# Patient Record
Sex: FEMALE | Race: WHITE | HISPANIC OR LATINO | URBAN - METROPOLITAN AREA
[De-identification: names, ages, dates, MRNs, and addresses within clinical notes are randomized per-mention and may not be internally consistent; named-entity substitution may affect disease eponyms.]

---

## 2017-07-21 ENCOUNTER — EMERGENCY (EMERGENCY)
Facility: HOSPITAL | Age: 72
LOS: 0 days | Discharge: HOME | End: 2017-07-21

## 2017-07-21 DIAGNOSIS — R06.02 SHORTNESS OF BREATH: ICD-10-CM

## 2017-07-21 DIAGNOSIS — J45.909 UNSPECIFIED ASTHMA, UNCOMPLICATED: ICD-10-CM

## 2017-07-21 DIAGNOSIS — I10 ESSENTIAL (PRIMARY) HYPERTENSION: ICD-10-CM

## 2017-07-21 DIAGNOSIS — Z87.442 PERSONAL HISTORY OF URINARY CALCULI: ICD-10-CM

## 2017-07-21 DIAGNOSIS — R42 DIZZINESS AND GIDDINESS: ICD-10-CM

## 2017-07-21 DIAGNOSIS — T54.91XA TOXIC EFFECT OF UNSPECIFIED CORROSIVE SUBSTANCE, ACCIDENTAL (UNINTENTIONAL), INITIAL ENCOUNTER: ICD-10-CM

## 2017-07-29 ENCOUNTER — EMERGENCY (EMERGENCY)
Facility: HOSPITAL | Age: 72
LOS: 0 days | Discharge: HOME | End: 2017-07-29

## 2017-07-29 DIAGNOSIS — I10 ESSENTIAL (PRIMARY) HYPERTENSION: ICD-10-CM
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
## 2017-07-29 DIAGNOSIS — J45.909 UNSPECIFIED ASTHMA, UNCOMPLICATED: ICD-10-CM
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
## 2017-07-29 DIAGNOSIS — R53.1 WEAKNESS: ICD-10-CM

## 2017-07-29 DIAGNOSIS — R35.8 OTHER POLYURIA: ICD-10-CM

## 2017-07-29 DIAGNOSIS — F41.0 PANIC DISORDER [EPISODIC PAROXYSMAL ANXIETY]: ICD-10-CM

## 2017-07-29 DIAGNOSIS — R63.1 POLYDIPSIA: ICD-10-CM

## 2017-07-29 DIAGNOSIS — R00.2 PALPITATIONS: ICD-10-CM

## 2017-07-29 DIAGNOSIS — S52.501A UNSPECIFIED FRACTURE OF THE LOWER END OF RIGHT RADIUS, INITIAL ENCOUNTER FOR CLOSED FRACTURE: ICD-10-CM
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
## 2017-07-29 DIAGNOSIS — R51 HEADACHE: ICD-10-CM

## 2017-07-29 DIAGNOSIS — M79.605 PAIN IN LEFT LEG: ICD-10-CM

## 2017-11-22 ENCOUNTER — OUTPATIENT (OUTPATIENT)
Dept: OUTPATIENT SERVICES | Facility: HOSPITAL | Age: 72
LOS: 1 days | Discharge: HOME | End: 2017-11-22

## 2017-11-22 DIAGNOSIS — F41.1 GENERALIZED ANXIETY DISORDER: ICD-10-CM

## 2017-12-19 ENCOUNTER — INPATIENT (INPATIENT)
Facility: HOSPITAL | Age: 72
LOS: 1 days | Discharge: HOME | End: 2017-12-21

## 2017-12-19 DIAGNOSIS — R07.9 CHEST PAIN, UNSPECIFIED: ICD-10-CM

## 2017-12-27 DIAGNOSIS — I16.0 HYPERTENSIVE URGENCY: ICD-10-CM

## 2017-12-27 DIAGNOSIS — R07.9 CHEST PAIN, UNSPECIFIED: ICD-10-CM

## 2017-12-27 DIAGNOSIS — E66.9 OBESITY, UNSPECIFIED: ICD-10-CM

## 2017-12-27 DIAGNOSIS — F41.9 ANXIETY DISORDER, UNSPECIFIED: ICD-10-CM

## 2017-12-27 DIAGNOSIS — E03.9 HYPOTHYROIDISM, UNSPECIFIED: ICD-10-CM

## 2018-01-23 ENCOUNTER — OUTPATIENT (OUTPATIENT)
Dept: OUTPATIENT SERVICES | Facility: HOSPITAL | Age: 73
LOS: 1 days | Discharge: HOME | End: 2018-01-23

## 2018-03-06 ENCOUNTER — OUTPATIENT (OUTPATIENT)
Dept: OUTPATIENT SERVICES | Facility: HOSPITAL | Age: 73
LOS: 1 days | Discharge: HOME | End: 2018-03-06

## 2018-03-06 DIAGNOSIS — F41.1 GENERALIZED ANXIETY DISORDER: ICD-10-CM

## 2018-04-04 ENCOUNTER — OUTPATIENT (OUTPATIENT)
Dept: OUTPATIENT SERVICES | Facility: HOSPITAL | Age: 73
LOS: 1 days | Discharge: HOME | End: 2018-04-04

## 2018-04-19 ENCOUNTER — APPOINTMENT (OUTPATIENT)
Dept: SURGERY | Facility: CLINIC | Age: 73
End: 2018-04-19
Payer: MEDICAID

## 2018-04-19 VITALS — BODY MASS INDEX: 36.32 KG/M2 | WEIGHT: 225 LBS

## 2018-04-19 PROCEDURE — 99203 OFFICE O/P NEW LOW 30 MIN: CPT

## 2018-05-10 ENCOUNTER — FORM ENCOUNTER (OUTPATIENT)
Age: 73
End: 2018-05-10

## 2018-05-11 ENCOUNTER — FORM ENCOUNTER (OUTPATIENT)
Age: 73
End: 2018-05-11

## 2018-05-11 ENCOUNTER — OUTPATIENT (OUTPATIENT)
Dept: OUTPATIENT SERVICES | Facility: HOSPITAL | Age: 73
LOS: 1 days | Discharge: HOME | End: 2018-05-11

## 2018-05-11 VITALS
TEMPERATURE: 98 F | DIASTOLIC BLOOD PRESSURE: 69 MMHG | WEIGHT: 219.8 LBS | RESPIRATION RATE: 20 BRPM | HEART RATE: 84 BPM | OXYGEN SATURATION: 98 % | SYSTOLIC BLOOD PRESSURE: 151 MMHG | HEIGHT: 66 IN

## 2018-05-11 DIAGNOSIS — Z98.51 TUBAL LIGATION STATUS: Chronic | ICD-10-CM

## 2018-05-11 DIAGNOSIS — Z90.89 ACQUIRED ABSENCE OF OTHER ORGANS: Chronic | ICD-10-CM

## 2018-05-11 DIAGNOSIS — Z01.818 ENCOUNTER FOR OTHER PREPROCEDURAL EXAMINATION: ICD-10-CM

## 2018-05-11 DIAGNOSIS — Z90.49 ACQUIRED ABSENCE OF OTHER SPECIFIED PARTS OF DIGESTIVE TRACT: Chronic | ICD-10-CM

## 2018-05-11 DIAGNOSIS — K43.6 OTHER AND UNSPECIFIED VENTRAL HERNIA WITH OBSTRUCTION, WITHOUT GANGRENE: ICD-10-CM

## 2018-05-11 LAB
ALBUMIN SERPL ELPH-MCNC: 4.3 G/DL — SIGNIFICANT CHANGE UP (ref 3.5–5.2)
ALP SERPL-CCNC: 66 U/L — SIGNIFICANT CHANGE UP (ref 30–115)
ALT FLD-CCNC: 12 U/L — SIGNIFICANT CHANGE UP (ref 0–41)
ANION GAP SERPL CALC-SCNC: 11 MMOL/L — SIGNIFICANT CHANGE UP (ref 7–14)
APPEARANCE UR: CLEAR — SIGNIFICANT CHANGE UP
APTT BLD: 28 SEC — SIGNIFICANT CHANGE UP (ref 27–39.2)
AST SERPL-CCNC: 19 U/L — SIGNIFICANT CHANGE UP (ref 0–41)
BASOPHILS # BLD AUTO: 0.03 K/UL — SIGNIFICANT CHANGE UP (ref 0–0.2)
BASOPHILS NFR BLD AUTO: 0.5 % — SIGNIFICANT CHANGE UP (ref 0–1)
BILIRUB SERPL-MCNC: 0.5 MG/DL — SIGNIFICANT CHANGE UP (ref 0.2–1.2)
BILIRUB UR-MCNC: NEGATIVE — SIGNIFICANT CHANGE UP
BUN SERPL-MCNC: 11 MG/DL — SIGNIFICANT CHANGE UP (ref 10–20)
CALCIUM SERPL-MCNC: 9.5 MG/DL — SIGNIFICANT CHANGE UP (ref 8.5–10.1)
CHLORIDE SERPL-SCNC: 100 MMOL/L — SIGNIFICANT CHANGE UP (ref 98–110)
CO2 SERPL-SCNC: 27 MMOL/L — SIGNIFICANT CHANGE UP (ref 17–32)
COLOR SPEC: YELLOW — SIGNIFICANT CHANGE UP
CREAT SERPL-MCNC: 0.8 MG/DL — SIGNIFICANT CHANGE UP (ref 0.7–1.5)
DIFF PNL FLD: NEGATIVE — SIGNIFICANT CHANGE UP
EOSINOPHIL # BLD AUTO: 0.14 K/UL — SIGNIFICANT CHANGE UP (ref 0–0.7)
EOSINOPHIL NFR BLD AUTO: 2.6 % — SIGNIFICANT CHANGE UP (ref 0–8)
GLUCOSE SERPL-MCNC: 93 MG/DL — SIGNIFICANT CHANGE UP (ref 70–99)
GLUCOSE UR QL: NEGATIVE MG/DL — SIGNIFICANT CHANGE UP
HCT VFR BLD CALC: 40.6 % — SIGNIFICANT CHANGE UP (ref 37–47)
HGB BLD-MCNC: 14 G/DL — SIGNIFICANT CHANGE UP (ref 12–16)
IMM GRANULOCYTES NFR BLD AUTO: 0.5 % — HIGH (ref 0.1–0.3)
INR BLD: 1.05 RATIO — SIGNIFICANT CHANGE UP (ref 0.65–1.3)
KETONES UR-MCNC: NEGATIVE — SIGNIFICANT CHANGE UP
LEUKOCYTE ESTERASE UR-ACNC: NEGATIVE — SIGNIFICANT CHANGE UP
LYMPHOCYTES # BLD AUTO: 1.52 K/UL — SIGNIFICANT CHANGE UP (ref 1.2–3.4)
LYMPHOCYTES # BLD AUTO: 27.8 % — SIGNIFICANT CHANGE UP (ref 20.5–51.1)
MCHC RBC-ENTMCNC: 30.4 PG — SIGNIFICANT CHANGE UP (ref 27–31)
MCHC RBC-ENTMCNC: 34.5 G/DL — SIGNIFICANT CHANGE UP (ref 32–37)
MCV RBC AUTO: 88.1 FL — SIGNIFICANT CHANGE UP (ref 81–99)
MONOCYTES # BLD AUTO: 0.46 K/UL — SIGNIFICANT CHANGE UP (ref 0.1–0.6)
MONOCYTES NFR BLD AUTO: 8.4 % — SIGNIFICANT CHANGE UP (ref 1.7–9.3)
NEUTROPHILS # BLD AUTO: 3.28 K/UL — SIGNIFICANT CHANGE UP (ref 1.4–6.5)
NEUTROPHILS NFR BLD AUTO: 60.2 % — SIGNIFICANT CHANGE UP (ref 42.2–75.2)
NITRITE UR-MCNC: NEGATIVE — SIGNIFICANT CHANGE UP
NRBC # BLD: 0 /100 WBCS — SIGNIFICANT CHANGE UP (ref 0–0)
PH UR: 6.5 — SIGNIFICANT CHANGE UP (ref 5–8)
PLATELET # BLD AUTO: 263 K/UL — SIGNIFICANT CHANGE UP (ref 130–400)
POTASSIUM SERPL-MCNC: 4.7 MMOL/L — SIGNIFICANT CHANGE UP (ref 3.5–5)
POTASSIUM SERPL-SCNC: 4.7 MMOL/L — SIGNIFICANT CHANGE UP (ref 3.5–5)
PROT SERPL-MCNC: 6.9 G/DL — SIGNIFICANT CHANGE UP (ref 6–8)
PROT UR-MCNC: NEGATIVE MG/DL — SIGNIFICANT CHANGE UP
PROTHROM AB SERPL-ACNC: 11.3 SEC — SIGNIFICANT CHANGE UP (ref 9.95–12.87)
RBC # BLD: 4.61 M/UL — SIGNIFICANT CHANGE UP (ref 4.2–5.4)
RBC # FLD: 12.8 % — SIGNIFICANT CHANGE UP (ref 11.5–14.5)
SODIUM SERPL-SCNC: 138 MMOL/L — SIGNIFICANT CHANGE UP (ref 135–146)
SP GR SPEC: 1.01 — SIGNIFICANT CHANGE UP (ref 1.01–1.03)
UROBILINOGEN FLD QL: 0.2 MG/DL — SIGNIFICANT CHANGE UP (ref 0.2–0.2)
WBC # BLD: 5.46 K/UL — SIGNIFICANT CHANGE UP (ref 4.8–10.8)
WBC # FLD AUTO: 5.46 K/UL — SIGNIFICANT CHANGE UP (ref 4.8–10.8)

## 2018-05-11 NOTE — H&P PST ADULT - HISTORY OF PRESENT ILLNESS
72 Y/O FEMALE SCHEDULED FOR REPAIR OF INCARCERATED VENTRAL HERNIA  REPORTS NO C/O CP,SOB,PALPITATIONS,COUGH OR DYSURIA  1-2 FOS WITHOUT SOB

## 2018-05-11 NOTE — H&P PST ADULT - FAMILY HISTORY
Father  Still living? No  Family history of peripheral vascular disease, Age at diagnosis: Age Unknown

## 2018-05-12 ENCOUNTER — OUTPATIENT (OUTPATIENT)
Dept: OUTPATIENT SERVICES | Facility: HOSPITAL | Age: 73
LOS: 1 days | Discharge: HOME | End: 2018-05-12

## 2018-05-12 DIAGNOSIS — I49.9 CARDIAC ARRHYTHMIA, UNSPECIFIED: ICD-10-CM

## 2018-05-12 DIAGNOSIS — Z90.49 ACQUIRED ABSENCE OF OTHER SPECIFIED PARTS OF DIGESTIVE TRACT: Chronic | ICD-10-CM

## 2018-05-12 DIAGNOSIS — M15.0 PRIMARY GENERALIZED (OSTEO)ARTHRITIS: ICD-10-CM

## 2018-05-12 DIAGNOSIS — I10 ESSENTIAL (PRIMARY) HYPERTENSION: ICD-10-CM

## 2018-05-12 DIAGNOSIS — E03.9 HYPOTHYROIDISM, UNSPECIFIED: ICD-10-CM

## 2018-05-12 DIAGNOSIS — Z90.89 ACQUIRED ABSENCE OF OTHER ORGANS: Chronic | ICD-10-CM

## 2018-05-12 DIAGNOSIS — J45.909 UNSPECIFIED ASTHMA, UNCOMPLICATED: ICD-10-CM

## 2018-05-12 DIAGNOSIS — Z98.51 TUBAL LIGATION STATUS: Chronic | ICD-10-CM

## 2018-05-16 ENCOUNTER — OUTPATIENT (OUTPATIENT)
Dept: OUTPATIENT SERVICES | Facility: HOSPITAL | Age: 73
LOS: 1 days | Discharge: HOME | End: 2018-05-16

## 2018-05-16 DIAGNOSIS — Z98.51 TUBAL LIGATION STATUS: Chronic | ICD-10-CM

## 2018-05-16 DIAGNOSIS — Z90.89 ACQUIRED ABSENCE OF OTHER ORGANS: Chronic | ICD-10-CM

## 2018-05-16 DIAGNOSIS — Z90.49 ACQUIRED ABSENCE OF OTHER SPECIFIED PARTS OF DIGESTIVE TRACT: Chronic | ICD-10-CM

## 2018-05-16 DIAGNOSIS — F41.1 GENERALIZED ANXIETY DISORDER: ICD-10-CM

## 2018-05-25 ENCOUNTER — APPOINTMENT (OUTPATIENT)
Dept: SURGERY | Facility: AMBULATORY SURGERY CENTER | Age: 73
End: 2018-05-25
Payer: MEDICAID

## 2018-05-25 ENCOUNTER — OUTPATIENT (OUTPATIENT)
Dept: OUTPATIENT SERVICES | Facility: HOSPITAL | Age: 73
LOS: 1 days | Discharge: HOME | End: 2018-05-25

## 2018-05-25 ENCOUNTER — RESULT REVIEW (OUTPATIENT)
Age: 73
End: 2018-05-25

## 2018-05-25 VITALS
RESPIRATION RATE: 18 BRPM | OXYGEN SATURATION: 99 % | SYSTOLIC BLOOD PRESSURE: 144 MMHG | HEART RATE: 72 BPM | DIASTOLIC BLOOD PRESSURE: 69 MMHG

## 2018-05-25 VITALS
DIASTOLIC BLOOD PRESSURE: 75 MMHG | HEART RATE: 62 BPM | WEIGHT: 219.8 LBS | HEIGHT: 66 IN | RESPIRATION RATE: 18 BRPM | TEMPERATURE: 98 F | OXYGEN SATURATION: 98 % | SYSTOLIC BLOOD PRESSURE: 150 MMHG

## 2018-05-25 DIAGNOSIS — Z98.51 TUBAL LIGATION STATUS: Chronic | ICD-10-CM

## 2018-05-25 DIAGNOSIS — Z90.49 ACQUIRED ABSENCE OF OTHER SPECIFIED PARTS OF DIGESTIVE TRACT: Chronic | ICD-10-CM

## 2018-05-25 DIAGNOSIS — Z90.89 ACQUIRED ABSENCE OF OTHER ORGANS: Chronic | ICD-10-CM

## 2018-05-25 PROCEDURE — 49566: CPT

## 2018-05-25 PROCEDURE — 49568: CPT

## 2018-05-25 RX ORDER — SODIUM CHLORIDE 9 MG/ML
1000 INJECTION, SOLUTION INTRAVENOUS
Qty: 0 | Refills: 0 | Status: DISCONTINUED | OUTPATIENT
Start: 2018-05-25 | End: 2018-06-09

## 2018-05-25 RX ORDER — TRAMADOL HYDROCHLORIDE 50 MG/1
1 TABLET ORAL
Qty: 30 | Refills: 0 | OUTPATIENT
Start: 2018-05-25 | End: 2018-05-29

## 2018-05-25 RX ORDER — ONDANSETRON 8 MG/1
4 TABLET, FILM COATED ORAL ONCE
Qty: 0 | Refills: 0 | Status: DISCONTINUED | OUTPATIENT
Start: 2018-05-25 | End: 2018-06-09

## 2018-05-25 RX ORDER — OXYCODONE AND ACETAMINOPHEN 5; 325 MG/1; MG/1
1 TABLET ORAL EVERY 4 HOURS
Qty: 0 | Refills: 0 | Status: DISCONTINUED | OUTPATIENT
Start: 2018-05-25 | End: 2018-05-25

## 2018-05-25 RX ORDER — HYDROMORPHONE HYDROCHLORIDE 2 MG/ML
0.5 INJECTION INTRAMUSCULAR; INTRAVENOUS; SUBCUTANEOUS
Qty: 0 | Refills: 0 | Status: DISCONTINUED | OUTPATIENT
Start: 2018-05-25 | End: 2018-05-25

## 2018-05-25 RX ADMIN — HYDROMORPHONE HYDROCHLORIDE 0.5 MILLIGRAM(S): 2 INJECTION INTRAMUSCULAR; INTRAVENOUS; SUBCUTANEOUS at 10:37

## 2018-05-25 RX ADMIN — OXYCODONE AND ACETAMINOPHEN 1 TABLET(S): 5; 325 TABLET ORAL at 11:26

## 2018-05-25 RX ADMIN — SODIUM CHLORIDE 100 MILLILITER(S): 9 INJECTION, SOLUTION INTRAVENOUS at 10:17

## 2018-05-25 RX ADMIN — HYDROMORPHONE HYDROCHLORIDE 0.5 MILLIGRAM(S): 2 INJECTION INTRAMUSCULAR; INTRAVENOUS; SUBCUTANEOUS at 11:25

## 2018-05-25 NOTE — ASU DISCHARGE PLAN (ADULT/PEDIATRIC). - SPECIAL INSTRUCTIONS
Diet    Eat light on the day of surgery. Nausea and vomiting can occur after anesthesia,   but usually resolve within 24 hours.  Resume normal diet the following day.      Activity    Rest!  No heavy lifting or strenuous activity.    Medications    Ibuprofen (Advil, Motrin), Naprosyn (Aleve) or Extra-Strength Tylenol for pain.  Tramadol for severe pain only.  Your prescription was sent electronically to your pharmacy.  Remember, Tramadol is a strong narcotic-like pain reliever which can cause drowsiness, upset stomach and constipation.  It should always be taken with food.  You can use stool softener (Mineral Oil) or laxative   (MiraLax or Dulcolax) if constipated.  An antibiotic is given during surgery.  No antibiotic needed at home.   Resume all previous medications.  Resume blood thinners the day after surgery unless told otherwise.    Wound Care    Leave surgical dressing in place.  May shower (dressing is waterproof.)  No pool, ocean, lake, hot-tub or   bath for 3 weeks. If you were given an abdominal binder, please wear it as much as possible (day & night)   for 2 weeks, but you must remove it for showers.  Ice packs to the area intermittently for several days help   with pain and swelling.  Bruising (“black and blue”) is common.  Treatment is…Ice & Rest.

## 2018-05-25 NOTE — BRIEF OPERATIVE NOTE - PROCEDURE
<<-----Click on this checkbox to enter Procedure Repair of incarcerated ventral hernia with mesh  05/25/2018  Recurrent  Active  Taran Capone

## 2018-05-27 ENCOUNTER — INPATIENT (INPATIENT)
Facility: HOSPITAL | Age: 73
LOS: 2 days | Discharge: HOME HEALTH | End: 2018-05-30
Attending: INTERNAL MEDICINE | Admitting: INTERNAL MEDICINE
Payer: MEDICAID

## 2018-05-27 VITALS
DIASTOLIC BLOOD PRESSURE: 78 MMHG | WEIGHT: 210.1 LBS | OXYGEN SATURATION: 95 % | TEMPERATURE: 101 F | HEIGHT: 66 IN | RESPIRATION RATE: 20 BRPM | SYSTOLIC BLOOD PRESSURE: 181 MMHG | HEART RATE: 86 BPM

## 2018-05-27 DIAGNOSIS — Z90.89 ACQUIRED ABSENCE OF OTHER ORGANS: Chronic | ICD-10-CM

## 2018-05-27 DIAGNOSIS — Z90.49 ACQUIRED ABSENCE OF OTHER SPECIFIED PARTS OF DIGESTIVE TRACT: Chronic | ICD-10-CM

## 2018-05-27 DIAGNOSIS — Z98.51 TUBAL LIGATION STATUS: Chronic | ICD-10-CM

## 2018-05-27 DIAGNOSIS — Z98.890 OTHER SPECIFIED POSTPROCEDURAL STATES: Chronic | ICD-10-CM

## 2018-05-27 LAB
ALBUMIN SERPL ELPH-MCNC: 4.2 G/DL — SIGNIFICANT CHANGE UP (ref 3.5–5.2)
ALP SERPL-CCNC: 52 U/L — SIGNIFICANT CHANGE UP (ref 30–115)
ALT FLD-CCNC: 14 U/L — SIGNIFICANT CHANGE UP (ref 0–41)
ANION GAP SERPL CALC-SCNC: 10 MMOL/L — SIGNIFICANT CHANGE UP (ref 7–14)
APTT BLD: 27 SEC — SIGNIFICANT CHANGE UP (ref 27–39.2)
AST SERPL-CCNC: 61 U/L — HIGH (ref 0–41)
BASE EXCESS BLDV CALC-SCNC: 3.2 MMOL/L — HIGH (ref -2–2)
BASOPHILS # BLD AUTO: 0.03 K/UL — SIGNIFICANT CHANGE UP (ref 0–0.2)
BASOPHILS NFR BLD AUTO: 0.3 % — SIGNIFICANT CHANGE UP (ref 0–1)
BILIRUB SERPL-MCNC: 1.1 MG/DL — SIGNIFICANT CHANGE UP (ref 0.2–1.2)
BUN SERPL-MCNC: 10 MG/DL — SIGNIFICANT CHANGE UP (ref 10–20)
CA-I SERPL-SCNC: 1.16 MMOL/L — SIGNIFICANT CHANGE UP (ref 1.12–1.3)
CALCIUM SERPL-MCNC: 8.9 MG/DL — SIGNIFICANT CHANGE UP (ref 8.5–10.1)
CHLORIDE SERPL-SCNC: 90 MMOL/L — LOW (ref 98–110)
CO2 SERPL-SCNC: 26 MMOL/L — SIGNIFICANT CHANGE UP (ref 17–32)
CREAT SERPL-MCNC: 0.7 MG/DL — SIGNIFICANT CHANGE UP (ref 0.7–1.5)
EOSINOPHIL # BLD AUTO: 0.06 K/UL — SIGNIFICANT CHANGE UP (ref 0–0.7)
EOSINOPHIL NFR BLD AUTO: 0.7 % — SIGNIFICANT CHANGE UP (ref 0–8)
GAS PNL BLDV: 130 MMOL/L — LOW (ref 136–145)
GAS PNL BLDV: SIGNIFICANT CHANGE UP
GLUCOSE SERPL-MCNC: 105 MG/DL — HIGH (ref 70–99)
HCO3 BLDV-SCNC: 29 MMOL/L — SIGNIFICANT CHANGE UP (ref 22–29)
HCT VFR BLD CALC: 39.6 % — SIGNIFICANT CHANGE UP (ref 37–47)
HCT VFR BLDA CALC: 46.1 % — HIGH (ref 34–44)
HGB BLD CALC-MCNC: 15 G/DL — SIGNIFICANT CHANGE UP (ref 14–18)
HGB BLD-MCNC: 14.1 G/DL — SIGNIFICANT CHANGE UP (ref 12–16)
HOROWITZ INDEX BLDV+IHG-RTO: 21 — SIGNIFICANT CHANGE UP
IMM GRANULOCYTES NFR BLD AUTO: 0.7 % — HIGH (ref 0.1–0.3)
INR BLD: 1.29 RATIO — SIGNIFICANT CHANGE UP (ref 0.65–1.3)
LACTATE BLDV-MCNC: 0.7 MMOL/L — SIGNIFICANT CHANGE UP (ref 0.5–1.6)
LACTATE SERPL-SCNC: 0.8 MMOL/L — SIGNIFICANT CHANGE UP (ref 0.5–2.2)
LYMPHOCYTES # BLD AUTO: 1.2 K/UL — SIGNIFICANT CHANGE UP (ref 1.2–3.4)
LYMPHOCYTES # BLD AUTO: 13.8 % — LOW (ref 20.5–51.1)
MAGNESIUM SERPL-MCNC: 1.6 MG/DL — LOW (ref 1.8–2.4)
MCHC RBC-ENTMCNC: 30.9 PG — SIGNIFICANT CHANGE UP (ref 27–31)
MCHC RBC-ENTMCNC: 35.6 G/DL — SIGNIFICANT CHANGE UP (ref 32–37)
MCV RBC AUTO: 86.7 FL — SIGNIFICANT CHANGE UP (ref 81–99)
MONOCYTES # BLD AUTO: 0.99 K/UL — HIGH (ref 0.1–0.6)
MONOCYTES NFR BLD AUTO: 11.4 % — HIGH (ref 1.7–9.3)
NEUTROPHILS # BLD AUTO: 6.33 K/UL — SIGNIFICANT CHANGE UP (ref 1.4–6.5)
NEUTROPHILS NFR BLD AUTO: 73.1 % — SIGNIFICANT CHANGE UP (ref 42.2–75.2)
NRBC # BLD: 0 /100 WBCS — SIGNIFICANT CHANGE UP (ref 0–0)
PCO2 BLDV: 45 MMHG — SIGNIFICANT CHANGE UP (ref 41–51)
PH BLDV: 7.41 — SIGNIFICANT CHANGE UP (ref 7.26–7.43)
PLATELET # BLD AUTO: 158 K/UL — SIGNIFICANT CHANGE UP (ref 130–400)
PO2 BLDV: 24 MMHG — SIGNIFICANT CHANGE UP (ref 20–40)
POTASSIUM BLDV-SCNC: 4.1 MMOL/L — SIGNIFICANT CHANGE UP (ref 3.3–5.6)
POTASSIUM SERPL-MCNC: 7 MMOL/L — CRITICAL HIGH (ref 3.5–5)
POTASSIUM SERPL-SCNC: 7 MMOL/L — CRITICAL HIGH (ref 3.5–5)
PROT SERPL-MCNC: 7.4 G/DL — SIGNIFICANT CHANGE UP (ref 6–8)
PROTHROM AB SERPL-ACNC: 14 SEC — HIGH (ref 9.95–12.87)
RBC # BLD: 4.57 M/UL — SIGNIFICANT CHANGE UP (ref 4.2–5.4)
RBC # FLD: 12.6 % — SIGNIFICANT CHANGE UP (ref 11.5–14.5)
SAO2 % BLDV: 38 % — SIGNIFICANT CHANGE UP
SODIUM SERPL-SCNC: 126 MMOL/L — LOW (ref 135–146)
TROPONIN T SERPL-MCNC: <0.01 NG/ML — SIGNIFICANT CHANGE UP
WBC # BLD: 8.67 K/UL — SIGNIFICANT CHANGE UP (ref 4.8–10.8)
WBC # FLD AUTO: 8.67 K/UL — SIGNIFICANT CHANGE UP (ref 4.8–10.8)

## 2018-05-27 RX ORDER — CIPROFLOXACIN LACTATE 400MG/40ML
400 VIAL (ML) INTRAVENOUS ONCE
Qty: 0 | Refills: 0 | Status: COMPLETED | OUTPATIENT
Start: 2018-05-27 | End: 2018-05-27

## 2018-05-27 RX ORDER — SODIUM CHLORIDE 9 MG/ML
2000 INJECTION, SOLUTION INTRAVENOUS ONCE
Qty: 0 | Refills: 0 | Status: COMPLETED | OUTPATIENT
Start: 2018-05-27 | End: 2018-05-27

## 2018-05-27 RX ORDER — METRONIDAZOLE 500 MG
500 TABLET ORAL ONCE
Qty: 0 | Refills: 0 | Status: COMPLETED | OUTPATIENT
Start: 2018-05-27 | End: 2018-05-27

## 2018-05-27 RX ORDER — ACETAMINOPHEN 500 MG
975 TABLET ORAL ONCE
Qty: 0 | Refills: 0 | Status: COMPLETED | OUTPATIENT
Start: 2018-05-27 | End: 2018-05-27

## 2018-05-27 RX ADMIN — Medication 100 MILLIGRAM(S): at 20:19

## 2018-05-27 RX ADMIN — Medication 200 MILLIGRAM(S): at 20:19

## 2018-05-27 RX ADMIN — SODIUM CHLORIDE 2000 MILLILITER(S): 9 INJECTION, SOLUTION INTRAVENOUS at 20:19

## 2018-05-27 RX ADMIN — Medication 975 MILLIGRAM(S): at 20:19

## 2018-05-27 NOTE — ED ADULT TRIAGE NOTE - CHIEF COMPLAINT QUOTE
BIBA. Patient had umbilical hernia surgery yesterday and is having severe pain and right leg swelling.

## 2018-05-28 LAB
ANION GAP SERPL CALC-SCNC: 13 MMOL/L — SIGNIFICANT CHANGE UP (ref 7–14)
APPEARANCE UR: CLEAR — SIGNIFICANT CHANGE UP
BILIRUB UR-MCNC: NEGATIVE — SIGNIFICANT CHANGE UP
BUN SERPL-MCNC: 11 MG/DL — SIGNIFICANT CHANGE UP (ref 10–20)
CALCIUM SERPL-MCNC: 8.4 MG/DL — LOW (ref 8.5–10.1)
CHLORIDE SERPL-SCNC: 96 MMOL/L — LOW (ref 98–110)
CK SERPL-CCNC: 112 U/L — SIGNIFICANT CHANGE UP (ref 0–225)
CO2 SERPL-SCNC: 27 MMOL/L — SIGNIFICANT CHANGE UP (ref 17–32)
COLOR SPEC: YELLOW — SIGNIFICANT CHANGE UP
CREAT SERPL-MCNC: 0.8 MG/DL — SIGNIFICANT CHANGE UP (ref 0.7–1.5)
DIFF PNL FLD: NEGATIVE — SIGNIFICANT CHANGE UP
GLUCOSE SERPL-MCNC: 91 MG/DL — SIGNIFICANT CHANGE UP (ref 70–99)
GLUCOSE UR QL: NEGATIVE MG/DL — SIGNIFICANT CHANGE UP
KETONES UR-MCNC: NEGATIVE — SIGNIFICANT CHANGE UP
LEUKOCYTE ESTERASE UR-ACNC: NEGATIVE — SIGNIFICANT CHANGE UP
NITRITE UR-MCNC: NEGATIVE — SIGNIFICANT CHANGE UP
OSMOLALITY SERPL: 275 MOS/KG — LOW (ref 289–308)
PH UR: 6 — SIGNIFICANT CHANGE UP (ref 5–8)
POTASSIUM SERPL-MCNC: 4.7 MMOL/L — SIGNIFICANT CHANGE UP (ref 3.5–5)
POTASSIUM SERPL-SCNC: 4.7 MMOL/L — SIGNIFICANT CHANGE UP (ref 3.5–5)
PROT UR-MCNC: NEGATIVE MG/DL — SIGNIFICANT CHANGE UP
SODIUM SERPL-SCNC: 136 MMOL/L — SIGNIFICANT CHANGE UP (ref 135–146)
SP GR SPEC: 1.01 — SIGNIFICANT CHANGE UP (ref 1.01–1.03)
UROBILINOGEN FLD QL: 0.2 MG/DL — SIGNIFICANT CHANGE UP (ref 0.2–0.2)

## 2018-05-28 PROCEDURE — 93926 LOWER EXTREMITY STUDY: CPT | Mod: 26

## 2018-05-28 PROCEDURE — 93970 EXTREMITY STUDY: CPT | Mod: 26

## 2018-05-28 RX ORDER — MORPHINE SULFATE 50 MG/1
4 CAPSULE, EXTENDED RELEASE ORAL ONCE
Qty: 0 | Refills: 0 | Status: DISCONTINUED | OUTPATIENT
Start: 2018-05-28 | End: 2018-05-28

## 2018-05-28 RX ORDER — BUDESONIDE AND FORMOTEROL FUMARATE DIHYDRATE 160; 4.5 UG/1; UG/1
2 AEROSOL RESPIRATORY (INHALATION)
Qty: 0 | Refills: 0 | Status: DISCONTINUED | OUTPATIENT
Start: 2018-05-28 | End: 2018-05-30

## 2018-05-28 RX ORDER — DEXAMETHASONE 0.5 MG/5ML
10 ELIXIR ORAL ONCE
Qty: 0 | Refills: 0 | Status: DISCONTINUED | OUTPATIENT
Start: 2018-05-28 | End: 2018-05-28

## 2018-05-28 RX ORDER — LEVOTHYROXINE SODIUM 125 MCG
200 TABLET ORAL DAILY
Qty: 0 | Refills: 0 | Status: DISCONTINUED | OUTPATIENT
Start: 2018-05-28 | End: 2018-05-30

## 2018-05-28 RX ORDER — CLONAZEPAM 1 MG
1.5 TABLET ORAL DAILY
Qty: 0 | Refills: 0 | Status: DISCONTINUED | OUTPATIENT
Start: 2018-05-28 | End: 2018-05-30

## 2018-05-28 RX ORDER — ENOXAPARIN SODIUM 100 MG/ML
40 INJECTION SUBCUTANEOUS EVERY 24 HOURS
Qty: 0 | Refills: 0 | Status: DISCONTINUED | OUTPATIENT
Start: 2018-05-28 | End: 2018-05-30

## 2018-05-28 RX ORDER — DEXAMETHASONE 0.5 MG/5ML
4 ELIXIR ORAL EVERY 6 HOURS
Qty: 0 | Refills: 0 | Status: DISCONTINUED | OUTPATIENT
Start: 2018-05-28 | End: 2018-05-30

## 2018-05-28 RX ORDER — PANTOPRAZOLE SODIUM 20 MG/1
40 TABLET, DELAYED RELEASE ORAL
Qty: 0 | Refills: 0 | Status: DISCONTINUED | OUTPATIENT
Start: 2018-05-28 | End: 2018-05-30

## 2018-05-28 RX ORDER — ALPRAZOLAM 0.25 MG
0.25 TABLET ORAL ONCE
Qty: 0 | Refills: 0 | Status: DISCONTINUED | OUTPATIENT
Start: 2018-05-28 | End: 2018-05-28

## 2018-05-28 RX ORDER — SODIUM CHLORIDE 9 MG/ML
1000 INJECTION INTRAMUSCULAR; INTRAVENOUS; SUBCUTANEOUS
Qty: 0 | Refills: 0 | Status: DISCONTINUED | OUTPATIENT
Start: 2018-05-28 | End: 2018-05-28

## 2018-05-28 RX ORDER — MONTELUKAST 4 MG/1
10 TABLET, CHEWABLE ORAL DAILY
Qty: 0 | Refills: 0 | Status: DISCONTINUED | OUTPATIENT
Start: 2018-05-28 | End: 2018-05-30

## 2018-05-28 RX ORDER — MAGNESIUM SULFATE 500 MG/ML
2 VIAL (ML) INJECTION ONCE
Qty: 0 | Refills: 0 | Status: COMPLETED | OUTPATIENT
Start: 2018-05-28 | End: 2018-05-28

## 2018-05-28 RX ORDER — SODIUM CHLORIDE 9 MG/ML
1000 INJECTION, SOLUTION INTRAVENOUS ONCE
Qty: 0 | Refills: 0 | Status: COMPLETED | OUTPATIENT
Start: 2018-05-28 | End: 2018-05-28

## 2018-05-28 RX ORDER — DEXAMETHASONE 0.5 MG/5ML
10 ELIXIR ORAL ONCE
Qty: 0 | Refills: 0 | Status: COMPLETED | OUTPATIENT
Start: 2018-05-28 | End: 2018-05-28

## 2018-05-28 RX ORDER — DIPHENHYDRAMINE HCL 50 MG
25 CAPSULE ORAL ONCE
Qty: 0 | Refills: 0 | Status: COMPLETED | OUTPATIENT
Start: 2018-05-28 | End: 2018-05-28

## 2018-05-28 RX ORDER — OXYCODONE AND ACETAMINOPHEN 5; 325 MG/1; MG/1
2 TABLET ORAL ONCE
Qty: 0 | Refills: 0 | Status: DISCONTINUED | OUTPATIENT
Start: 2018-05-28 | End: 2018-05-28

## 2018-05-28 RX ORDER — TRAMADOL HYDROCHLORIDE 50 MG/1
50 TABLET ORAL EVERY 4 HOURS
Qty: 0 | Refills: 0 | Status: DISCONTINUED | OUTPATIENT
Start: 2018-05-28 | End: 2018-05-30

## 2018-05-28 RX ORDER — VALSARTAN 80 MG/1
160 TABLET ORAL DAILY
Qty: 0 | Refills: 0 | Status: DISCONTINUED | OUTPATIENT
Start: 2018-05-28 | End: 2018-05-30

## 2018-05-28 RX ADMIN — OXYCODONE AND ACETAMINOPHEN 2 TABLET(S): 5; 325 TABLET ORAL at 08:12

## 2018-05-28 RX ADMIN — MONTELUKAST 10 MILLIGRAM(S): 4 TABLET, CHEWABLE ORAL at 13:40

## 2018-05-28 RX ADMIN — TRAMADOL HYDROCHLORIDE 50 MILLIGRAM(S): 50 TABLET ORAL at 21:32

## 2018-05-28 RX ADMIN — Medication 0.25 MILLIGRAM(S): at 18:43

## 2018-05-28 RX ADMIN — MORPHINE SULFATE 4 MILLIGRAM(S): 50 CAPSULE, EXTENDED RELEASE ORAL at 02:24

## 2018-05-28 RX ADMIN — SODIUM CHLORIDE 1000 MILLILITER(S): 9 INJECTION, SOLUTION INTRAVENOUS at 08:12

## 2018-05-28 RX ADMIN — TRAMADOL HYDROCHLORIDE 50 MILLIGRAM(S): 50 TABLET ORAL at 21:02

## 2018-05-28 RX ADMIN — Medication 25 MILLIGRAM(S): at 10:42

## 2018-05-28 RX ADMIN — MORPHINE SULFATE 4 MILLIGRAM(S): 50 CAPSULE, EXTENDED RELEASE ORAL at 03:00

## 2018-05-28 RX ADMIN — BUDESONIDE AND FORMOTEROL FUMARATE DIHYDRATE 2 PUFF(S): 160; 4.5 AEROSOL RESPIRATORY (INHALATION) at 21:03

## 2018-05-28 RX ADMIN — Medication 1.5 MILLIGRAM(S): at 13:40

## 2018-05-28 RX ADMIN — Medication 50 GRAM(S): at 14:20

## 2018-05-28 RX ADMIN — ENOXAPARIN SODIUM 40 MILLIGRAM(S): 100 INJECTION SUBCUTANEOUS at 13:40

## 2018-05-28 RX ADMIN — Medication 102 MILLIGRAM(S): at 23:20

## 2018-05-28 RX ADMIN — OXYCODONE AND ACETAMINOPHEN 2 TABLET(S): 5; 325 TABLET ORAL at 09:00

## 2018-05-28 NOTE — H&P ADULT - HISTORY OF PRESENT ILLNESS
73 y/o female with PMHx of HTN, arthritis hypothyroidism and hx of DVT p/w worsening right leg pain for 1 day. patient states it gets worse with movement and she wasn't able to ambulate. it is associated with tingling ,numbness and lower back pain. patient had a recent umbilical hernia surgery 2 days ago. Patient went to the [facility] where they were suspecting DVT and transfer the patient her for vascular eval. patient's work up was negative for venous and arterial pathology. patient was found to have a urinary retention and bonilla was put in with 500 CC came out.  Patient denies fever, sweating, CP, cough, n/v/d, urinary and bowel problems. She denies headache, blurry vision and focal weakness.    In the ED patient was given cipro, flagyl and LR

## 2018-05-28 NOTE — CONSULT NOTE ADULT - SUBJECTIVE AND OBJECTIVE BOX
Patient is a 71 y/o female w/ PMHx listed below who presents as a transfer from [facility] with c/o right worse than left foot pain, cold to touch. Patient has had symptoms of foot pain for approximately 1 year, but this pain acutely worsened after umbilical hernia repair 3 days ago. Patient has not been able to ambulate due to the pain. Patient described the pain as a constant, strong pulling pain, like her foot has fallen asleep, from the back of her knee down to her ankle. She has not been able to walk due to the pain as it is worse with movement and better with rest. Pain medication has not helped. Pain is currently 8/10 but 10/10 at its worst.       PAST MEDICAL & SURGICAL HISTORY:  Blood clot in vein  Hypothyroid  HTN (hypertension)  H/O hernia repair: friday, may 25 2018      Vital Signs Last 24 Hrs  T(C): 36.7 (28 May 2018 03:29), Max: 38.3 (27 May 2018 19:16)  T(F): 98.1 (28 May 2018 03:29), Max: 100.9 (27 May 2018 19:16)  HR: 71 (28 May 2018 03:29) (71 - 88)  BP: 145/85 (28 May 2018 03:29) (145/85 - 181/78)  BP(mean): --  RR: 18 (28 May 2018 03:29) (18 - 20)  SpO2: 98% (28 May 2018 03:29) (95% - 99%)      I&O's Detail    27 May 2018 07:01  -  28 May 2018 05:33  --------------------------------------------------------  IN:    IV PiggyBack: 100 mL  Total IN: 100 mL    OUT:    Voided: 3100 mL  Total OUT: 3100 mL    Total NET: -3000 mL          MEDICATIONS  (STANDING):    MEDICATIONS  (PRN):      PHYSICAL EXAM:    GENERAL: mild/moderate distress due to pain    HEENT: NCAT    CHEST/LUNGS: CTAB    HEART: RRR,  No murmurs, rubs, or gallops    ABDOMEN: Soft, abdominal binder in place, umbilical dressing, incisional tenderness    EXTREMITIES:  right foot cold, bounding dorsalis pedis pulse, faint posterior tibial pulse, cold to touch from ankle to toes, motor function intact, pink/grey discoloration distally 1st toe, left foot warm to touch, palpable DPPT, motor function intact.     NEURO: No focal neurological deficits    SKIN: No rashes or lesions                          14.1   8.67  )-----------( 158      ( 27 May 2018 20:09 )             39.6            126<L>  |  90<L>  |  10  ----------------------------<  105<H>  7.0<HH>   |  26  |  0.7    Ca    8.9      27 May 2018 20:09  Mg     1.6         TPro  7.4  /  Alb  4.2  /  TBili  1.1  /  DBili  x   /  AST  61<H>  /  ALT  14  /  AlkPhos  52      LIVER FUNCTIONS - ( 27 May 2018 20:09 )  Alb: 4.2 g/dL / Pro: 7.4 g/dL / ALK PHOS: 52 U/L / ALT: 14 U/L / AST: 61 U/L / GGT: x           PT/INR - ( 27 May 2018 20:09 )   PT: 14.00 sec;   INR: 1.29 ratio         PTT - ( 27 May 2018 20:09 )  PTT:27.0 sec  CARDIAC MARKERS ( 27 May 2018 20:09 )  x     / <0.01 ng/mL / x     / x     / x          Urinalysis Basic - ( 28 May 2018 00:10 )    Color: Yellow / Appearance: Clear / S.010 / pH: x  Gluc: x / Ketone: Negative  / Bili: Negative / Urobili: 0.2 mg/dL   Blood: x / Protein: Negative mg/dL / Nitrite: Negative   Leuk Esterase: Negative / RBC: x / WBC x   Sq Epi: x / Non Sq Epi: x / Bacteria: x          IMAGING: < from: CT Abdomen and Pelvis w/ IV Cont (18 @ 00:37) >  Findings compatible with recent periumbilical hernia surgery.   Superimposed infectious process is difficult to exclude based on imaging.   No evidence of hernia recurrence.    No evidence of bowel obstruction.    2 small masses in the right breast. Correlation with nonemergent   diagnostic mammogram and sonogram is recommended.    < end of copied text >      SPECTRA: [id number]

## 2018-05-28 NOTE — ED PROVIDER NOTE - PROGRESS NOTE DETAILS
despite blanket and sock, patient right foot/lower leg continue to be cold and painful to move and touch. DP pulse noted. will transfer north for vascular evaluation. vascular resident Dr Aleman aware of consult, wants artery duplex in [place]. sono tech prelim: no DVT. + Baker's cyst patient reported trouble urinating in ED. Dia catheter inserted and 500cc clear yellow urine drained. dw  vascular and gen surg, will eval patient is c/o Rt leg/foot pain, patient is a poor historian, not able to provide much details. Patient is transferred from [facility] for vascular surgery evaluation. Patient Rt distal leg to foot level feels colder, DP pulse is palpable, but Rt great toe appears dusky, decreased cap refill. Discussed with vascular surgery on call, they will come to evaluate patient. upon reexam, RLE now warm, 2+DP/PT, pink in appearance- pt still c/o pain PT SIGNED OUT TO ME BY DR. PELAYO, FOLLOW UP VASCULAR STUDY, FOLLOW UP VASCULAR CONSULTATION, REASSESS AND DISPO. PT SEEN AND EVALUATED BY ME. HISTORY WITH . PT C/O R LEG PAIN AND BACK PAIN. HOSKINS CATHETER IN PLACE, 500 CC IN HOSKINS BAG. RLE WITH 5/5 MOTOR STRENGTH AND NORMAL SENSATION. 2+ DP AND PT PULSES. R FOOT WARM AND WITHOUT PALLOR. BACK WITH LUMBAR SPINE TENDERNESS. ALL DIAGNOSTIC STUDIES AND LABS REVEIWED.

## 2018-05-28 NOTE — ED ADULT NURSE REASSESSMENT NOTE - NS ED NURSE REASSESS COMMENT FT1
[place], right leg pain, + pulses, extremity cold. During second triage, right leg warn, + dorsal pedalis pulse. [place] for vascular consult, right leg pain, + pulses, extremity cold as per BRENDA Boyd. During second triage, right leg warm, + dorsal pedalis pulse.

## 2018-05-28 NOTE — H&P ADULT - ATTENDING COMMENTS
Patient seen and examined independently. Agree with resident note with exceptions.   1. Severe spinal stenosis with lumbar radiculopathy.  -C/w pain meds  -C/w decadron  as per neurology  -Neurosurgery eval > no current intervention, conservative management.  -f/u neurology eval  -PT and rehab.    2. Hyponatremia - sec to dehydration resolving  - Monitor Na.    3. urinary retention  -sec to decrease ambulation.  - TOV today.    4. HTN  - C/w valsartan.    5. Hypothyroidism  - C/w levothyroxine.    6. H/o DVT  - no evidence of DVT on doppler  - lovenox subcutaneous.    7. Right breast masses.  - out pt breast ultrasound/mammogram.

## 2018-05-28 NOTE — PROGRESS NOTE ADULT - SUBJECTIVE AND OBJECTIVE BOX
Neurology Consult    Patient is a 72y old  Female who presents with a chief complaint of worsening right leg pain for 1 day (28 May 2018 11:16)      HPI:  71 y/o female with PMHx of HTN, arthritis hypothyroidism and hx of DVT p/w worsening right leg pain for 1 day. patient states it gets worse with movement and she wasn't able to ambulate. it is associated with tingling ,numbness and lower back pain. patient had a recent umbilical hernia surgery 2 days ago. Patient went to the [facility] where they were suspecting DVT and transfer the patient her for vascular eval. patient's work up was negative for venous and arterial pathology. patient was found to have a urinary retention and bonilla was put in with 500 CC came out.  Patient denies fever, sweating, CP, cough, n/v/d, urinary and bowel problems. She denies headache, blurry vision and focal weakness.    In the ED patient was given cipro, flagyl and LR (28 May 2018 11:16)      PAST MEDICAL & SURGICAL HISTORY:  Osteoarthritis  Blood clot in vein  Hypothyroid  HTN (hypertension)  H/O hernia repair: friday, may 25 2018      FAMILY HISTORY:      Social History: (-) x 3    Allergies    penicillin (Hives)    Intolerances        MEDICATIONS  (STANDING):  buDESOnide 160 MICROgram(s)/formoterol 4.5 MICROgram(s) Inhaler 2 Puff(s) Inhalation two times a day  clonazePAM Tablet 1.5 milliGRAM(s) Oral daily  enoxaparin Injectable 40 milliGRAM(s) SubCutaneous every 24 hours  levothyroxine 200 MICROGram(s) Oral daily  montelukast 10 milliGRAM(s) Oral daily  pantoprazole    Tablet 40 milliGRAM(s) Oral before breakfast  valsartan 160 milliGRAM(s) Oral daily    MEDICATIONS  (PRN):  traMADol 50 milliGRAM(s) Oral every 4 hours PRN Severe Pain (7 - 10)      Review of systems:    pt c/o right lower extremity pain behind and medial to right knee.  Pt c/o left lower abdominal pain since umbilical hernia repair    Vital Signs Last 24 Hrs  T(C): 37.1 (28 May 2018 15:35), Max: 37.5 (28 May 2018 08:08)  T(F): 98.8 (28 May 2018 15:35), Max: 99.5 (28 May 2018 08:08)  HR: 87 (28 May 2018 15:35) (71 - 88)  BP: 115/57 (28 May 2018 15:35) (115/57 - 176/79)  BP(mean): --  RR: 18 (28 May 2018 15:35) (18 - 19)  SpO2: 99% (28 May 2018 11:09) (96% - 99%)    Neurologic Examination:  * Patient examined on MRI katina *    General:  Appearance is consistent with chronologic age.    General: Mental status is difficult to assess secondary to patient speaking primarily Mohawk.  She is able to follow manual muscle commands with her daughter translating.    Cranial nerves: intact VFF.  EOMI w/o nystagmus,  PERRL.  No ptosis/weakness of eyelid closure.  Facial sensation is normal.  No facial asymmetry.  Hearing grossly intact b/l.  Palate elevates midline.  Tongue midline.  Motor examination:   Normal tone, bulk and range of motion.  No tenderness, twitching, tremors or involuntary movements.  Formal Muscle Strength Testing: (MRC grade R/L) 5/5 UE; 5/5 LE.  No observable drift.  Reflexes:   1+ b/l , biceps, triceps, brachioradialis, patella and trace Achilles.  Plantar response downgoing b/l.  Clonus absent.  Sensory examination:   Intact to light touch and pain ,temperature. Proprioception and vibration were decreased in LE's bilaterally.    No spinal sensory level to LT, cold, or vibration.  SLR on right was negative and just caused pain behind right knee.      Labs:   CBC Full  -  ( 27 May 2018 20:09 )  WBC Count : 8.67 K/uL  Hemoglobin : 14.1 g/dL  Hematocrit : 39.6 %  Platelet Count - Automated : 158 K/uL  Mean Cell Volume : 86.7 fL  Mean Cell Hemoglobin : 30.9 pg  Mean Cell Hemoglobin Concentration : 35.6 g/dL  Auto Neutrophil # : 6.33 K/uL  Auto Lymphocyte # : 1.20 K/uL  Auto Monocyte # : 0.99 K/uL  Auto Eosinophil # : 0.06 K/uL  Auto Basophil # : 0.03 K/uL  Auto Neutrophil % : 73.1 %  Auto Lymphocyte % : 13.8 %  Auto Monocyte % : 11.4 %  Auto Eosinophil % : 0.7 %  Auto Basophil % : 0.3 %        136  |  96<L>  |  11  ----------------------------<  91  4.7   |  27  |  0.8    Ca    8.4<L>      28 May 2018 18:50  Mg     1.6         TPro  7.4  /  Alb  4.2  /  TBili  1.1  /  DBili  x   /  AST  61<H>  /  ALT  14  /  AlkPhos  52      LIVER FUNCTIONS - ( 27 May 2018 20:09 )  Alb: 4.2 g/dL / Pro: 7.4 g/dL / ALK PHOS: 52 U/L / ALT: 14 U/L / AST: 61 U/L / GGT: x           PT/INR - ( 27 May 2018 20:09 )   PT: 14.00 sec;   INR: 1.29 ratio         PTT - ( 27 May 2018 20:09 )  PTT:27.0 sec  Urinalysis Basic - ( 28 May 2018 00:10 )    Color: Yellow / Appearance: Clear / S.010 / pH: x  Gluc: x / Ketone: Negative  / Bili: Negative / Urobili: 0.2 mg/dL   Blood: x / Protein: Negative mg/dL / Nitrite: Negative   Leuk Esterase: Negative / RBC: x / WBC x   Sq Epi: x / Non Sq Epi: x / Bacteria: x    ?< from: CT Abdomen and Pelvis w/ IV Cont (18 @ 00:37) >    Findings compatible with recent periumbilical hernia surgery.   Superimposed infectious process is difficult to exclude based on imaging.   No evidence of hernia recurrence.    No evidence of bowel obstruction.    2 small masses in the right breast. Correlation with nonemergent   diagnostic mammogram and sonogram is recommended.    < end of copied text >    MRI lumbar spine w/wo contrast completed, results pending.    Assessment:  This is a 72y Female with h/o recent umbilical hernia repair c/o right lower extremity pain at level of knee and pain in left lower abdomen.  She had essentially unremarkable abdomen and pelvis CT.  She does not have elevated WBC or fever.  She has no evidence for upper motor neuron signs or spinal cord involvement.  Some RLE pain could be radicular in nature.  As far as urinary retention L-spine MRI will assess nerve roots and would also look at patients medications to see if they could be   impacting this.    Plan:   1.  L-spine MRI w/wo completed, review results when available.   2.  Pain medication.  3.  Blood cultures if developes fever.    18 @ 20:33 Neurology Consult    Patient is a 72y old  Female who presents with a chief complaint of worsening right leg pain for 1 day (28 May 2018 11:16)      HPI:  71 y/o female with PMHx of HTN, arthritis hypothyroidism and hx of DVT p/w worsening right leg pain for 1 day. patient states it gets worse with movement and she wasn't able to ambulate. it is associated with tingling ,numbness and lower back pain. patient had a recent umbilical hernia surgery 2 days ago. Patient went to the [facility] where they were suspecting DVT and transfer the patient her for vascular eval. patient's work up was negative for venous and arterial pathology. patient was found to have a urinary retention and bonilla was put in with 500 CC came out.  Patient denies fever, sweating, CP, cough, n/v/d, urinary and bowel problems. She denies headache, blurry vision and focal weakness.    In the ED patient was given cipro, flagyl and LR (28 May 2018 11:16)      PAST MEDICAL & SURGICAL HISTORY:  Osteoarthritis  Blood clot in vein  Hypothyroid  HTN (hypertension)  H/O hernia repair: friday, may 25 2018      FAMILY HISTORY:      Social History: (-) x 3    Allergies    penicillin (Hives)    Intolerances        MEDICATIONS  (STANDING):  buDESOnide 160 MICROgram(s)/formoterol 4.5 MICROgram(s) Inhaler 2 Puff(s) Inhalation two times a day  clonazePAM Tablet 1.5 milliGRAM(s) Oral daily  enoxaparin Injectable 40 milliGRAM(s) SubCutaneous every 24 hours  levothyroxine 200 MICROGram(s) Oral daily  montelukast 10 milliGRAM(s) Oral daily  pantoprazole    Tablet 40 milliGRAM(s) Oral before breakfast  valsartan 160 milliGRAM(s) Oral daily    MEDICATIONS  (PRN):  traMADol 50 milliGRAM(s) Oral every 4 hours PRN Severe Pain (7 - 10)      Review of systems:    pt c/o right lower extremity pain behind and medial to right knee.  Pt c/o left lower abdominal pain since umbilical hernia repair    Vital Signs Last 24 Hrs  T(C): 37.1 (28 May 2018 15:35), Max: 37.5 (28 May 2018 08:08)  T(F): 98.8 (28 May 2018 15:35), Max: 99.5 (28 May 2018 08:08)  HR: 87 (28 May 2018 15:35) (71 - 88)  BP: 115/57 (28 May 2018 15:35) (115/57 - 176/79)  BP(mean): --  RR: 18 (28 May 2018 15:35) (18 - 19)  SpO2: 99% (28 May 2018 11:09) (96% - 99%)    Neurologic Examination:  * Patient examined on MRI katina *    General:  Appearance is consistent with chronologic age.    General: Mental status is difficult to assess secondary to patient speaking primarily German.  She is able to follow manual muscle commands with her daughter translating.    Cranial nerves: intact VFF.  EOMI w/o nystagmus,  PERRL.  No ptosis/weakness of eyelid closure.  Facial sensation is normal.  No facial asymmetry.  Hearing grossly intact b/l.  Palate elevates midline.  Tongue midline.  Motor examination:   Normal tone, bulk and range of motion.  No tenderness, twitching, tremors or involuntary movements.  Formal Muscle Strength Testing: (MRC grade R/L) 5/5 UE; 5/5 LE.  No observable drift.  Reflexes:   1+ b/l , biceps, triceps, brachioradialis, patella and trace Achilles.  Plantar response downgoing b/l.  Clonus absent.  Sensory examination:   Intact to light touch and pain ,temperature. Proprioception and vibration were decreased in LE's bilaterally.    No spinal sensory level to LT, cold, or vibration.  SLR on right was negative and just caused pain behind right knee.      Labs:   CBC Full  -  ( 27 May 2018 20:09 )  WBC Count : 8.67 K/uL  Hemoglobin : 14.1 g/dL  Hematocrit : 39.6 %  Platelet Count - Automated : 158 K/uL  Mean Cell Volume : 86.7 fL  Mean Cell Hemoglobin : 30.9 pg  Mean Cell Hemoglobin Concentration : 35.6 g/dL  Auto Neutrophil # : 6.33 K/uL  Auto Lymphocyte # : 1.20 K/uL  Auto Monocyte # : 0.99 K/uL  Auto Eosinophil # : 0.06 K/uL  Auto Basophil # : 0.03 K/uL  Auto Neutrophil % : 73.1 %  Auto Lymphocyte % : 13.8 %  Auto Monocyte % : 11.4 %  Auto Eosinophil % : 0.7 %  Auto Basophil % : 0.3 %        136  |  96<L>  |  11  ----------------------------<  91  4.7   |  27  |  0.8    Ca    8.4<L>      28 May 2018 18:50  Mg     1.6         TPro  7.4  /  Alb  4.2  /  TBili  1.1  /  DBili  x   /  AST  61<H>  /  ALT  14  /  AlkPhos  52      LIVER FUNCTIONS - ( 27 May 2018 20:09 )  Alb: 4.2 g/dL / Pro: 7.4 g/dL / ALK PHOS: 52 U/L / ALT: 14 U/L / AST: 61 U/L / GGT: x           PT/INR - ( 27 May 2018 20:09 )   PT: 14.00 sec;   INR: 1.29 ratio         PTT - ( 27 May 2018 20:09 )  PTT:27.0 sec  Urinalysis Basic - ( 28 May 2018 00:10 )    Color: Yellow / Appearance: Clear / S.010 / pH: x  Gluc: x / Ketone: Negative  / Bili: Negative / Urobili: 0.2 mg/dL   Blood: x / Protein: Negative mg/dL / Nitrite: Negative   Leuk Esterase: Negative / RBC: x / WBC x   Sq Epi: x / Non Sq Epi: x / Bacteria: x    ?< from: CT Abdomen and Pelvis w/ IV Cont (18 @ 00:37) >    Findings compatible with recent periumbilical hernia surgery.   Superimposed infectious process is difficult to exclude based on imaging.   No evidence of hernia recurrence.    No evidence of bowel obstruction.    2 small masses in the right breast. Correlation with nonemergent   diagnostic mammogram and sonogram is recommended.    < end of copied text >    MRI lumbar spine w/wo contrast completed, results pending.    Assessment:  This is a 72y Female with h/o recent umbilical hernia repair c/o right lower extremity pain at level of knee and pain in left lower abdomen.  She had essentially unremarkable abdomen and pelvis CT.  She does not have elevated WBC or fever.  She has no evidence for upper motor neuron signs or spinal cord involvement.  Some RLE pain could be radicular in nature.  As far as urinary retention L-spine MRI will assess nerve roots and would also look at patients medications to see if they could be   impacting this.    Plan:   1.  L-spine MRI w/wo completed, review results when available, if significant findings e.g. nerve root impingement or abscess then consult neurosurgery.  2.  Pain medication.  3.  Blood cultures if developes fever.    18 @ 20:33 Neurology Consult    Patient is a 72y old  Female who presents with a chief complaint of worsening right leg pain for 1 day (28 May 2018 11:16)      HPI:  73 y/o female with PMHx of HTN, arthritis hypothyroidism and hx of DVT p/w worsening right leg pain for 1 day. patient states it gets worse with movement and she wasn't able to ambulate. it is associated with tingling ,numbness and lower back pain. patient had a recent umbilical hernia surgery 2 days ago. Patient went to the [facility] where they were suspecting DVT and transfer the patient her for vascular eval. patient's work up was negative for venous and arterial pathology. patient was found to have a urinary retention and bonilla was put in with 500 CC came out.  Patient denies fever, sweating, CP, cough, n/v/d, urinary and bowel problems. She denies headache, blurry vision and focal weakness.    In the ED patient was given cipro, flagyl and LR (28 May 2018 11:16)      PAST MEDICAL & SURGICAL HISTORY:  Osteoarthritis  Blood clot in vein  Hypothyroid  HTN (hypertension)  H/O hernia repair: friday, may 25 2018      FAMILY HISTORY:      Social History: (-) x 3    Allergies    penicillin (Hives)    Intolerances        MEDICATIONS  (STANDING):  buDESOnide 160 MICROgram(s)/formoterol 4.5 MICROgram(s) Inhaler 2 Puff(s) Inhalation two times a day  clonazePAM Tablet 1.5 milliGRAM(s) Oral daily  enoxaparin Injectable 40 milliGRAM(s) SubCutaneous every 24 hours  levothyroxine 200 MICROGram(s) Oral daily  montelukast 10 milliGRAM(s) Oral daily  pantoprazole    Tablet 40 milliGRAM(s) Oral before breakfast  valsartan 160 milliGRAM(s) Oral daily    MEDICATIONS  (PRN):  traMADol 50 milliGRAM(s) Oral every 4 hours PRN Severe Pain (7 - 10)      Review of systems:    pt c/o right lower extremity pain behind and medial to right knee.  Pt c/o left lower abdominal pain since umbilical hernia repair    Vital Signs Last 24 Hrs  T(C): 37.1 (28 May 2018 15:35), Max: 37.5 (28 May 2018 08:08)  T(F): 98.8 (28 May 2018 15:35), Max: 99.5 (28 May 2018 08:08)  HR: 87 (28 May 2018 15:35) (71 - 88)  BP: 115/57 (28 May 2018 15:35) (115/57 - 176/79)  BP(mean): --  RR: 18 (28 May 2018 15:35) (18 - 19)  SpO2: 99% (28 May 2018 11:09) (96% - 99%)    Neurologic Examination:  * Patient examined on MRI katina *    General:  Appearance is consistent with chronologic age.    General: Mental status is difficult to assess secondary to patient speaking primarily Divehi.  She is able to follow manual muscle commands with her daughter translating.    Cranial nerves: intact VFF.  EOMI w/o nystagmus,  PERRL.  No ptosis/weakness of eyelid closure.  Facial sensation is normal.  No facial asymmetry.  Hearing grossly intact b/l.  Palate elevates midline.  Tongue midline.  Motor examination:   Normal tone, bulk and range of motion.  No tenderness, twitching, tremors or involuntary movements.  Formal Muscle Strength Testing: (MRC grade R/L) 5/5 UE; 5/5 LE.  No observable drift.  Reflexes:   1+ b/l , biceps, triceps, brachioradialis, patella and trace Achilles.  Plantar response downgoing b/l.  Clonus absent.  Sensory examination:   Intact to light touch and pain ,temperature. Proprioception and vibration were decreased in LE's bilaterally.    No spinal sensory level to LT, cold, or vibration.  SLR on right was negative and just caused pain behind right knee.      Labs:   CBC Full  -  ( 27 May 2018 20:09 )  WBC Count : 8.67 K/uL  Hemoglobin : 14.1 g/dL  Hematocrit : 39.6 %  Platelet Count - Automated : 158 K/uL  Mean Cell Volume : 86.7 fL  Mean Cell Hemoglobin : 30.9 pg  Mean Cell Hemoglobin Concentration : 35.6 g/dL  Auto Neutrophil # : 6.33 K/uL  Auto Lymphocyte # : 1.20 K/uL  Auto Monocyte # : 0.99 K/uL  Auto Eosinophil # : 0.06 K/uL  Auto Basophil # : 0.03 K/uL  Auto Neutrophil % : 73.1 %  Auto Lymphocyte % : 13.8 %  Auto Monocyte % : 11.4 %  Auto Eosinophil % : 0.7 %  Auto Basophil % : 0.3 %        136  |  96<L>  |  11  ----------------------------<  91  4.7   |  27  |  0.8    Ca    8.4<L>      28 May 2018 18:50  Mg     1.6         TPro  7.4  /  Alb  4.2  /  TBili  1.1  /  DBili  x   /  AST  61<H>  /  ALT  14  /  AlkPhos  52      LIVER FUNCTIONS - ( 27 May 2018 20:09 )  Alb: 4.2 g/dL / Pro: 7.4 g/dL / ALK PHOS: 52 U/L / ALT: 14 U/L / AST: 61 U/L / GGT: x           PT/INR - ( 27 May 2018 20:09 )   PT: 14.00 sec;   INR: 1.29 ratio         PTT - ( 27 May 2018 20:09 )  PTT:27.0 sec  Urinalysis Basic - ( 28 May 2018 00:10 )    Color: Yellow / Appearance: Clear / S.010 / pH: x  Gluc: x / Ketone: Negative  / Bili: Negative / Urobili: 0.2 mg/dL   Blood: x / Protein: Negative mg/dL / Nitrite: Negative   Leuk Esterase: Negative / RBC: x / WBC x   Sq Epi: x / Non Sq Epi: x / Bacteria: x    ?< from: CT Abdomen and Pelvis w/ IV Cont (18 @ 00:37) >    Findings compatible with recent periumbilical hernia surgery.   Superimposed infectious process is difficult to exclude based on imaging.   No evidence of hernia recurrence.    No evidence of bowel obstruction.    2 small masses in the right breast. Correlation with nonemergent   diagnostic mammogram and sonogram is recommended.    < end of copied text >    MRI lumbar spine w/wo contrast completed, results pending.    Assessment:  This is a 72y Female with h/o recent umbilical hernia repair c/o right lower extremity pain at level of knee and pain in left lower abdomen.  She had essentially unremarkable abdomen and pelvis CT.  She does not have elevated WBC or fever.  She has no evidence for upper motor neuron signs or spinal cord involvement.  Some RLE pain could be radicular in nature.  As far as urinary retention L-spine MRI will assess nerve roots and would also look at patients medications to see if they could be   impacting this.    Plan:   1.  L-spine MRI w/wo completed, review results when available, if significant findings e.g. nerve root impingement or abscess then consult neurosurgery.  2.  Review medications she is taking for those which can cause urinary retention especially if L-spine MRI is unrevealing.  3.  Blood cultures if developes fever.    18 @ 20:33 Neurology Consult    Patient is a 72y old  Female who presents with a chief complaint of worsening right leg pain for 1 day (28 May 2018 11:16)      HPI:  73 y/o female with PMHx of HTN, arthritis hypothyroidism and hx of DVT p/w worsening right leg pain for 1 day. patient states it gets worse with movement and she wasn't able to ambulate. it is associated with tingling ,numbness and lower back pain. patient had a recent umbilical hernia surgery 2 days ago. Patient went to the [facility] where they were suspecting DVT and transfer the patient her for vascular eval. patient's work up was negative for venous and arterial pathology. patient was found to have a urinary retention and bonilla was put in with 500 CC came out.  Patient denies fever, sweating, CP, cough, n/v/d, urinary and bowel problems. She denies headache, blurry vision and focal weakness.    In the ED patient was given cipro, flagyl and LR (28 May 2018 11:16)      PAST MEDICAL & SURGICAL HISTORY:  Osteoarthritis  Blood clot in vein  Hypothyroid  HTN (hypertension)  H/O hernia repair: friday, may 25 2018      FAMILY HISTORY:      Social History: (-) x 3    Allergies    penicillin (Hives)    Intolerances        MEDICATIONS  (STANDING):  buDESOnide 160 MICROgram(s)/formoterol 4.5 MICROgram(s) Inhaler 2 Puff(s) Inhalation two times a day  clonazePAM Tablet 1.5 milliGRAM(s) Oral daily  enoxaparin Injectable 40 milliGRAM(s) SubCutaneous every 24 hours  levothyroxine 200 MICROGram(s) Oral daily  montelukast 10 milliGRAM(s) Oral daily  pantoprazole    Tablet 40 milliGRAM(s) Oral before breakfast  valsartan 160 milliGRAM(s) Oral daily    MEDICATIONS  (PRN):  traMADol 50 milliGRAM(s) Oral every 4 hours PRN Severe Pain (7 - 10)      Review of systems:    pt c/o right lower extremity pain behind and medial to right knee.  Pt c/o left lower abdominal pain since umbilical hernia repair    Vital Signs Last 24 Hrs  T(C): 37.1 (28 May 2018 15:35), Max: 37.5 (28 May 2018 08:08)  T(F): 98.8 (28 May 2018 15:35), Max: 99.5 (28 May 2018 08:08)  HR: 87 (28 May 2018 15:35) (71 - 88)  BP: 115/57 (28 May 2018 15:35) (115/57 - 176/79)  BP(mean): --  RR: 18 (28 May 2018 15:35) (18 - 19)  SpO2: 99% (28 May 2018 11:09) (96% - 99%)    Neurologic Examination:  * Patient examined on MRI katina *    General:  Appearance is consistent with chronologic age.    General: Mental status is difficult to assess secondary to patient speaking primarily Serbian.  She is able to follow manual muscle commands with her daughter translating.    Cranial nerves: intact VFF.  EOMI w/o nystagmus,  PERRL.  No ptosis/weakness of eyelid closure.  Facial sensation is normal.  No facial asymmetry.  Hearing grossly intact b/l.  Palate elevates midline.  Tongue midline.  Motor examination:   Normal tone, bulk and range of motion.  No tenderness, twitching, tremors or involuntary movements.  Formal Muscle Strength Testing: (MRC grade R/L) 5/5 UE; 5/5 LE.  No observable drift.  Reflexes:   1+ b/l , biceps, triceps, brachioradialis, patella and trace Achilles.  Plantar response downgoing b/l.  Clonus absent.  Sensory examination:   Intact to light touch and pain ,temperature. Proprioception and vibration were decreased in LE's bilaterally.    No spinal sensory level to LT, cold, or vibration.  SLR on right was negative and just caused pain behind right knee.      Labs:   CBC Full  -  ( 27 May 2018 20:09 )  WBC Count : 8.67 K/uL  Hemoglobin : 14.1 g/dL  Hematocrit : 39.6 %  Platelet Count - Automated : 158 K/uL  Mean Cell Volume : 86.7 fL  Mean Cell Hemoglobin : 30.9 pg  Mean Cell Hemoglobin Concentration : 35.6 g/dL  Auto Neutrophil # : 6.33 K/uL  Auto Lymphocyte # : 1.20 K/uL  Auto Monocyte # : 0.99 K/uL  Auto Eosinophil # : 0.06 K/uL  Auto Basophil # : 0.03 K/uL  Auto Neutrophil % : 73.1 %  Auto Lymphocyte % : 13.8 %  Auto Monocyte % : 11.4 %  Auto Eosinophil % : 0.7 %  Auto Basophil % : 0.3 %        136  |  96<L>  |  11  ----------------------------<  91  4.7   |  27  |  0.8    Ca    8.4<L>      28 May 2018 18:50  Mg     1.6         TPro  7.4  /  Alb  4.2  /  TBili  1.1  /  DBili  x   /  AST  61<H>  /  ALT  14  /  AlkPhos  52      LIVER FUNCTIONS - ( 27 May 2018 20:09 )  Alb: 4.2 g/dL / Pro: 7.4 g/dL / ALK PHOS: 52 U/L / ALT: 14 U/L / AST: 61 U/L / GGT: x           PT/INR - ( 27 May 2018 20:09 )   PT: 14.00 sec;   INR: 1.29 ratio         PTT - ( 27 May 2018 20:09 )  PTT:27.0 sec  Urinalysis Basic - ( 28 May 2018 00:10 )    Color: Yellow / Appearance: Clear / S.010 / pH: x  Gluc: x / Ketone: Negative  / Bili: Negative / Urobili: 0.2 mg/dL   Blood: x / Protein: Negative mg/dL / Nitrite: Negative   Leuk Esterase: Negative / RBC: x / WBC x   Sq Epi: x / Non Sq Epi: x / Bacteria: x    ?< from: CT Abdomen and Pelvis w/ IV Cont (18 @ 00:37) >    Findings compatible with recent periumbilical hernia surgery.   Superimposed infectious process is difficult to exclude based on imaging.   No evidence of hernia recurrence.    No evidence of bowel obstruction.    2 small masses in the right breast. Correlation with nonemergent   diagnostic mammogram and sonogram is recommended.    < end of copied text >    MRI lumbar spine w/wo contrast completed, prelim reading - severe spinal stenosis at L3-4.  Disc HNP at this level with disc material causing NF stenosis on right.      Assessment:  This is a 72y Female with h/o recent umbilical hernia repair c/o right lower extremity pain at level of knee and pain in left lower abdomen.  She had essentially unremarkable abdomen and pelvis CT.  She does not have elevated WBC or fever.  She has no evidence for upper motor neuron signs or spinal cord involvement.  Some RLE pain could be radicular in nature.  As far as urinary retention L-spine MRI will assess nerve roots and would also look at patients medications to see if they could be   impacting this.      Plan:   1.  I spoke to neurosurgery regarding patient and MRI results.  Dr. Gotti to see patient tomorrow morning.  2.  Patient to be made NPO after midnight.  3.  Dexamethasone 10 mg IV now then 4 mg IV q6h.      18 @ 20:33 Neurology Consult    Patient is a 72y old  Female who presents with a chief complaint of worsening right leg pain for 1 day (28 May 2018 11:16)      HPI:  71 y/o female with PMHx of HTN, arthritis hypothyroidism and hx of DVT p/w worsening right leg pain for 1 day. patient states it gets worse with movement and she wasn't able to ambulate. it is associated with tingling ,numbness and lower back pain. patient had a recent umbilical hernia surgery 2 days ago. Patient went to the [facility] where they were suspecting DVT and transfer the patient her for vascular eval. patient's work up was negative for venous and arterial pathology. patient was found to have a urinary retention and bonilla was put in with 500 CC came out.  Patient denies fever, sweating, CP, cough, n/v/d, urinary and bowel problems. She denies headache, blurry vision and focal weakness.    In the ED patient was given cipro, flagyl and LR (28 May 2018 11:16)      PAST MEDICAL & SURGICAL HISTORY:  Osteoarthritis  Blood clot in vein  Hypothyroid  HTN (hypertension)  H/O hernia repair: friday, may 25 2018      FAMILY HISTORY:      Social History: (-) x 3    Allergies    penicillin (Hives)    Intolerances        MEDICATIONS  (STANDING):  buDESOnide 160 MICROgram(s)/formoterol 4.5 MICROgram(s) Inhaler 2 Puff(s) Inhalation two times a day  clonazePAM Tablet 1.5 milliGRAM(s) Oral daily  enoxaparin Injectable 40 milliGRAM(s) SubCutaneous every 24 hours  levothyroxine 200 MICROGram(s) Oral daily  montelukast 10 milliGRAM(s) Oral daily  pantoprazole    Tablet 40 milliGRAM(s) Oral before breakfast  valsartan 160 milliGRAM(s) Oral daily    MEDICATIONS  (PRN):  traMADol 50 milliGRAM(s) Oral every 4 hours PRN Severe Pain (7 - 10)      Review of systems:    pt c/o right lower extremity pain behind and medial to right knee.  Pt c/o left lower abdominal pain since umbilical hernia repair    Vital Signs Last 24 Hrs  T(C): 37.1 (28 May 2018 15:35), Max: 37.5 (28 May 2018 08:08)  T(F): 98.8 (28 May 2018 15:35), Max: 99.5 (28 May 2018 08:08)  HR: 87 (28 May 2018 15:35) (71 - 88)  BP: 115/57 (28 May 2018 15:35) (115/57 - 176/79)  BP(mean): --  RR: 18 (28 May 2018 15:35) (18 - 19)  SpO2: 99% (28 May 2018 11:09) (96% - 99%)    Neurologic Examination:  * Patient examined on MRI katina *    General:  Appearance is consistent with chronologic age.    General: Mental status is difficult to assess secondary to patient speaking primarily Yi.  She is able to follow manual muscle commands with her daughter translating.    Cranial nerves: intact VFF.  EOMI w/o nystagmus,  PERRL.  No ptosis/weakness of eyelid closure.  Facial sensation is normal.  No facial asymmetry.  Hearing grossly intact b/l.  Palate elevates midline.  Tongue midline.  Motor examination:   Normal tone, bulk and range of motion.  No tenderness, twitching, tremors or involuntary movements.  Formal Muscle Strength Testing: (MRC grade R/L) 5/5 UE; 5/5 LE.  No observable drift.  Reflexes:   1+ b/l , biceps, triceps, brachioradialis, patella and trace Achilles.  Plantar response downgoing b/l.  Clonus absent.  Sensory examination:   Intact to light touch and pain ,temperature. Proprioception and vibration were decreased in LE's bilaterally.    No spinal sensory level to LT, cold, or vibration.  SLR on right was negative and just caused pain behind right knee.      Labs:   CBC Full  -  ( 27 May 2018 20:09 )  WBC Count : 8.67 K/uL  Hemoglobin : 14.1 g/dL  Hematocrit : 39.6 %  Platelet Count - Automated : 158 K/uL  Mean Cell Volume : 86.7 fL  Mean Cell Hemoglobin : 30.9 pg  Mean Cell Hemoglobin Concentration : 35.6 g/dL  Auto Neutrophil # : 6.33 K/uL  Auto Lymphocyte # : 1.20 K/uL  Auto Monocyte # : 0.99 K/uL  Auto Eosinophil # : 0.06 K/uL  Auto Basophil # : 0.03 K/uL  Auto Neutrophil % : 73.1 %  Auto Lymphocyte % : 13.8 %  Auto Monocyte % : 11.4 %  Auto Eosinophil % : 0.7 %  Auto Basophil % : 0.3 %        136  |  96<L>  |  11  ----------------------------<  91  4.7   |  27  |  0.8    Ca    8.4<L>      28 May 2018 18:50  Mg     1.6         TPro  7.4  /  Alb  4.2  /  TBili  1.1  /  DBili  x   /  AST  61<H>  /  ALT  14  /  AlkPhos  52      LIVER FUNCTIONS - ( 27 May 2018 20:09 )  Alb: 4.2 g/dL / Pro: 7.4 g/dL / ALK PHOS: 52 U/L / ALT: 14 U/L / AST: 61 U/L / GGT: x           PT/INR - ( 27 May 2018 20:09 )   PT: 14.00 sec;   INR: 1.29 ratio         PTT - ( 27 May 2018 20:09 )  PTT:27.0 sec  Urinalysis Basic - ( 28 May 2018 00:10 )    Color: Yellow / Appearance: Clear / S.010 / pH: x  Gluc: x / Ketone: Negative  / Bili: Negative / Urobili: 0.2 mg/dL   Blood: x / Protein: Negative mg/dL / Nitrite: Negative   Leuk Esterase: Negative / RBC: x / WBC x   Sq Epi: x / Non Sq Epi: x / Bacteria: x    ?< from: CT Abdomen and Pelvis w/ IV Cont (18 @ 00:37) >    Findings compatible with recent periumbilical hernia surgery.   Superimposed infectious process is difficult to exclude based on imaging.   No evidence of hernia recurrence.    No evidence of bowel obstruction.    2 small masses in the right breast. Correlation with nonemergent   diagnostic mammogram and sonogram is recommended.    < end of copied text >    MRI lumbar spine w/wo contrast completed, prelim reading - severe spinal stenosis at L3-4.  Disc HNP at this level with disc material causing NF stenosis on right.      Assessment:  This is a 72y Female with h/o recent umbilical hernia repair c/o right lower extremity pain at level of knee and pain in left lower abdomen.  She had essentially unremarkable abdomen and pelvis CT.  She does not have elevated WBC or fever.  She has no evidence for upper motor neuron signs or spinal cord involvement.  Some RLE pain is likely radicular in nature.  Urinary retention is likely secondary to severe spinal stenosis and nerve root impingement.    Plan:   1.  I spoke to neurosurgery regarding patient and MRI results.  Dr. Gotti to see patient tomorrow morning.  2.  Patient to be made NPO after midnight.  3.  Dexamethasone 10 mg IV now then 4 mg IV q6h.    I spoke to on call resident Dr. Cain at 9:45 p.m. regarding need for the NPO orders and dexamethasone to begin tonight.      05-28-18 @ 20:33

## 2018-05-28 NOTE — CONSULT NOTE ADULT - ASSESSMENT
71 y/o female who presents as a transfer from [facility] with c/o right worse than left foot pain, cold to touch, acutely worsened after umbilical hernia repair 3 days ago.     Plan:  f/u venous duplex  f/u arterial duplex  warm compresses for comfort  anti-inflammatory medication for osteo arthritis  attending to see

## 2018-05-28 NOTE — ED PROVIDER NOTE - MEDICAL DECISION MAKING DETAILS
I personally evaluated the patient. I reviewed the Resident’s or Physician Assistant’s note (as assigned above), and agree with the findings and plan except as documented in my note.  Chart reviewed. S/P hernia repair, presents with painful right leg. Exam shows clear lungs, RR S1S2, abdomen soft mild tenderness +BS, no rebound or guarding, cold right foot with weak DP pulse. Labs, CT abdo and venous duplex negative. Will [place] for vascular evaluation.

## 2018-05-28 NOTE — ED PROVIDER NOTE - NS ED ROS FT
Constitutional: no fever, chills, no recent weight loss, change in appetite or malaise  Eyes: no redness/discharge/pain/vision changes  ENT: no rhinorrhea/ear pain/sore throat  Cardiac: No chest pain, SOB or edema.  Respiratory: No cough or respiratory distress  GI: No nausea, vomiting, diarrhea or abdominal pain.  : No dysuria, frequency, urgency or hematuria  MS: + leg pain. no pain to back or extremities, no loss of ROM, no weakness  Neuro: No headache or weakness. No LOC.  Skin: No skin rash.  Endocrine: No history of thyroid disease or diabetes.  Except as documented in the HPI, all other systems are negative.

## 2018-05-28 NOTE — ED PROVIDER NOTE - PHYSICAL EXAMINATION
CONSTITUTIONAL: Well-appearing; well-nourished; in no apparent distress.   EYES: PERRL; EOM intact.   ENT: normal nose; no rhinorrhea; normal pharynx with no tonsillar hypertrophy.   NECK: Supple; non-tender; no cervical lymphadenopathy. No JVD.   CARDIOVASCULAR: Normal S1, S2; no murmurs, rubs, or gallops.   RESPIRATORY: Normal chest excursion with respiration; breath sounds clear and equal bilaterally; no wheezes, rhonchi, or rales.  GI/: obese round and soft abdomen. surgical site noted around umbilicus. + mild erythema around the site. no bleeding and drainage. + generalized tenderness.   MS: + cool right foot with 1+ DP pulses. left foot 2+ DP pulses with warm temp. right leg with painful ROM and generalized tenderness.   SKIN: see MS exam   NEURO/PSYCH: A & O x 4; grossly unremarkable. mood and manner are appropriate. Grooming and personal hygiene are appropriate. No apparent thoughts of harm to self or others.

## 2018-05-28 NOTE — H&P ADULT - NSHPPHYSICALEXAM_GEN_ALL_CORE
T(C): 36.7 (05-28-18 @ 11:09), Max: 38.3 (05-27-18 @ 19:16)  HR: 79 (05-28-18 @ 11:09) (71 - 88)  BP: 139/79 (05-28-18 @ 11:09) (139/79 - 181/78)  RR: 18 (05-28-18 @ 11:09) (18 - 20)  SpO2: 99% (05-28-18 @ 11:09) (95% - 99%)    PHYSICAL EXAM:  GENERAL: NAD, well-developed  HEAD:  Atraumatic, Normocephalic  EYES: EOMI, PERRLA, conjunctiva and sclera clear  ENT:No nasal obstruction or discharge. No tonsillar exudate, swelling or erythema.  NECK: Supple, No JVD  CHEST/LUNG: Clear to auscultation bilaterally; No wheeze  HEART: Regular rate and rhythm; No murmurs, rubs, or gallops  ABDOMEN: Soft, Nontender, Nondistended; Bowel sounds present, surgical site is clean  EXTREMITIES:  2+ Peripheral Pulses. +1 pitting pedal edmea b/l  PSYCH: AAOx3  NEUROLOGY: non-focal  SKIN: No rashes or lesions

## 2018-05-28 NOTE — H&P ADULT - NSHPLABSRESULTS_GEN_ALL_CORE
14.1   8.67  )-----------( 158      ( 27 May 2018 20:09 )             39.6           126<L>  |  90<L>  |  10  ----------------------------<  105<H>  7.0<HH>   |  26  |  0.7    Ca    8.9      27 May 2018 20:09  Mg     1.6         TPro  7.4  /  Alb  4.2  /  TBili  1.1  /  DBili  x   /  AST  61<H>  /  ALT  14  /  AlkPhos  52                Urinalysis Basic - ( 28 May 2018 00:10 )    Color: Yellow / Appearance: Clear / S.010 / pH: x  Gluc: x / Ketone: Negative  / Bili: Negative / Urobili: 0.2 mg/dL   Blood: x / Protein: Negative mg/dL / Nitrite: Negative   Leuk Esterase: Negative / RBC: x / WBC x   Sq Epi: x / Non Sq Epi: x / Bacteria: x        PT/INR - ( 27 May 2018 20:09 )   PT: 14.00 sec;   INR: 1.29 ratio         PTT - ( 27 May 2018 20:09 )  PTT:27.0 sec    Lactate Trend   @ 20:09 Lactate:0.8       CARDIAC MARKERS ( 28 May 2018 07:51 )  x     / x     / 112 U/L / x     / x      CARDIAC MARKERS ( 27 May 2018 20:09 )  x     / <0.01 ng/mL / x     / x     / x

## 2018-05-28 NOTE — ED PROVIDER NOTE - CARE PLAN
Principal Discharge DX:	Arterial insufficiency of lower extremity  Secondary Diagnosis:	Fever Principal Discharge DX:	Arterial insufficiency of lower extremity  Secondary Diagnosis:	Fever  Secondary Diagnosis:	Urinary retention Principal Discharge DX:	Leg pain  Secondary Diagnosis:	Urinary retention

## 2018-05-28 NOTE — ED PROVIDER NOTE - OBJECTIVE STATEMENT
73 yo female hx of HTN/Hypothyroid/DVT to right leg and umbilical hernia s/p hernia repair 2 days ago present c/o worsening right leg pain started today. pain to right leg worsen with movement. pain prevent her from walking. denies worsening abdominal pain/fever/chill/chest pain/sob/coughing/nausea/vomiting/diarrhea and urinary sxs.

## 2018-05-28 NOTE — H&P ADULT - ASSESSMENT
71 y/o female with PMHx of HTN, arthritis hypothyroidism and hx of DVT p/w worsening right leg pain for 1 day. Patient had no evidence of DVT. IN the ED patient was found to have sodium of 126      # Worsening right leg pain   new onset leg pain with chronic arthritis  associated with back pain  no evidence of infection or DVT    -admit to medicine  -pain control (patient already on tramadol form home)  -MRI lumbo, sacral per ED sign out from neurology  -f/u neurology eval  -F/u vascular eval  -PT and rehab    #hyponatremia  new onset  improved with fluid administration  probably 2/2 to dehydration in the setting of recent surgery     f/u BMP daily  plasma osmolality   patient euvolemic  will give IV fluids for 1 day and assess bmp in AM  consider renal eval if no improvement    #HTN  c/w valsartan    #hypothyroidism   c/w levothyroxine    #Asthma ?  c/w inhaler     #arthritis   c/w tramadol    #Others  DASH diet  DVT and GI PPX  from home  Full code 71 y/o female with PMHx of HTN, arthritis hypothyroidism and hx of DVT p/w worsening right leg pain for 1 day. Patient had no evidence of DVT. IN the ED patient was found to have sodium of 126      # Worsening right leg pain   new onset leg pain with chronic arthritis  associated with back pain  no evidence of infection or DVT    -admit to medicine  -pain control (patient already on tramadol form home)  -MRI lumbur spine per ED sign out from neurology  -f/u neurology eval  -F/u vascular eval  -PT and rehab    #hyponatremia  new onset  improved with fluid administration  probably 2/2 to dehydration in the setting of recent surgery and decrease ambulation    f/u BMP daily  plasma osmolality   patient euvolemic  will give IV fluids for 1 day and assess bmp in AM  consider renal eval if no improvement    #Urinary retention  probably 2/2 to pain and decrease ambulation  d/c bonilla and TOV in couple of days    #HTN  c/w valsartan    #hypothyroidism   c/w levothyroxine    #Asthma ?  c/w inhaler     #arthritis   c/w tramadol    #Others  DASH diet  DVT and GI PPX  from home  Full code 73 y/o female with PMHx of HTN, arthritis hypothyroidism and hx of DVT p/w worsening right leg pain for 1 day. Patient had no evidence of DVT. IN the ED patient was found to have sodium of 126      # Worsening right leg pain   new onset leg pain with chronic arthritis  associated with back pain  no evidence of infection or DVT    -admit to medicine  -pain control (patient already on tramadol form home)  -MRI lumbur spine per ED sign out from neurology  -f/u neurology eval  -F/u vascular eval  -PT and rehab    #hyponatremia  new onset  improved with fluid administration  probably 2/2 to dehydration in the setting of recent surgery and decrease ambulation    f/u BMP daily  plasma osmolality , TSH and cortisol level  patient euvolemic  will give IV fluids for 1 day and assess bmp in AM  consider renal eval if no improvement    #Urinary retention  probably 2/2 to pain and decrease ambulation  d/c bonilla and TOV in couple of days    #HTN  c/w valsartan    #hypothyroidism   c/w levothyroxine    #Asthma ?  c/w inhaler     #arthritis   c/w tramadol    #Others  DASH diet  DVT and GI PPX  from home  Full code

## 2018-05-29 LAB
ANION GAP SERPL CALC-SCNC: 14 MMOL/L — SIGNIFICANT CHANGE UP (ref 7–14)
BASOPHILS # BLD AUTO: 0.01 K/UL — SIGNIFICANT CHANGE UP (ref 0–0.2)
BASOPHILS NFR BLD AUTO: 0.1 % — SIGNIFICANT CHANGE UP (ref 0–1)
BUN SERPL-MCNC: 9 MG/DL — LOW (ref 10–20)
CALCIUM SERPL-MCNC: 8.4 MG/DL — LOW (ref 8.5–10.1)
CHLORIDE SERPL-SCNC: 97 MMOL/L — LOW (ref 98–110)
CO2 SERPL-SCNC: 22 MMOL/L — SIGNIFICANT CHANGE UP (ref 17–32)
CORTIS AM PEAK SERPL-MCNC: 11.8 UG/DL — SIGNIFICANT CHANGE UP (ref 6–18.4)
CREAT SERPL-MCNC: 0.6 MG/DL — LOW (ref 0.7–1.5)
CULTURE RESULTS: NO GROWTH — SIGNIFICANT CHANGE UP
EOSINOPHIL # BLD AUTO: 0.01 K/UL — SIGNIFICANT CHANGE UP (ref 0–0.7)
EOSINOPHIL NFR BLD AUTO: 0.1 % — SIGNIFICANT CHANGE UP (ref 0–8)
GLUCOSE SERPL-MCNC: 157 MG/DL — HIGH (ref 70–99)
HCT VFR BLD CALC: 36.6 % — LOW (ref 37–47)
HGB BLD-MCNC: 12.8 G/DL — SIGNIFICANT CHANGE UP (ref 12–16)
IMM GRANULOCYTES NFR BLD AUTO: 0.7 % — HIGH (ref 0.1–0.3)
LYMPHOCYTES # BLD AUTO: 0.76 K/UL — LOW (ref 1.2–3.4)
LYMPHOCYTES # BLD AUTO: 10.9 % — LOW (ref 20.5–51.1)
MAGNESIUM SERPL-MCNC: 1.9 MG/DL — SIGNIFICANT CHANGE UP (ref 1.8–2.4)
MCHC RBC-ENTMCNC: 30.2 PG — SIGNIFICANT CHANGE UP (ref 27–31)
MCHC RBC-ENTMCNC: 35 G/DL — SIGNIFICANT CHANGE UP (ref 32–37)
MCV RBC AUTO: 86.3 FL — SIGNIFICANT CHANGE UP (ref 81–99)
MONOCYTES # BLD AUTO: 0.14 K/UL — SIGNIFICANT CHANGE UP (ref 0.1–0.6)
MONOCYTES NFR BLD AUTO: 2 % — SIGNIFICANT CHANGE UP (ref 1.7–9.3)
NEUTROPHILS # BLD AUTO: 6.01 K/UL — SIGNIFICANT CHANGE UP (ref 1.4–6.5)
NEUTROPHILS NFR BLD AUTO: 86.2 % — HIGH (ref 42.2–75.2)
NRBC # BLD: 0 /100 WBCS — SIGNIFICANT CHANGE UP (ref 0–0)
PHOSPHATE SERPL-MCNC: 2.6 MG/DL — SIGNIFICANT CHANGE UP (ref 2.1–4.9)
PLATELET # BLD AUTO: 277 K/UL — SIGNIFICANT CHANGE UP (ref 130–400)
POTASSIUM SERPL-MCNC: 4.8 MMOL/L — SIGNIFICANT CHANGE UP (ref 3.5–5)
POTASSIUM SERPL-SCNC: 4.8 MMOL/L — SIGNIFICANT CHANGE UP (ref 3.5–5)
RBC # BLD: 4.24 M/UL — SIGNIFICANT CHANGE UP (ref 4.2–5.4)
RBC # FLD: 12.5 % — SIGNIFICANT CHANGE UP (ref 11.5–14.5)
SODIUM SERPL-SCNC: 133 MMOL/L — LOW (ref 135–146)
SPECIMEN SOURCE: SIGNIFICANT CHANGE UP
TSH SERPL-MCNC: 3.14 UIU/ML — SIGNIFICANT CHANGE UP (ref 0.27–4.2)
WBC # BLD: 6.98 K/UL — SIGNIFICANT CHANGE UP (ref 4.8–10.8)
WBC # FLD AUTO: 6.98 K/UL — SIGNIFICANT CHANGE UP (ref 4.8–10.8)

## 2018-05-29 PROCEDURE — 99222 1ST HOSP IP/OBS MODERATE 55: CPT

## 2018-05-29 RX ADMIN — Medication 4 MILLIGRAM(S): at 11:13

## 2018-05-29 RX ADMIN — BUDESONIDE AND FORMOTEROL FUMARATE DIHYDRATE 2 PUFF(S): 160; 4.5 AEROSOL RESPIRATORY (INHALATION) at 11:10

## 2018-05-29 RX ADMIN — PANTOPRAZOLE SODIUM 40 MILLIGRAM(S): 20 TABLET, DELAYED RELEASE ORAL at 05:18

## 2018-05-29 RX ADMIN — MONTELUKAST 10 MILLIGRAM(S): 4 TABLET, CHEWABLE ORAL at 11:13

## 2018-05-29 RX ADMIN — Medication 4 MILLIGRAM(S): at 05:18

## 2018-05-29 RX ADMIN — Medication 1.5 MILLIGRAM(S): at 11:13

## 2018-05-29 RX ADMIN — Medication 4 MILLIGRAM(S): at 17:25

## 2018-05-29 RX ADMIN — TRAMADOL HYDROCHLORIDE 50 MILLIGRAM(S): 50 TABLET ORAL at 23:28

## 2018-05-29 RX ADMIN — ENOXAPARIN SODIUM 40 MILLIGRAM(S): 100 INJECTION SUBCUTANEOUS at 13:52

## 2018-05-29 RX ADMIN — VALSARTAN 160 MILLIGRAM(S): 80 TABLET ORAL at 05:18

## 2018-05-29 RX ADMIN — TRAMADOL HYDROCHLORIDE 50 MILLIGRAM(S): 50 TABLET ORAL at 16:40

## 2018-05-29 RX ADMIN — Medication 4 MILLIGRAM(S): at 22:23

## 2018-05-29 RX ADMIN — Medication 200 MICROGRAM(S): at 05:18

## 2018-05-29 NOTE — PROGRESS NOTE ADULT - ASSESSMENT
73 y/o female with PMHx of HTN, arthritis hypothyroidism and hx of DVT p/w worsening right leg pain for 1 day. Patient had no evidence of DVT. IN the ED patient was found to have sodium of 126      # Worsening right leg pain   new onset leg pain with chronic arthritis  associated with back pain  no evidence of infection or DVT    -admit to medicine  -pain control (patient already on tramadol form home)  -MRI lumbur spine per ED sign out from neurology  -f/u neurology eval  -F/u vascular eval  -PT and rehab    #hyponatremia  new onset  improved with fluid administration  probably 2/2 to dehydration in the setting of recent surgery and decrease ambulation    f/u BMP daily  plasma osmolality , TSH and cortisol level  patient euvolemic  will give IV fluids for 1 day and assess bmp in AM  consider renal eval if no improvement    #Urinary retention  probably 2/2 to pain and decrease ambulation  d/c bonilla and TOV in couple of days    #HTN  c/w valsartan    #hypothyroidism   c/w levothyroxine    #Asthma ?  c/w inhaler     #arthritis   c/w tramadol    #Others  DASH diet  DVT and GI PPX  from home  Full code 73 y/o female with PMHx of HTN, arthritis hypothyroidism and hx of DVT p/w worsening right leg pain for 1 day. Patient had no evidence of DVT. IN the ED patient was found to have sodium of 126      # Worsening right leg pain   -No evidence of DVT or arterial disase  -MRI lumber spine as noted above.  -pain control (patient already on tramadol form home)  -IV decadron per neurology  -Neurosurgery eval > no current intervention, conservative managment with pain control, pt& rehab  -f/u neurology eval  -PT and rehab    #hyponatremia, resolved   probably 2/2 to dehydration in the setting of recent surgery and decrease ambulation  plasma osmolality 275  TSH and cortisol level WNL  f/u BMP      #Urinary retention  probably 2/2 to pain and decrease ambulation  d/c bonilla and TOV Today    #HTN  c/w valsartan    #hypothyroidism   c/w levothyroxine    #Asthma   c/w inhaler     #arthritis   c/w tramadol    #Others  DASH diet  DVT and GI PPX  from home  Full code 71 y/o female with PMHx of HTN, arthritis hypothyroidism and hx of DVT p/w worsening right leg pain for 1 day. Patient had no evidence of DVT. IN the ED patient was found to have sodium of 126      # Worsening right leg pain   -No evidence of DVT or arterial disase  -MRI lumber spine as noted above.  -pain control (patient already on tramadol form home)  -IV decadron per neurology  -Neurosurgery eval > no current intervention, conservative managment with pain control, pt& rehab  -f/u neurology eval  -PT and rehab    #hyponatremia, resolved   hypo-osmolar hyponatremia with euvolemic status   probably 2/2 to dehydration in the setting of recent surgery and decrease ambulation  plasma osmolality 275  TSH and cortisol level WNL  f/u BMP      #Urinary retention  probably 2/2 to pain and decrease ambulation  d/c bonilla and TOV Today    #HTN  c/w valsartan    #hypothyroidism   c/w levothyroxine    #Asthma   c/w inhaler     #arthritis   c/w tramadol    #Others  DASH diet  DVT and GI PPX  from home  Full code

## 2018-05-29 NOTE — CONSULT NOTE ADULT - SUBJECTIVE AND OBJECTIVE BOX
HISTORY OF PRESENT ILLNESS:     73 y/o female with PMHx of HTN, arthritis hypothyroidism and hx of DVT p/w worsening right leg pain for 1 day. patient states it gets worse with movement and she wasn't able to ambulate. it is associated with tingling ,numbness and lower back pain. patient had a recent umbilical hernia surgery 2 days ago. Patient went to the [facility] where they were suspecting DVT and transfer the patient her for vascular eval. patient's work up was negative for venous and arterial pathology. patient was found to have a urinary retention and bonilla was put in with 500 CC came out. Pt stsRLE pain is from Knee to ankle. Does not travel in thigh area. Sts Right leg pain worse than back pain.       PAST MEDICAL & SURGICAL HISTORY:  Osteoarthritis  Blood clot in vein  Hypothyroid  HTN (hypertension)  H/O hernia repair: friday, may 25 2018    Allergies    penicillin (Hives)    Intolerances    MEDICATIONS:  Antibiotics:    Neuro:  clonazePAM Tablet 1.5 milliGRAM(s) Oral daily  traMADol 50 milliGRAM(s) Oral every 4 hours PRN    Anticoagulation:  enoxaparin Injectable 40 milliGRAM(s) SubCutaneous every 24 hours    OTHER:  buDESOnide 160 MICROgram(s)/formoterol 4.5 MICROgram(s) Inhaler 2 Puff(s) Inhalation two times a day  dexamethasone  Injectable 4 milliGRAM(s) IV Push every 6 hours  levothyroxine 200 MICROGram(s) Oral daily  montelukast 10 milliGRAM(s) Oral daily  pantoprazole    Tablet 40 milliGRAM(s) Oral before breakfast  valsartan 160 milliGRAM(s) Oral daily    IVF:      Vital Signs Last 24 Hrs  T(C): 35.5 (29 May 2018 07:32), Max: 37.3 (28 May 2018 23:47)  T(F): 95.9 (29 May 2018 07:32), Max: 99.1 (28 May 2018 23:47)  HR: 82 (29 May 2018 07:32) (82 - 87)  BP: 141/68 (29 May 2018 07:32) (115/57 - 141/68)  BP(mean): --  RR: 18 (29 May 2018 07:32) (18 - 18)  SpO2: --    PHYSICAL EXAM:  Strength 5/5  Sensation intact to light touch  KJ 2+ B/L  No LROM of Lspine    LABS:                        12.8   6.98  )-----------( 277      ( 29 May 2018 06:22 )             36.6         133<L>  |  97<L>  |  9<L>  ----------------------------<  157<H>  4.8   |  22  |  0.6<L>    Ca    8.4<L>      29 May 2018 06:22  Phos  2.6       Mg     1.9         TPro  7.4  /  Alb  4.2  /  TBili  1.1  /  DBili  x   /  AST  61<H>  /  ALT  14  /  AlkPhos  52      PT/INR - ( 27 May 2018 20:09 )   PT: 14.00 sec;   INR: 1.29 ratio         PTT - ( 27 May 2018 20:09 )  PTT:27.0 sec  Urinalysis Basic - ( 28 May 2018 00:10 )    Color: Yellow / Appearance: Clear / S.010 / pH: x  Gluc: x / Ketone: Negative  / Bili: Negative / Urobili: 0.2 mg/dL   Blood: x / Protein: Negative mg/dL / Nitrite: Negative   Leuk Esterase: Negative / RBC: x / WBC x   Sq Epi: x / Non Sq Epi: x / Bacteria: x      CULTURES:  Culture Results:   No growth to date. ( @ 20:09)  Culture Results:   No growth to date. ( @ 20:09)      RADIOLOGY & ADDITIONAL STUDIES:  < from: MR Lumbar Spine w/wo IV Cont (18 @ 20:35) >  1.  Severe spinal canal stenosis and moderate to severe bilateral   neuroforaminal narrowing at L3-4 secondary to moderate to large diffuse   disc bulge with superimposed right neural foraminal disc protrusion and   severe bilateral facet and ligamentous hypertrophic change.    2.  Multilevel diffuse disc bulges L1-2, L2-3, L4-5 and L5-S1 resulting   in flattening of the ventral thecal sac at L1-2, L2-3, L4-5 and mild to   moderate bilateral neuroforaminal narrowing at L4-5 and L5-S1.    3.  Disc desiccation throughout the lumbar spine worse at L3-4 and L4-5.    4.  Fatty infiltration paraspinal muscles throughout the lumbar spine   worse at L4-5 and L5-S1.    5.  No abnormal enhancing masses or abscess.    < end of copied text >    Assessment:  As above    Plan:  No Neurosurgical Intervention at this time.   Recommend PT/Pain Mgmt  Pt can F/U with Dr. Gotti as o/p.

## 2018-05-29 NOTE — CONSULT NOTE ADULT - ATTENDING COMMENTS
Briefly, patient presents with pain and difficulty ambulating.  Right far lateral disc L4-5 and L3-4 stenosis.  Feels better now.  Ambulates now.  Neurologically intact.  Recommend PT/conservative management.  Patient see me as outpatient.
Chronic right leg pain, ? arthritis, Has palpable pulses in both legs with normal arterial and venous duplex. No vascular intervention.

## 2018-05-29 NOTE — CONSULT NOTE ADULT - ASSESSMENT
IMPRESSION: Rehab of gait dysfunction      PRECAUTIONS: [  ] Cardiac  [  ] Respiratory  [  ] Seizures [  ] Contact Isolation  [  ] Droplet Isolation  [  ] Other    Weight Bearing Status:     RECOMMENDATION:    Out of Bed to Chair     DVT/Decubiti Prophylaxis    REHAB PLAN:     [ x  ] Bedside P/T 3-5 times a week   [   ]   Bedside O/T  2-3 times a week             [   ] No Rehab Therapy Indicated                   [   ]  Speech Therapy   Conditioning/ROM                                    ADL  Bed Mobility                                               Conditioning/ROM  Transfers                                                     Bed Mobility  Sitting /Standing Balance                         Transfers                                        Gait Training                                               Sitting/Standing Balance  Stair Training [   ]Applicable                    Home equipment Eval                                                                        Splinting  [   ] Only      GOALS:   ADL   [ x  ]   Independent                    Transfers  [ x  ] Independent                          Ambulation  [ x  ] Independent     [  x  ] With device                            [   ]  CG                                                         [   ]  CG                                                                  [   ] CG                            [    ] Min A                                                   [   ] Min A                                                              [   ] Min  A          DISCHARGE PLAN:   [   ]  Good candidate for Intensive Rehabilitation/Hospital based-[facility]A [facility]                                             Will tolerate 3hrs Intensive Rehab Daily                                       [  x  ]  Short Term Rehab in Skilled Nursing Facility                               vs        [ x   ]  Home with Outpatient or VN services                                         [    ]  Possible Candidate for Intensive Hospital based Rehab

## 2018-05-29 NOTE — PROGRESS NOTE ADULT - SUBJECTIVE AND OBJECTIVE BOX
SUBJECTIVE:     Patient is a 72y old Female who presents with a chief complaint of worsening right leg pain for 1 day (28 May 2018 11:16)    Currently admitted to medicine with the primary diagnosis of Leg pain     Today is hospital day 1d. This morning she is resting comfortably in bed and reports no new issues or overnight events.     PAST MEDICAL & SURGICAL HISTORY  Osteoarthritis  Blood clot in vein  Hypothyroid  HTN (hypertension)  H/O hernia repair: friday, may 25 2018    SOCIAL HISTORY:  Negative for smoking/alcohol/drug use.     ALLERGIES:  penicillin (Hives)    MEDICATIONS:  STANDING MEDICATIONS  buDESOnide 160 MICROgram(s)/formoterol 4.5 MICROgram(s) Inhaler 2 Puff(s) Inhalation two times a day  clonazePAM Tablet 1.5 milliGRAM(s) Oral daily  dexamethasone  Injectable 4 milliGRAM(s) IV Push every 6 hours  enoxaparin Injectable 40 milliGRAM(s) SubCutaneous every 24 hours  levothyroxine 200 MICROGram(s) Oral daily  montelukast 10 milliGRAM(s) Oral daily  pantoprazole    Tablet 40 milliGRAM(s) Oral before breakfast  valsartan 160 milliGRAM(s) Oral daily    PRN MEDICATIONS  traMADol 50 milliGRAM(s) Oral every 4 hours PRN    VITALS:   T(F): 95.9  HR: 82  BP: 141/68  RR: 18  SpO2: 99%    PHYSICAL EXAM:  GEN: No acute distress  LUNGS: Clear to auscultation bilaterally   HEART: S1/S2 present. RRR.   ABD: Soft, non-tender, non-distended. Bowel sounds present  EXT: NC/NC/+1 pitting pedal edmea b/l/2+PP/MARTINEZ  NEURO: AAOX3      LABS:                        14.1   8.67  )-----------( 158      ( 27 May 2018 20:09 )             39.6     -    136  |  96<L>  |  11  ----------------------------<  91  4.7   |  27  |  0.8    Ca    8.4<L>      28 May 2018 18:50  Mg     1.6         TPro  7.4  /  Alb  4.2  /  TBili  1.1  /  DBili  x   /  AST  61<H>  /  ALT  14  /  AlkPhos  52      PT/INR - ( 27 May 2018 20:09 )   PT: 14.00 sec;   INR: 1.29 ratio         PTT - ( 27 May 2018 20:09 )  PTT:27.0 sec  Urinalysis Basic - ( 28 May 2018 00:10 )    Color: Yellow / Appearance: Clear / S.010 / pH: x  Gluc: x / Ketone: Negative  / Bili: Negative / Urobili: 0.2 mg/dL   Blood: x / Protein: Negative mg/dL / Nitrite: Negative   Leuk Esterase: Negative / RBC: x / WBC x   Sq Epi: x / Non Sq Epi: x / Bacteria: x          Creatine Kinase, Serum: 112 U/L (18 @ 07:51)      Culture - Blood (collected 27 May 2018 20:09)  Source: .Blood Blood  Preliminary Report (29 May 2018 03:01):    No growth to date.    Culture - Blood (collected 27 May 2018 20:09)  Source: .Blood Blood  Preliminary Report (29 May 2018 03:01):    No growth to date.      CARDIAC MARKERS ( 28 May 2018 07:51 )  x     / x     / 112 U/L / x     / x      CARDIAC MARKERS ( 27 May 2018 20:09 )  x     / <0.01 ng/mL / x     / x     / x            RADIOLOGY:      < from: CT Abdomen and Pelvis w/ IV Cont (18 @ 00:37) >  IMPRESSION:      Findings compatible with recent periumbilical hernia surgery.   Superimposed infectious process is difficult to exclude based on imaging.   No evidence of hernia recurrence.    No evidence of bowel obstruction.    2 small masses in the right breast. Correlation with nonemergent   diagnostic mammogram and sonogram is recommended.      < from: [facility] Duplex Low Ext Arterial, Ltd, Right (18 @ 08:47) >    Impression:    No evidence of hemodynamically significant arterial disease in right   lower extremity.    < from: [facility] Duplex Lower Ext Vein Scan, Bilat (18 @ 00:25) >  Impression:    No evidence of deep venous thrombosis or superficial thrombophlebitis in   bilateral lower extremities.    Right Baker's cyst.      < from: Xray Chest 1 View AP/PA (18 @ 20:45) >  Impression:      No radiographic evidence of acute cardiopulmonary disease. SUBJECTIVE:     Patient is a 72y old Female who presents with a chief complaint of worsening right leg pain for 1 day (28 May 2018 11:16)    Currently admitted to medicine with the primary diagnosis of Leg pain     Today is hospital day 1d. This morning she is resting comfortably in bed and reports no new issues or overnight events.     PAST MEDICAL & SURGICAL HISTORY  Osteoarthritis  Blood clot in vein  Hypothyroid  HTN (hypertension)  H/O hernia repair: friday, may 25 2018    SOCIAL HISTORY:  Negative for smoking/alcohol/drug use.     ALLERGIES:  penicillin (Hives)    MEDICATIONS:  STANDING MEDICATIONS  buDESOnide 160 MICROgram(s)/formoterol 4.5 MICROgram(s) Inhaler 2 Puff(s) Inhalation two times a day  clonazePAM Tablet 1.5 milliGRAM(s) Oral daily  dexamethasone  Injectable 4 milliGRAM(s) IV Push every 6 hours  enoxaparin Injectable 40 milliGRAM(s) SubCutaneous every 24 hours  levothyroxine 200 MICROGram(s) Oral daily  montelukast 10 milliGRAM(s) Oral daily  pantoprazole    Tablet 40 milliGRAM(s) Oral before breakfast  valsartan 160 milliGRAM(s) Oral daily    PRN MEDICATIONS  traMADol 50 milliGRAM(s) Oral every 4 hours PRN    VITALS:   T(F): 95.9  HR: 82  BP: 141/68  RR: 18  SpO2: 99%    PHYSICAL EXAM:  GEN: No acute distress  LUNGS: Clear to auscultation bilaterally   HEART: S1/S2 present. RRR.   ABD: Soft, non-tender, non-distended. Bowel sounds present  EXT: NC/NC/+1 pitting pedal edmea b/l/2+PP/MARTINEZ  NEURO: AAOX3      LABS:                                   12.8   6.98  )-----------( 277      ( 29 May 2018 06:22 )             36.6             05-29    133<L>  |  97<L>  |  9<L>  ----------------------------<  157<H>  4.8   |  22  |  0.6<L>    Ca    8.4<L>      29 May 2018 06:22  Phos  2.6     05-  Mg     1.9     -    TPro  7.4  /  Alb  4.2  /  TBili  1.1  /  DBili  x   /  AST  61<H>  /  ALT  14  /  AlkPhos  52  05-27    LIVER FUNCTIONS - ( 27 May 2018 20:09 )  Alb: 4.2 g/dL / Pro: 7.4 g/dL / ALK PHOS: 52 U/L / ALT: 14 U/L / AST: 61 U/L / GGT: x                 PT/INR - ( 27 May 2018 20:09 )   PT: 14.00 sec;   INR: 1.29 ratio         PTT - ( 27 May 2018 20:09 )  PTT:27.0 sec  CARDIAC MARKERS ( 28 May 2018 07:51 )  x     / x     / 112 U/L / x     / x      CARDIAC MARKERS ( 27 May 2018 20:09 )  x     / <0.01 ng/mL / x     / x     / x            Urinalysis Basic - ( 28 May 2018 00:10 )    Color: Yellow / Appearance: Clear / S.010 / pH: x  Gluc: x / Ketone: Negative  / Bili: Negative / Urobili: 0.2 mg/dL   Blood: x / Protein: Negative mg/dL / Nitrite: Negative   Leuk Esterase: Negative / RBC: x / WBC x   Sq Epi: x / Non Sq Epi: x / Bacteria: x        Culture - Blood (collected 27 May 2018 20:09)  Source: .Blood Blood  Preliminary Report (29 May 2018 03:01):    No growth to date.    Culture - Blood (collected 27 May 2018 20:09)  Source: .Blood Blood  Preliminary Report (29 May 2018 03:01):    No growth to date.            RADIOLOGY:      < from: CT Abdomen and Pelvis w/ IV Cont (18 @ 00:37) >  IMPRESSION:      Findings compatible with recent periumbilical hernia surgery.   Superimposed infectious process is difficult to exclude based on imaging.   No evidence of hernia recurrence.    No evidence of bowel obstruction.    2 small masses in the right breast. Correlation with nonemergent   diagnostic mammogram and sonogram is recommended.      < from: VA Duplex Low Ext Arterial, Ltd, Right (18 @ 08:47) >    Impression:    No evidence of hemodynamically significant arterial disease in right   lower extremity.    < from: VA Duplex Lower Ext Vein Scan, Bilat (18 @ 00:25) >  Impression:    No evidence of deep venous thrombosis or superficial thrombophlebitis in   bilateral lower extremities.    Right Baker's cyst.      < from: Xray Chest 1 View AP/PA (18 @ 20:45) >  Impression:      No radiographic evidence of acute cardiopulmonary disease.      < from: MR Lumbar Spine w/wo IV Cont (18 @ 20:35) >    Impression:     1.  Severe spinal canal stenosis and moderate to severe bilateral   neuroforaminal narrowing at L3-4 secondary to moderate to large diffuse   disc bulge with superimposed right neural foraminal disc protrusion and   severe bilateral facet and ligamentous hypertrophic change.    2.  Multilevel diffuse disc bulges L1-2, L2-3, L4-5 and L5-S1 resulting   in flattening of the ventral thecal sac at L1-2, L2-3, L4-5 and mild to   moderate bilateral neuroforaminal narrowing at L4-5 and L5-S1.    3.  Disc desiccation throughout the lumbar spine worse at L3-4 and L4-5.    4.  Fatty infiltration paraspinal muscles throughout the lumbar spine   worse at L4-5 and L5-S1.    5.  No abnormal enhancing masses or abscess.

## 2018-05-29 NOTE — CONSULT NOTE ADULT - SUBJECTIVE AND OBJECTIVE BOX
HPI:  71 y/o female with PMHx of HTN, arthritis hypothyroidism and hx of DVT p/w worsening right leg pain for 1 day. patient states it gets worse with movement and she wasn't able to ambulate. it is associated with tingling ,numbness and lower back pain. patient had a recent umbilical hernia surgery 2 days ago. Patient went to the [facility] where they were suspecting DVT and transfer the patient her for vascular eval. patient's work up was negative for venous and arterial pathology. patient was found to have a urinary retention and bonilla was put in with 500 CC came out.  Patient denies fever, sweating, CP, cough, n/v/d, urinary and bowel problems. She denies headache, blurry vision and focal weakness.    In the ED patient was given cipro, flagyl and LR (28 May 2018 11:16)      PAST MEDICAL & SURGICAL HISTORY:  Osteoarthritis  Blood clot in vein  Hypothyroid  HTN (hypertension)  H/O hernia repair: friday, may 25 2018      Hospital Course:    TODAY'S SUBJECTIVE & REVIEW OF SYMPTOMS:     Constitutional WNL   Cardio WNL   Resp WNL   GI abd pain  Heme WNL  Endo WNL  Skin WNL  MSK WNL  Neuro WNL  Cognitive WNL  Psych WNL      MEDICATIONS  (STANDING):  buDESOnide 160 MICROgram(s)/formoterol 4.5 MICROgram(s) Inhaler 2 Puff(s) Inhalation two times a day  clonazePAM Tablet 1.5 milliGRAM(s) Oral daily  dexamethasone  Injectable 4 milliGRAM(s) IV Push every 6 hours  enoxaparin Injectable 40 milliGRAM(s) SubCutaneous every 24 hours  levothyroxine 200 MICROGram(s) Oral daily  montelukast 10 milliGRAM(s) Oral daily  pantoprazole    Tablet 40 milliGRAM(s) Oral before breakfast  valsartan 160 milliGRAM(s) Oral daily    MEDICATIONS  (PRN):  traMADol 50 milliGRAM(s) Oral every 4 hours PRN Severe Pain (7 - 10)      FAMILY HISTORY:      Allergies    penicillin (Hives)    Intolerances        SOCIAL HISTORY:    [  ] Etoh  [  ] Smoking  [  ] Substance abuse     Home Environment:  [  ] Home Alone  [  ] Lives with Family  [  ] Home Health Aid    Dwelling:  [  ] Apartment  [ x ] Private House  [  ] Adult Home  [  ] Skilled Nursing Facility      [  ] Short Term  [  ] Long Term  [  ] Stairs       Elevator [  ]    FUNCTIONAL STATUS PTA: (Check all that apply)  Ambulation: [ x  ]Independent    [  ] Dependent     [  ] Non-Ambulatory  Assistive Device: [x  ] SA Cane  [  ]  Q Cane  [  ] Walker  [  ]  Wheelchair  ADL : [x  ] Independent  [  ]  Dependent       Vital Signs Last 24 Hrs  T(C): 35.5 (29 May 2018 07:32), Max: 37.3 (28 May 2018 23:47)  T(F): 95.9 (29 May 2018 07:32), Max: 99.1 (28 May 2018 23:47)  HR: 82 (29 May 2018 07:32) (79 - 87)  BP: 141/68 (29 May 2018 07:32) (115/57 - 141/68)  BP(mean): --  RR: 18 (29 May 2018 07:32) (18 - 18)  SpO2: 99% (28 May 2018 11:09) (99% - 99%)      PHYSICAL EXAM: Alert & Oriented X3  GENERAL: NAD, well-groomed, well-developed  HEAD:  Atraumatic, Normocephalic  CHEST/LUNG: Clear   HEART: Regular rate   ABDOMEN: Soft, Nontender  EXTREMITIES:  no calf tenderness    NERVOUS SYSTEM:  Cranial Nerves 2-12 intact [  ] Abnormal  [  ]  ROM: WFL all extremities [ x ]  Abnormal [  ]  Motor Strength: WFL all extremities  [x  ]  Abnormal [  ]  Sensation: intact to light touch [x  ] Abnormal [  ]  Reflexes: Symmetric [  ]  Abnormal [  ]    FUNCTIONAL STATUS:  Bed Mobility: Independent [  ]  Supervision [  ]  Needs Assistance [x  ]  N/A [  ]  Transfers: Independent [  ]  Supervision [  ]  Needs Assistance [x  ]  N/A [  ]   Ambulation: Independent [  ]  Supervision [  ]  Needs Assistance [ x ]  N/A [  ]  ADL: Independent [  ] Requires Assistance [  ] N/A [  ]      LABS:                        12.8   6.98  )-----------( 277      ( 29 May 2018 06:22 )             36.6     -    133<L>  |  97<L>  |  9<L>  ----------------------------<  157<H>  4.8   |  22  |  0.6<L>    Ca    8.4<L>      29 May 2018 06:22  Phos  2.6       Mg     1.9         TPro  7.4  /  Alb  4.2  /  TBili  1.1  /  DBili  x   /  AST  61<H>  /  ALT  14  /  AlkPhos  52      PT/INR - ( 27 May 2018 20:09 )   PT: 14.00 sec;   INR: 1.29 ratio         PTT - ( 27 May 2018 20:09 )  PTT:27.0 sec  Urinalysis Basic - ( 28 May 2018 00:10 )    Color: Yellow / Appearance: Clear / S.010 / pH: x  Gluc: x / Ketone: Negative  / Bili: Negative / Urobili: 0.2 mg/dL   Blood: x / Protein: Negative mg/dL / Nitrite: Negative   Leuk Esterase: Negative / RBC: x / WBC x   Sq Epi: x / Non Sq Epi: x / Bacteria: x        RADIOLOGY & ADDITIONAL STUDIES:    Assesment:

## 2018-05-30 ENCOUNTER — TRANSCRIPTION ENCOUNTER (OUTPATIENT)
Age: 73
End: 2018-05-30

## 2018-05-30 VITALS
TEMPERATURE: 97 F | DIASTOLIC BLOOD PRESSURE: 65 MMHG | SYSTOLIC BLOOD PRESSURE: 124 MMHG | RESPIRATION RATE: 18 BRPM | HEART RATE: 55 BPM

## 2018-05-30 DIAGNOSIS — Z88.0 ALLERGY STATUS TO PENICILLIN: ICD-10-CM

## 2018-05-30 DIAGNOSIS — K43.6 OTHER AND UNSPECIFIED VENTRAL HERNIA WITH OBSTRUCTION, WITHOUT GANGRENE: ICD-10-CM

## 2018-05-30 DIAGNOSIS — E03.9 HYPOTHYROIDISM, UNSPECIFIED: ICD-10-CM

## 2018-05-30 DIAGNOSIS — I10 ESSENTIAL (PRIMARY) HYPERTENSION: ICD-10-CM

## 2018-05-30 DIAGNOSIS — F32.9 MAJOR DEPRESSIVE DISORDER, SINGLE EPISODE, UNSPECIFIED: ICD-10-CM

## 2018-05-30 LAB
ANION GAP SERPL CALC-SCNC: 15 MMOL/L — HIGH (ref 7–14)
BASOPHILS # BLD AUTO: 0.01 K/UL — SIGNIFICANT CHANGE UP (ref 0–0.2)
BASOPHILS NFR BLD AUTO: 0.1 % — SIGNIFICANT CHANGE UP (ref 0–1)
BUN SERPL-MCNC: 17 MG/DL — SIGNIFICANT CHANGE UP (ref 10–20)
CALCIUM SERPL-MCNC: 8.7 MG/DL — SIGNIFICANT CHANGE UP (ref 8.5–10.1)
CHLORIDE SERPL-SCNC: 94 MMOL/L — LOW (ref 98–110)
CO2 SERPL-SCNC: 24 MMOL/L — SIGNIFICANT CHANGE UP (ref 17–32)
CREAT SERPL-MCNC: 0.7 MG/DL — SIGNIFICANT CHANGE UP (ref 0.7–1.5)
EOSINOPHIL # BLD AUTO: 0 K/UL — SIGNIFICANT CHANGE UP (ref 0–0.7)
EOSINOPHIL NFR BLD AUTO: 0 % — SIGNIFICANT CHANGE UP (ref 0–8)
GLUCOSE SERPL-MCNC: 176 MG/DL — HIGH (ref 70–99)
HCT VFR BLD CALC: 37.6 % — SIGNIFICANT CHANGE UP (ref 37–47)
HGB BLD-MCNC: 13 G/DL — SIGNIFICANT CHANGE UP (ref 12–16)
IMM GRANULOCYTES NFR BLD AUTO: 0.6 % — HIGH (ref 0.1–0.3)
LYMPHOCYTES # BLD AUTO: 1.12 K/UL — LOW (ref 1.2–3.4)
LYMPHOCYTES # BLD AUTO: 11.3 % — LOW (ref 20.5–51.1)
MAGNESIUM SERPL-MCNC: 1.9 MG/DL — SIGNIFICANT CHANGE UP (ref 1.8–2.4)
MCHC RBC-ENTMCNC: 29.9 PG — SIGNIFICANT CHANGE UP (ref 27–31)
MCHC RBC-ENTMCNC: 34.6 G/DL — SIGNIFICANT CHANGE UP (ref 32–37)
MCV RBC AUTO: 86.4 FL — SIGNIFICANT CHANGE UP (ref 81–99)
MONOCYTES # BLD AUTO: 0.27 K/UL — SIGNIFICANT CHANGE UP (ref 0.1–0.6)
MONOCYTES NFR BLD AUTO: 2.7 % — SIGNIFICANT CHANGE UP (ref 1.7–9.3)
NEUTROPHILS # BLD AUTO: 8.45 K/UL — HIGH (ref 1.4–6.5)
NEUTROPHILS NFR BLD AUTO: 85.3 % — HIGH (ref 42.2–75.2)
NRBC # BLD: 0 /100 WBCS — SIGNIFICANT CHANGE UP (ref 0–0)
PHOSPHATE SERPL-MCNC: 2.6 MG/DL — SIGNIFICANT CHANGE UP (ref 2.1–4.9)
PLATELET # BLD AUTO: 375 K/UL — SIGNIFICANT CHANGE UP (ref 130–400)
POTASSIUM SERPL-MCNC: 4.9 MMOL/L — SIGNIFICANT CHANGE UP (ref 3.5–5)
POTASSIUM SERPL-SCNC: 4.9 MMOL/L — SIGNIFICANT CHANGE UP (ref 3.5–5)
RBC # BLD: 4.35 M/UL — SIGNIFICANT CHANGE UP (ref 4.2–5.4)
RBC # FLD: 12.5 % — SIGNIFICANT CHANGE UP (ref 11.5–14.5)
SODIUM SERPL-SCNC: 133 MMOL/L — LOW (ref 135–146)
WBC # BLD: 9.91 K/UL — SIGNIFICANT CHANGE UP (ref 4.8–10.8)
WBC # FLD AUTO: 9.91 K/UL — SIGNIFICANT CHANGE UP (ref 4.8–10.8)

## 2018-05-30 RX ORDER — DOCUSATE SODIUM 100 MG
100 CAPSULE ORAL
Qty: 0 | Refills: 0 | Status: DISCONTINUED | OUTPATIENT
Start: 2018-05-30 | End: 2018-05-30

## 2018-05-30 RX ORDER — TRAMADOL HYDROCHLORIDE 50 MG/1
0 TABLET ORAL
Qty: 0 | Refills: 0 | COMMUNITY

## 2018-05-30 RX ORDER — LEVOTHYROXINE SODIUM 125 MCG
1 TABLET ORAL
Qty: 0 | Refills: 0 | COMMUNITY

## 2018-05-30 RX ORDER — PANTOPRAZOLE SODIUM 20 MG/1
1 TABLET, DELAYED RELEASE ORAL
Qty: 30 | Refills: 0 | OUTPATIENT
Start: 2018-05-30 | End: 2018-06-28

## 2018-05-30 RX ORDER — SENNA PLUS 8.6 MG/1
2 TABLET ORAL ONCE
Qty: 0 | Refills: 0 | Status: COMPLETED | OUTPATIENT
Start: 2018-05-30 | End: 2018-05-30

## 2018-05-30 RX ORDER — TRAMADOL HYDROCHLORIDE 50 MG/1
1 TABLET ORAL
Qty: 0 | Refills: 0 | COMMUNITY

## 2018-05-30 RX ORDER — DOCUSATE SODIUM 100 MG
1 CAPSULE ORAL
Qty: 0 | Refills: 0 | COMMUNITY
Start: 2018-05-30

## 2018-05-30 RX ORDER — MONTELUKAST 4 MG/1
0 TABLET, CHEWABLE ORAL
Qty: 0 | Refills: 0 | COMMUNITY

## 2018-05-30 RX ORDER — NABUMETONE 750 MG
2 TABLET ORAL
Qty: 0 | Refills: 0 | COMMUNITY

## 2018-05-30 RX ORDER — CLONAZEPAM 1 MG
15 TABLET ORAL
Qty: 0 | Refills: 0 | COMMUNITY

## 2018-05-30 RX ORDER — HYDROXYZINE HCL 10 MG
0 TABLET ORAL
Qty: 0 | Refills: 0 | COMMUNITY

## 2018-05-30 RX ORDER — VALSARTAN 80 MG/1
1 TABLET ORAL
Qty: 0 | Refills: 0 | COMMUNITY

## 2018-05-30 RX ORDER — POLYETHYLENE GLYCOL 3350 17 G/17G
17 POWDER, FOR SOLUTION ORAL AT BEDTIME
Qty: 0 | Refills: 0 | Status: DISCONTINUED | OUTPATIENT
Start: 2018-05-30 | End: 2018-05-30

## 2018-05-30 RX ORDER — LEVOTHYROXINE SODIUM 125 MCG
0 TABLET ORAL
Qty: 0 | Refills: 0 | COMMUNITY

## 2018-05-30 RX ORDER — GABAPENTIN 400 MG/1
1 CAPSULE ORAL
Qty: 90 | Refills: 0 | OUTPATIENT
Start: 2018-05-30 | End: 2018-06-28

## 2018-05-30 RX ADMIN — TRAMADOL HYDROCHLORIDE 50 MILLIGRAM(S): 50 TABLET ORAL at 13:50

## 2018-05-30 RX ADMIN — Medication 200 MICROGRAM(S): at 05:02

## 2018-05-30 RX ADMIN — Medication 4 MILLIGRAM(S): at 12:22

## 2018-05-30 RX ADMIN — ENOXAPARIN SODIUM 40 MILLIGRAM(S): 100 INJECTION SUBCUTANEOUS at 13:20

## 2018-05-30 RX ADMIN — Medication 1.5 MILLIGRAM(S): at 11:16

## 2018-05-30 RX ADMIN — TRAMADOL HYDROCHLORIDE 50 MILLIGRAM(S): 50 TABLET ORAL at 13:20

## 2018-05-30 RX ADMIN — Medication 4 MILLIGRAM(S): at 05:01

## 2018-05-30 RX ADMIN — Medication 100 MILLIGRAM(S): at 05:00

## 2018-05-30 RX ADMIN — MONTELUKAST 10 MILLIGRAM(S): 4 TABLET, CHEWABLE ORAL at 12:22

## 2018-05-30 RX ADMIN — VALSARTAN 160 MILLIGRAM(S): 80 TABLET ORAL at 05:01

## 2018-05-30 RX ADMIN — SENNA PLUS 2 TABLET(S): 8.6 TABLET ORAL at 05:00

## 2018-05-30 RX ADMIN — PANTOPRAZOLE SODIUM 40 MILLIGRAM(S): 20 TABLET, DELAYED RELEASE ORAL at 05:01

## 2018-05-30 NOTE — DISCHARGE NOTE ADULT - MEDICATION SUMMARY - MEDICATIONS TO TAKE
I will START or STAY ON the medications listed below when I get home from the hospital:    predniSONE 10 mg oral tablet  -- 4 tabs for 1 day then  3 tabs for 1 days then  2 tabs for 1 day then  1 tab for 1 day then stop  -- It is very important that you take or use this exactly as directed.  Do not skip doses or discontinue unless directed by your doctor.  Obtain medical advice before taking any non-prescription drugs as some may affect the action of this medication.  Take with food or milk.    -- Indication: For Disc herniation    Naprosyn 500 mg oral tablet  -- 1 tab(s) by mouth every 12 hours   -- Check with your doctor before becoming pregnant.  May cause drowsiness or dizziness.  Obtain medical advice before taking any non-prescription drugs as some may affect the action of this medication.  Take with food or milk.    -- Indication: For Disc herniation    valsartan 160 mg oral capsule  -- orally once a day  -- Indication: For Hypertension    gabapentin 100 mg oral capsule  -- 1 cap(s) by mouth every 8 hours x 30 days   -- It is very important that you take or use this exactly as directed.  Do not skip doses or discontinue unless directed by your doctor.  May cause drowsiness.  Alcohol may intensify this effect.  Use care when operating dangerous machinery.    -- Indication: For Disc herniation    clonazePAM  -- 15 tab(s) by mouth once a day  -- Indication: For anxeity    Dulera 100 mcg-5 mcg/inh inhalation aerosol  -- 2 puff(s) inhaled 2 times a day  -- Indication: For asthma    docusate sodium 100 mg oral capsule  -- 1 cap(s) by mouth 2 times a day  -- Indication: For Constiaption    montelukast 10 mg oral tablet  -- 1 tab(s) by mouth once a day  -- Indication: For Asthma    pantoprazole 40 mg oral delayed release tablet  -- 1 tab(s) by mouth once a day (before a meal)  -- Indication: For GI PPX    levothyroxine 200 mcg (0.2 mg) oral tablet  -- 1 tab(s) by mouth once a day  -- Indication: For Hypothyroid I will START or STAY ON the medications listed below when I get home from the hospital:    predniSONE 10 mg oral tablet  -- 4 tabs for 1 day then  3 tabs for 1 days then  2 tabs for 1 day then  1 tab for 1 day then stop  -- It is very important that you take or use this exactly as directed.  Do not skip doses or discontinue unless directed by your doctor.  Obtain medical advice before taking any non-prescription drugs as some may affect the action of this medication.  Take with food or milk.    -- Indication: For Disc herniation    Naprosyn 500 mg oral tablet  -- 1 tab(s) by mouth every 12 hours   -- Check with your doctor before becoming pregnant.  May cause drowsiness or dizziness.  Obtain medical advice before taking any non-prescription drugs as some may affect the action of this medication.  Take with food or milk.    -- Indication: For Disc herniation    valsartan 160 mg oral capsule  -- orally once a day  -- Indication: For Hypertension    gabapentin 100 mg oral capsule  -- 1 cap(s) by mouth every 8 hours x 30 days   -- It is very important that you take or use this exactly as directed.  Do not skip doses or discontinue unless directed by your doctor.  May cause drowsiness.  Alcohol may intensify this effect.  Use care when operating dangerous machinery.    -- Indication: For Disc herniation    clonazePAM  -- 1.5 milligram(s) by mouth once a day  -- Indication: For anxiety    Dulera 100 mcg-5 mcg/inh inhalation aerosol  -- 2 puff(s) inhaled 2 times a day  -- Indication: For asthma    docusate sodium 100 mg oral capsule  -- 1 cap(s) by mouth 2 times a day  -- Indication: For Constiaption    montelukast 10 mg oral tablet  -- 1 tab(s) by mouth once a day  -- Indication: For Asthma    pantoprazole 40 mg oral delayed release tablet  -- 1 tab(s) by mouth once a day (before a meal)  -- Indication: For GI PPX    levothyroxine 200 mcg (0.2 mg) oral tablet  -- 1 tab(s) by mouth once a day  -- Indication: For Hypothyroid

## 2018-05-30 NOTE — PROGRESS NOTE ADULT - ASSESSMENT
73 y/o female with PMHx of HTN, arthritis hypothyroidism and hx of DVT p/w worsening right leg pain for 1 day. Patient had no evidence of DVT. IN the ED patient was found to have sodium of 126      # Worsening right leg pain   -No evidence of DVT or arterial disase  -MRI lumber spine as noted above.  -pain control (patient already on tramadol form home)  -IV decadron per neurology  -Neurosurgery eval > no current intervention, conservative management with pain control, pt& rehab  -f/u neurology eval  -PT and rehab > short term rehab vs home with services     #hyponatremia, resolved   hypo-osmolar hyponatremia with euvolemic status   probably 2/2 to dehydration in the setting of recent surgery and decrease ambulation  plasma osmolality 275  TSH and cortisol level WNL  f/u BMP      #Urinary retention  probably 2/2 to pain and decrease ambulation  d/c bonilla and TOV Today    #HTN  c/w valsartan    #hypothyroidism   c/w levothyroxine    #Asthma   c/w inhaler     #arthritis   c/w tramadol    #Others  DASH diet  DVT and GI PPX  from home  Full code

## 2018-05-30 NOTE — DISCHARGE NOTE ADULT - PATIENT PORTAL LINK FT
You can access the [facility] Patient Portal, offered by [facility], by registering with the following website: [email or web address]

## 2018-05-30 NOTE — DISCHARGE NOTE ADULT - CARE PROVIDER_API CALL
Sloane Gotti), Neurological Surgery  [address]  [place] [address]  Phone: [phone]  Fax: [phone]    Darian Vasquez), Neurology  [facility]  [address]  [place] [address]  Phone: [phone]  Fax: [phone]

## 2018-05-30 NOTE — DISCHARGE NOTE ADULT - CARE PLAN
Principal Discharge DX:	Disc displacement, lumbar  Goal:	To treat the disease and prevent complications Principal Discharge DX:	Disc displacement, lumbar  Goal:	To treat the disease and prevent complications  Secondary Diagnosis:	Urinary retention  Goal:	To treat the disease and prevent complications Principal Discharge DX:	Disc displacement, lumbar  Goal:	To treat the disease and prevent complications  Assessment and plan of treatment:	Patient was admitted with right leg pain, tingling , numbness and inability to ambulate. Patient arterial and venous duplex were normal. Neurology was consulted who recommended MRI of the lumber spine which showed the followin.  Severe spinal canal stenosis and moderate to severe bilateral   neuroforaminal narrowing at L3-4 secondary to moderate to large diffuse   disc bulge with superimposed right neural foraminal disc protrusion and   severe bilateral facet and ligamentous hypertrophic change.  2.  Multilevel diffuse disc bulges L1-2, L2-3, L4-5 and L5-S1 resulting   in flattening of the ventral thecal sac at L1-2, L2-3, L4-5 and mild to   moderate bilateral neuroforaminal narrowing at L4-5 and L5-S1.  3.  Disc desiccation throughout the lumbar spine worse at L3-4 and L4-5.  4.  Fatty infiltration paraspinal muscles throughout the lumbar spine   worse at L4-5 and L5-S1.  5.  No abnormal enhancing masses or abscess.    Neurosurgery was consult, the recommended conservative management with outpatient follow up, as the MRI showed chronic disease and not something acute.  Neurology recommend prednisone taper, pain control and outpatient follow up.  patient ambulated with the physical therapy team and her pain was much controlled  Secondary Diagnosis:	Urinary retention  Goal:	To treat the disease and prevent complications  Assessment and plan of treatment:	patient had urinary retention on admission , Dia catheter was placed then removed after 2 days.  Patient voided and had no problems after  Secondary Diagnosis:	Hyponatremia  Goal:	To treat the disease and prevent complications  Assessment and plan of treatment:	resolved after fluid administration

## 2018-05-30 NOTE — PROGRESS NOTE ADULT - SUBJECTIVE AND OBJECTIVE BOX
SUBJECTIVE:     Patient is a 72y old Female who presents with a chief complaint of worsening right leg pain for 1 day (28 May 2018 11:16)    Currently admitted to medicine with the primary diagnosis of Leg pain     Today is hospital day 2d. This morning she is resting comfortably in bed and reports no new issues or overnight events.     PAST MEDICAL & SURGICAL HISTORY  Osteoarthritis  Blood clot in vein  Hypothyroid  HTN (hypertension)  H/O hernia repair: friday, may 25 2018    SOCIAL HISTORY:  Negative for smoking/alcohol/drug use.     ALLERGIES:  penicillin (Hives)    MEDICATIONS:  STANDING MEDICATIONS  buDESOnide 160 MICROgram(s)/formoterol 4.5 MICROgram(s) Inhaler 2 Puff(s) Inhalation two times a day  clonazePAM Tablet 1.5 milliGRAM(s) Oral daily  dexamethasone  Injectable 4 milliGRAM(s) IV Push every 6 hours  docusate sodium 100 milliGRAM(s) Oral two times a day  enoxaparin Injectable 40 milliGRAM(s) SubCutaneous every 24 hours  levothyroxine 200 MICROGram(s) Oral daily  montelukast 10 milliGRAM(s) Oral daily  pantoprazole    Tablet 40 milliGRAM(s) Oral before breakfast  polyethylene glycol 3350 17 Gram(s) Oral at bedtime  valsartan 160 milliGRAM(s) Oral daily    PRN MEDICATIONS  bisacodyl Suppository 10 milliGRAM(s) Rectal daily PRN  traMADol 50 milliGRAM(s) Oral every 4 hours PRN    VITALS:   T(F): 96.9  HR: 55  BP: 124/65  RR: 18  SpO2: --    PHYSICAL EXAM:  GEN: No acute distress  LUNGS: Clear to auscultation bilaterally   HEART: S1/S2 present. RRR.   ABD: Soft, non-tender, non-distended. Bowel sounds present  EXT: NC/NC/+1 pitting pedal edmea b/l/2+PP/MARTINEZ  NEURO: AAOX3        LABS:                        12.8   6.98  )-----------( 277      ( 29 May 2018 06:22 )             36.6     05-29    133<L>  |  97<L>  |  9<L>  ----------------------------<  157<H>  4.8   |  22  |  0.6<L>    Ca    8.4<L>      29 May 2018 06:22  Phos  2.6     05-29  Mg     1.9     05-29                  Culture - Urine (collected 28 May 2018 00:10)  Source: .Urine Catheterized  Final Report (29 May 2018 21:56):    No growth    Culture - Blood (collected 27 May 2018 20:09)  Source: .Blood Blood  Preliminary Report (29 May 2018 03:01):    No growth to date.    Culture - Blood (collected 27 May 2018 20:09)  Source: .Blood Blood  Preliminary Report (29 May 2018 03:01):    No growth to date.          RADIOLOGY:      < from: CT Abdomen and Pelvis w/ IV Cont (05.28.18 @ 00:37) >  IMPRESSION:      Findings compatible with recent periumbilical hernia surgery.   Superimposed infectious process is difficult to exclude based on imaging.   No evidence of hernia recurrence.    No evidence of bowel obstruction.    2 small masses in the right breast. Correlation with nonemergent   diagnostic mammogram and sonogram is recommended.      < from: VA Duplex Low Ext Arterial, Ltd, Right (05.28.18 @ 08:47) >    Impression:    No evidence of hemodynamically significant arterial disease in right   lower extremity.    < from: VA Duplex Lower Ext Vein Scan, Bilat (05.28.18 @ 00:25) >  Impression:    No evidence of deep venous thrombosis or superficial thrombophlebitis in   bilateral lower extremities.    Right Baker's cyst.      < from: Xray Chest 1 View AP/PA (05.27.18 @ 20:45) >  Impression:      No radiographic evidence of acute cardiopulmonary disease.      < from: MR Lumbar Spine w/wo IV Cont (05.28.18 @ 20:35) >    Impression:     1.  Severe spinal canal stenosis and moderate to severe bilateral   neuroforaminal narrowing at L3-4 secondary to moderate to large diffuse   disc bulge with superimposed right neural foraminal disc protrusion and   severe bilateral facet and ligamentous hypertrophic change.    2.  Multilevel diffuse disc bulges L1-2, L2-3, L4-5 and L5-S1 resulting   in flattening of the ventral thecal sac at L1-2, L2-3, L4-5 and mild to   moderate bilateral neuroforaminal narrowing at L4-5 and L5-S1.    3.  Disc desiccation throughout the lumbar spine worse at L3-4 and L4-5.    4.  Fatty infiltration paraspinal muscles throughout the lumbar spine   worse at L4-5 and L5-S1.    5.  No abnormal enhancing masses or abscess.

## 2018-05-30 NOTE — DISCHARGE NOTE ADULT - PLAN OF CARE
To treat the disease and prevent complications Patient was admitted with right leg pain, tingling , numbness and inability to ambulate. Patient arterial and venous duplex were normal. Neurology was consulted who recommended MRI of the lumber spine which showed the followin.  Severe spinal canal stenosis and moderate to severe bilateral   neuroforaminal narrowing at L3-4 secondary to moderate to large diffuse   disc bulge with superimposed right neural foraminal disc protrusion and   severe bilateral facet and ligamentous hypertrophic change.  2.  Multilevel diffuse disc bulges L1-2, L2-3, L4-5 and L5-S1 resulting   in flattening of the ventral thecal sac at L1-2, L2-3, L4-5 and mild to   moderate bilateral neuroforaminal narrowing at L4-5 and L5-S1.  3.  Disc desiccation throughout the lumbar spine worse at L3-4 and L4-5.  4.  Fatty infiltration paraspinal muscles throughout the lumbar spine   worse at L4-5 and L5-S1.  5.  No abnormal enhancing masses or abscess.    Neurosurgery was consult, the recommended conservative management with outpatient follow up, as the MRI showed chronic disease and not something acute.  Neurology recommend prednisone taper, pain control and outpatient follow up.  patient ambulated with the physical therapy team and her pain was much controlled patient had urinary retention on admission , Dia catheter was placed then removed after 2 days.  Patient voided and had no problems after resolved after fluid administration

## 2018-05-30 NOTE — DISCHARGE NOTE ADULT - HOSPITAL COURSE
73 y/o female with PMHx of HTN, arthritis hypothyroidism and hx of DVT p/w worsening right leg pain for 1 day.  - Patient was admitted with right leg pain, tingling , numbness and inability to ambulate. Patient arterial and venous duplex were normal. Neurology was consulted who recommended MRI of the lumber spine which showed the followin.  Severe spinal canal stenosis and moderate to severe bilateral   neuroforaminal narrowing at L3-4 secondary to moderate to large diffuse   disc bulge with superimposed right neural foraminal disc protrusion and   severe bilateral facet and ligamentous hypertrophic change.  2.  Multilevel diffuse disc bulges L1-2, L2-3, L4-5 and L5-S1 resulting   in flattening of the ventral thecal sac at L1-2, L2-3, L4-5 and mild to   moderate bilateral neuroforaminal narrowing at L4-5 and L5-S1.  3.  Disc desiccation throughout the lumbar spine worse at L3-4 and L4-5.  4.  Fatty infiltration paraspinal muscles throughout the lumbar spine   worse at L4-5 and L5-S1.  5.  No abnormal enhancing masses or abscess.  Neurosurgery was consult, the recommended conservative management with outpatient follow up, as the MRI showed chronic disease and not something acute.  Neurology recommend prednisone taper, pain control and outpatient follow up.  patient ambulated with the physical therapy team and her pain was much controlled  -patient had hyponatremia on admission which resolved with fluid administration   -Patient also had urinary retention on admission which resolved after inserting Dia and then removed   -Patient home care was arranged and she is being discharged

## 2018-05-30 NOTE — PHYSICAL THERAPY INITIAL EVALUATION ADULT - GENERAL OBSERVATIONS, REHAB EVAL
1345 - 1403 Pt rec in b/s chair in NAD with PCA Keturah in room. Pt c/o L flank/L lower back, and b/l knee pain during amb.

## 2018-06-01 DIAGNOSIS — E03.9 HYPOTHYROIDISM, UNSPECIFIED: ICD-10-CM

## 2018-06-01 DIAGNOSIS — R26.2 DIFFICULTY IN WALKING, NOT ELSEWHERE CLASSIFIED: ICD-10-CM

## 2018-06-01 DIAGNOSIS — M54.16 RADICULOPATHY, LUMBAR REGION: ICD-10-CM

## 2018-06-01 DIAGNOSIS — M19.90 UNSPECIFIED OSTEOARTHRITIS, UNSPECIFIED SITE: ICD-10-CM

## 2018-06-01 DIAGNOSIS — R33.9 RETENTION OF URINE, UNSPECIFIED: ICD-10-CM

## 2018-06-01 DIAGNOSIS — E87.1 HYPO-OSMOLALITY AND HYPONATREMIA: ICD-10-CM

## 2018-06-01 DIAGNOSIS — M79.604 PAIN IN RIGHT LEG: ICD-10-CM

## 2018-06-01 DIAGNOSIS — Z98.890 OTHER SPECIFIED POSTPROCEDURAL STATES: ICD-10-CM

## 2018-06-01 DIAGNOSIS — G89.29 OTHER CHRONIC PAIN: ICD-10-CM

## 2018-06-01 DIAGNOSIS — I10 ESSENTIAL (PRIMARY) HYPERTENSION: ICD-10-CM

## 2018-06-01 DIAGNOSIS — J45.909 UNSPECIFIED ASTHMA, UNCOMPLICATED: ICD-10-CM

## 2018-06-01 DIAGNOSIS — E86.0 DEHYDRATION: ICD-10-CM

## 2018-06-01 DIAGNOSIS — M48.061 SPINAL STENOSIS, LUMBAR REGION WITHOUT NEUROGENIC CLAUDICATION: ICD-10-CM

## 2018-06-01 DIAGNOSIS — M51.26 OTHER INTERVERTEBRAL DISC DISPLACEMENT, LUMBAR REGION: ICD-10-CM

## 2018-06-01 DIAGNOSIS — Z88.0 ALLERGY STATUS TO PENICILLIN: ICD-10-CM

## 2018-06-01 DIAGNOSIS — Z86.718 PERSONAL HISTORY OF OTHER VENOUS THROMBOSIS AND EMBOLISM: ICD-10-CM

## 2018-06-02 LAB
CULTURE RESULTS: SIGNIFICANT CHANGE UP
CULTURE RESULTS: SIGNIFICANT CHANGE UP
SPECIMEN SOURCE: SIGNIFICANT CHANGE UP
SPECIMEN SOURCE: SIGNIFICANT CHANGE UP

## 2018-06-05 ENCOUNTER — APPOINTMENT (OUTPATIENT)
Dept: SURGERY | Facility: CLINIC | Age: 73
End: 2018-06-05
Payer: MEDICAID

## 2018-06-05 DIAGNOSIS — K43.6 OTHER AND UNSPECIFIED VENTRAL HERNIA WITH OBSTRUCTION, W/OUT GANGRENE: ICD-10-CM

## 2018-06-05 PROCEDURE — 99024 POSTOP FOLLOW-UP VISIT: CPT

## 2018-07-13 PROBLEM — I10 ESSENTIAL (PRIMARY) HYPERTENSION: Chronic | Status: ACTIVE | Noted: 2018-05-27

## 2018-07-13 PROBLEM — E03.9 HYPOTHYROIDISM, UNSPECIFIED: Chronic | Status: ACTIVE | Noted: 2018-05-27

## 2018-07-17 ENCOUNTER — OUTPATIENT (OUTPATIENT)
Dept: OUTPATIENT SERVICES | Facility: HOSPITAL | Age: 73
LOS: 1 days | Discharge: HOME | End: 2018-07-17

## 2018-07-17 VITALS
OXYGEN SATURATION: 96 % | TEMPERATURE: 97 F | SYSTOLIC BLOOD PRESSURE: 150 MMHG | DIASTOLIC BLOOD PRESSURE: 71 MMHG | HEART RATE: 71 BPM | HEIGHT: 63 IN | WEIGHT: 217.82 LBS | RESPIRATION RATE: 20 BRPM

## 2018-07-17 DIAGNOSIS — Z98.51 TUBAL LIGATION STATUS: Chronic | ICD-10-CM

## 2018-07-17 DIAGNOSIS — Z01.818 ENCOUNTER FOR OTHER PREPROCEDURAL EXAMINATION: ICD-10-CM

## 2018-07-17 DIAGNOSIS — Q74.2 OTHER CONGENITAL MALFORMATIONS OF LOWER LIMB(S), INCLUDING PELVIC GIRDLE: ICD-10-CM

## 2018-07-17 DIAGNOSIS — M20.42 OTHER HAMMER TOE(S) (ACQUIRED), LEFT FOOT: ICD-10-CM

## 2018-07-17 DIAGNOSIS — Z90.49 ACQUIRED ABSENCE OF OTHER SPECIFIED PARTS OF DIGESTIVE TRACT: Chronic | ICD-10-CM

## 2018-07-17 DIAGNOSIS — Z98.890 OTHER SPECIFIED POSTPROCEDURAL STATES: Chronic | ICD-10-CM

## 2018-07-17 DIAGNOSIS — Z90.89 ACQUIRED ABSENCE OF OTHER ORGANS: Chronic | ICD-10-CM

## 2018-07-17 RX ORDER — MOMETASONE FUROATE AND FORMOTEROL FUMARATE DIHYDRATE 200; 5 UG/1; UG/1
2 AEROSOL RESPIRATORY (INHALATION)
Qty: 0 | Refills: 0 | COMMUNITY

## 2018-07-17 RX ORDER — MONTELUKAST 4 MG/1
1 TABLET, CHEWABLE ORAL
Qty: 0 | Refills: 0 | COMMUNITY

## 2018-07-17 NOTE — H&P PST ADULT - PMH
Arthritis    Blood clot in vein  5 y ago with treatment  Depression    HTN (hypertension)    Hypothyroid    Osteoarthritis Arthritis  OA  Blood clot in vein  5 y ago with treatment  Depression  no suicide ideation  HTN (hypertension)    Hypothyroid    Osteoarthritis Arthritis  OA  Asthma  no recent attacks  Blood clot in vein  5 y ago with treatment  Depression  no suicide ideation  HTN (hypertension)    Hypothyroid    Osteoarthritis

## 2018-07-17 NOTE — H&P PST ADULT - PSH
H/O hernia repair  friday, may 25 2018  H/O tubal ligation    History of cholecystectomy    S/P tonsillectomy H/O hernia repair  may 25 2018  H/O tubal ligation    History of cholecystectomy    S/P tonsillectomy

## 2018-07-17 NOTE — H&P PST ADULT - REASON FOR ADMISSION
pt for left foot surgery--"issues on bones and malformation of second and third  pt denies current cp,sob,palp,cough,dysuria   can walk 2 fos and several blocks without sob  pt states this is complete med/surg history pt for left foot surgery--"issues on bones and malformation of second and third  pt denies current cp,sob,palp,cough,dysuria   unable to assess ex mariola pt ambulates with cane and is limited by pain  pt states this is complete med/surg history pt for left foot surgery--"issues on bones and malformation of second and third toe" wheras great toe is number one pt denies current cp,sob,palp,cough,dysuria   unable to assess ex mariola pt ambulates with cane and is limited by pain  pt states this is complete med/surg history pt for left foot surgery--"issues on bones and malformation of second and third toe" whereas great toe is number one pt denies current cp,sob,palp,cough,dysuria   unable to assess ex mariola pt ambulates with cane and is limited by pain  pt states this is complete med/surg history

## 2018-07-18 DIAGNOSIS — F32.9 MAJOR DEPRESSIVE DISORDER, SINGLE EPISODE, UNSPECIFIED: ICD-10-CM

## 2018-07-18 DIAGNOSIS — J45.909 UNSPECIFIED ASTHMA, UNCOMPLICATED: ICD-10-CM

## 2018-07-18 LAB
ANION GAP SERPL CALC-SCNC: 17 MMOL/L — HIGH (ref 7–14)
APPEARANCE UR: CLEAR — SIGNIFICANT CHANGE UP
BASOPHILS # BLD AUTO: 0.02 K/UL — SIGNIFICANT CHANGE UP (ref 0–0.2)
BASOPHILS NFR BLD AUTO: 0.4 % — SIGNIFICANT CHANGE UP (ref 0–1)
BILIRUB UR-MCNC: NEGATIVE — SIGNIFICANT CHANGE UP
BUN SERPL-MCNC: 13 MG/DL — SIGNIFICANT CHANGE UP (ref 10–20)
CALCIUM SERPL-MCNC: 10 MG/DL — SIGNIFICANT CHANGE UP (ref 8.5–10.1)
CHLORIDE SERPL-SCNC: 95 MMOL/L — LOW (ref 98–110)
CO2 SERPL-SCNC: 27 MMOL/L — SIGNIFICANT CHANGE UP (ref 17–32)
COLOR SPEC: YELLOW — SIGNIFICANT CHANGE UP
CREAT SERPL-MCNC: 0.9 MG/DL — SIGNIFICANT CHANGE UP (ref 0.7–1.5)
DIFF PNL FLD: NEGATIVE — SIGNIFICANT CHANGE UP
EOSINOPHIL # BLD AUTO: 0.13 K/UL — SIGNIFICANT CHANGE UP (ref 0–0.7)
EOSINOPHIL NFR BLD AUTO: 2.3 % — SIGNIFICANT CHANGE UP (ref 0–8)
EPI CELLS # UR: ABNORMAL /HPF
GLUCOSE SERPL-MCNC: 52 MG/DL — LOW (ref 70–99)
GLUCOSE UR QL: NEGATIVE — SIGNIFICANT CHANGE UP
HCT VFR BLD CALC: 42.4 % — SIGNIFICANT CHANGE UP (ref 37–47)
HGB BLD-MCNC: 14.1 G/DL — SIGNIFICANT CHANGE UP (ref 12–16)
IMM GRANULOCYTES NFR BLD AUTO: 0.4 % — HIGH (ref 0.1–0.3)
KETONES UR-MCNC: NEGATIVE — SIGNIFICANT CHANGE UP
LEUKOCYTE ESTERASE UR-ACNC: ABNORMAL
LYMPHOCYTES # BLD AUTO: 1.75 K/UL — SIGNIFICANT CHANGE UP (ref 1.2–3.4)
LYMPHOCYTES # BLD AUTO: 31.3 % — SIGNIFICANT CHANGE UP (ref 20.5–51.1)
MCHC RBC-ENTMCNC: 30.1 PG — SIGNIFICANT CHANGE UP (ref 27–31)
MCHC RBC-ENTMCNC: 33.3 G/DL — SIGNIFICANT CHANGE UP (ref 32–37)
MCV RBC AUTO: 90.6 FL — SIGNIFICANT CHANGE UP (ref 81–99)
MONOCYTES # BLD AUTO: 0.56 K/UL — SIGNIFICANT CHANGE UP (ref 0.1–0.6)
MONOCYTES NFR BLD AUTO: 10 % — HIGH (ref 1.7–9.3)
NEUTROPHILS # BLD AUTO: 3.12 K/UL — SIGNIFICANT CHANGE UP (ref 1.4–6.5)
NEUTROPHILS NFR BLD AUTO: 55.6 % — SIGNIFICANT CHANGE UP (ref 42.2–75.2)
NITRITE UR-MCNC: NEGATIVE — SIGNIFICANT CHANGE UP
NRBC # BLD: 0 /100 WBCS — SIGNIFICANT CHANGE UP (ref 0–0)
PH UR: 6.5 — SIGNIFICANT CHANGE UP (ref 5–8)
PLATELET # BLD AUTO: 249 K/UL — SIGNIFICANT CHANGE UP (ref 130–400)
POTASSIUM SERPL-MCNC: 4.3 MMOL/L — SIGNIFICANT CHANGE UP (ref 3.5–5)
POTASSIUM SERPL-SCNC: 4.3 MMOL/L — SIGNIFICANT CHANGE UP (ref 3.5–5)
PROT UR-MCNC: ABNORMAL
RBC # BLD: 4.68 M/UL — SIGNIFICANT CHANGE UP (ref 4.2–5.4)
RBC # FLD: 13.8 % — SIGNIFICANT CHANGE UP (ref 11.5–14.5)
SODIUM SERPL-SCNC: 139 MMOL/L — SIGNIFICANT CHANGE UP (ref 135–146)
SP GR SPEC: 1.01 — SIGNIFICANT CHANGE UP (ref 1.01–1.03)
UROBILINOGEN FLD QL: 0.2 — SIGNIFICANT CHANGE UP (ref 0.2–0.2)
WBC # BLD: 5.6 K/UL — SIGNIFICANT CHANGE UP (ref 4.8–10.8)
WBC # FLD AUTO: 5.6 K/UL — SIGNIFICANT CHANGE UP (ref 4.8–10.8)
WBC UR QL: ABNORMAL /HPF

## 2018-07-19 PROBLEM — E03.9 HYPOTHYROIDISM, UNSPECIFIED: Chronic | Status: INACTIVE | Noted: 2018-05-11 | Resolved: 2018-07-17

## 2018-07-19 PROBLEM — M19.90 UNSPECIFIED OSTEOARTHRITIS, UNSPECIFIED SITE: Chronic | Status: ACTIVE | Noted: 2018-05-11

## 2018-07-19 PROBLEM — F32.9 MAJOR DEPRESSIVE DISORDER, SINGLE EPISODE, UNSPECIFIED: Chronic | Status: ACTIVE | Noted: 2018-05-11

## 2018-07-19 PROBLEM — I10 ESSENTIAL (PRIMARY) HYPERTENSION: Chronic | Status: INACTIVE | Noted: 2018-05-11 | Resolved: 2018-07-17

## 2018-07-24 PROBLEM — M19.90 UNSPECIFIED OSTEOARTHRITIS, UNSPECIFIED SITE: Chronic | Status: ACTIVE | Noted: 2018-05-28

## 2018-07-25 NOTE — ASU PATIENT PROFILE, ADULT - PMH
Arthritis  OA  Asthma  no recent attacks  Blood clot in vein  5 y ago with treatment  Depression  no suicide ideation  HTN (hypertension)    Hypothyroid    Osteoarthritis

## 2018-07-25 NOTE — ASU PATIENT PROFILE, ADULT - ANESTHESIA, PREVIOUS REACTION, PROFILE
unknown/PATIENT STATES SHE HAD ANESTESIA IN HER COUNTRY WITH AN UNKOWN REACTION, BUT HAS HAD ANESTESIA SINCE THEN WITH NO COMPLICATIONS

## 2018-07-25 NOTE — ASU PATIENT PROFILE, ADULT - PSH
H/O hernia repair  may 25 2018  H/O tubal ligation    History of cholecystectomy    S/P tonsillectomy

## 2018-07-25 NOTE — ASU PATIENT PROFILE, ADULT - SITE
first metatarsal osteotomy internal fixation reconstruction of second and third toe, bone ressection left foot

## 2018-07-26 ENCOUNTER — OUTPATIENT (OUTPATIENT)
Dept: OUTPATIENT SERVICES | Facility: HOSPITAL | Age: 73
LOS: 1 days | Discharge: HOME | End: 2018-07-26

## 2018-07-26 ENCOUNTER — RESULT REVIEW (OUTPATIENT)
Age: 73
End: 2018-07-26

## 2018-07-26 VITALS
SYSTOLIC BLOOD PRESSURE: 131 MMHG | RESPIRATION RATE: 16 BRPM | HEART RATE: 80 BPM | HEIGHT: 66 IN | TEMPERATURE: 98 F | DIASTOLIC BLOOD PRESSURE: 70 MMHG | WEIGHT: 210.1 LBS | OXYGEN SATURATION: 100 %

## 2018-07-26 VITALS — RESPIRATION RATE: 17 BRPM | DIASTOLIC BLOOD PRESSURE: 77 MMHG | SYSTOLIC BLOOD PRESSURE: 149 MMHG | HEART RATE: 71 BPM

## 2018-07-26 DIAGNOSIS — Z98.51 TUBAL LIGATION STATUS: Chronic | ICD-10-CM

## 2018-07-26 DIAGNOSIS — Z90.49 ACQUIRED ABSENCE OF OTHER SPECIFIED PARTS OF DIGESTIVE TRACT: Chronic | ICD-10-CM

## 2018-07-26 DIAGNOSIS — Z90.89 ACQUIRED ABSENCE OF OTHER ORGANS: Chronic | ICD-10-CM

## 2018-07-26 DIAGNOSIS — Z98.890 OTHER SPECIFIED POSTPROCEDURAL STATES: Chronic | ICD-10-CM

## 2018-07-26 RX ORDER — OXYCODONE AND ACETAMINOPHEN 5; 325 MG/1; MG/1
1 TABLET ORAL EVERY 4 HOURS
Qty: 0 | Refills: 0 | Status: DISCONTINUED | OUTPATIENT
Start: 2018-07-26 | End: 2018-07-26

## 2018-07-26 RX ORDER — SODIUM CHLORIDE 9 MG/ML
1000 INJECTION, SOLUTION INTRAVENOUS
Qty: 0 | Refills: 0 | Status: DISCONTINUED | OUTPATIENT
Start: 2018-07-26 | End: 2018-08-10

## 2018-07-26 RX ORDER — MORPHINE SULFATE 50 MG/1
2 CAPSULE, EXTENDED RELEASE ORAL
Qty: 0 | Refills: 0 | Status: DISCONTINUED | OUTPATIENT
Start: 2018-07-26 | End: 2018-07-26

## 2018-07-26 RX ORDER — ONDANSETRON 8 MG/1
4 TABLET, FILM COATED ORAL ONCE
Qty: 0 | Refills: 0 | Status: DISCONTINUED | OUTPATIENT
Start: 2018-07-26 | End: 2018-08-10

## 2018-07-26 RX ORDER — MORPHINE SULFATE 50 MG/1
1 CAPSULE, EXTENDED RELEASE ORAL
Qty: 0 | Refills: 0 | Status: DISCONTINUED | OUTPATIENT
Start: 2018-07-26 | End: 2018-07-26

## 2018-07-26 RX ADMIN — SODIUM CHLORIDE 75 MILLILITER(S): 9 INJECTION, SOLUTION INTRAVENOUS at 12:27

## 2018-07-26 RX ADMIN — MORPHINE SULFATE 2 MILLIGRAM(S): 50 CAPSULE, EXTENDED RELEASE ORAL at 12:28

## 2018-07-26 NOTE — PROGRESS NOTE ADULT - SUBJECTIVE AND OBJECTIVE BOX
Podiatry Pre-Operative Note   [id number] ALDO DYKES is a 72y year old Female patient presenting to the operating room for surgical management of the Left foot. The patient has failed all conservative measures and requires surgical intervention at this time.    PAST MEDICAL & SURGICAL HISTORY:  Asthma: no recent attacks  Osteoarthritis  Blood clot in vein: 5 y ago with treatment  Hypothyroid  HTN (hypertension)  Depression: no suicide ideation  Arthritis: OA  H/O hernia repair: may 25 2018  History of cholecystectomy  H/O tubal ligation  S/P tonsillectomy    Medications:   clonazePAM: 1.5 milligram(s) orally once a day  levothyroxine 200 mcg (0.2 mg) oral tablet: 1 tab(s) orally once a day  valsartan 160 mg oral capsule: orally once a day    Allergies  penicillin (Hives)  penicillins (Rash; Flushing; Hives)    Consent [Done]  H&P [PAST]  Medical Clearance [PAST]    Surgeon: Dr. Nghia Roy DPM  Assistant (s): Dr. Myles Pinedo, Dr. Taran Mahajan, Lovely Hendrix MS-4  Pre Operative Diagnosis: Left foot painful first, second and third toe macrodactyl   Planned Procedure: 1st metatarsal with internal fixation and reconstruction of toe 2 and 3 macrodactyl with bone resection Left foot     The patient has been educated on the above procedure, with all risks and benefits described. No guarantees were given or implied for the aforementioned procedure.  The above assistant(s) have introduced themselves to the patient. The patient consents to their participation in the procedure listed above.   07-26-18 @ 08:40

## 2018-07-26 NOTE — ASU DISCHARGE PLAN (ADULT/PEDIATRIC). - NOTIFY
Swelling that continues/Numbness, color, or temperature change to extremity/Inability to Tolerate Liquids or Foods/Excessive Diarrhea/Fever greater than 101/Bleeding that does not stop/Persistent Nausea and Vomiting/Unable to Urinate/Numbness, tingling/Increased Irritability or Sluggishness/Pain not relieved by Medications

## 2018-07-26 NOTE — PRE-ANESTHESIA EVALUATION ADULT - NSANTHOSAYNRD_GEN_A_CORE
No. MAGALIE screening performed.  STOP BANG Legend: 0-2 = LOW Risk; 3-4 = INTERMEDIATE Risk; 5-8 = HIGH Risk

## 2018-07-26 NOTE — ASU DISCHARGE PLAN (ADULT/PEDIATRIC). - ACTIVITY LEVEL
no exercise/elevate extremity/weight bearing as tolerated/no tub baths/no heavy lifting/no sports/gym

## 2018-07-26 NOTE — PRE-ANESTHESIA EVALUATION ADULT - ANESTHESIA, PREVIOUS REACTION, PROFILE
none/unknown/PATIENT STATES SHE HAD ANESTESIA IN HER COUNTRY WITH AN UNKOWN REACTION, BUT HAS HAD ANESTESIA SINCE THEN WITH NO COMPLICATIONS

## 2018-07-26 NOTE — BRIEF OPERATIVE NOTE - PRE-OP DX
Bunion of great toe of left foot  07/26/2018    Active  Myles Pinedo  Hammer toe of left foot  07/26/2018  2nd toe  Active  Myles Pinedo  Hammertoe of left foot  07/26/2018  3rd toe  Active  Myles Pinedo

## 2018-07-26 NOTE — BRIEF OPERATIVE NOTE - POST-OP DX
Bunion of great toe of left foot  07/26/2018    Active  Myles Pinedo of left foot  07/26/2018  2nd toe  Active  Myles Pinedo of left foot  07/26/2018  3rd toe  Active  Myles Pinedo

## 2018-07-26 NOTE — BRIEF OPERATIVE NOTE - PROCEDURE
<<-----Click on this checkbox to enter Procedure Hammertoe repair  07/26/2018  left 3rd  Active  [id number]  Hammertoe repair  07/26/2018  left 2nd  Active  ANKIT  Bunion correction  07/26/2018  left hallux  Active  ANKIT

## 2018-07-26 NOTE — ASU DISCHARGE PLAN (ADULT/PEDIATRIC). - SPECIAL INSTRUCTIONS
Please keep dressing clean dry and intact.   Elevate left lower extremity.   Ambulate in DARCO shoe.

## 2018-07-31 DIAGNOSIS — M20.12 HALLUX VALGUS (ACQUIRED), LEFT FOOT: ICD-10-CM

## 2018-07-31 DIAGNOSIS — F32.9 MAJOR DEPRESSIVE DISORDER, SINGLE EPISODE, UNSPECIFIED: ICD-10-CM

## 2018-07-31 DIAGNOSIS — I10 ESSENTIAL (PRIMARY) HYPERTENSION: ICD-10-CM

## 2018-07-31 DIAGNOSIS — M20.5X2 OTHER DEFORMITIES OF TOE(S) (ACQUIRED), LEFT FOOT: ICD-10-CM

## 2018-07-31 DIAGNOSIS — E03.9 HYPOTHYROIDISM, UNSPECIFIED: ICD-10-CM

## 2018-07-31 DIAGNOSIS — Z98.51 TUBAL LIGATION STATUS: ICD-10-CM

## 2018-07-31 DIAGNOSIS — Z88.0 ALLERGY STATUS TO PENICILLIN: ICD-10-CM

## 2018-07-31 DIAGNOSIS — Z90.49 ACQUIRED ABSENCE OF OTHER SPECIFIED PARTS OF DIGESTIVE TRACT: ICD-10-CM

## 2018-07-31 DIAGNOSIS — M19.90 UNSPECIFIED OSTEOARTHRITIS, UNSPECIFIED SITE: ICD-10-CM

## 2018-07-31 DIAGNOSIS — J45.909 UNSPECIFIED ASTHMA, UNCOMPLICATED: ICD-10-CM

## 2018-07-31 DIAGNOSIS — M20.42 OTHER HAMMER TOE(S) (ACQUIRED), LEFT FOOT: ICD-10-CM

## 2018-07-31 LAB — SURGICAL PATHOLOGY STUDY: SIGNIFICANT CHANGE UP

## 2018-08-12 ENCOUNTER — EMERGENCY (EMERGENCY)
Facility: HOSPITAL | Age: 73
LOS: 0 days | Discharge: HOME | End: 2018-08-12
Attending: EMERGENCY MEDICINE | Admitting: EMERGENCY MEDICINE

## 2018-08-12 VITALS
SYSTOLIC BLOOD PRESSURE: 156 MMHG | DIASTOLIC BLOOD PRESSURE: 87 MMHG | RESPIRATION RATE: 18 BRPM | HEART RATE: 88 BPM | OXYGEN SATURATION: 96 % | TEMPERATURE: 98 F

## 2018-08-12 VITALS
SYSTOLIC BLOOD PRESSURE: 171 MMHG | RESPIRATION RATE: 18 BRPM | TEMPERATURE: 99 F | OXYGEN SATURATION: 96 % | WEIGHT: 198.42 LBS | HEART RATE: 79 BPM | DIASTOLIC BLOOD PRESSURE: 92 MMHG

## 2018-08-12 DIAGNOSIS — K62.89 OTHER SPECIFIED DISEASES OF ANUS AND RECTUM: ICD-10-CM

## 2018-08-12 DIAGNOSIS — Z98.51 TUBAL LIGATION STATUS: Chronic | ICD-10-CM

## 2018-08-12 DIAGNOSIS — Z87.39 PERSONAL HISTORY OF OTHER DISEASES OF THE MUSCULOSKELETAL SYSTEM AND CONNECTIVE TISSUE: ICD-10-CM

## 2018-08-12 DIAGNOSIS — Z90.89 ACQUIRED ABSENCE OF OTHER ORGANS: Chronic | ICD-10-CM

## 2018-08-12 DIAGNOSIS — Z98.51 TUBAL LIGATION STATUS: ICD-10-CM

## 2018-08-12 DIAGNOSIS — Z90.49 ACQUIRED ABSENCE OF OTHER SPECIFIED PARTS OF DIGESTIVE TRACT: ICD-10-CM

## 2018-08-12 DIAGNOSIS — Z98.890 OTHER SPECIFIED POSTPROCEDURAL STATES: Chronic | ICD-10-CM

## 2018-08-12 DIAGNOSIS — R14.0 ABDOMINAL DISTENSION (GASEOUS): ICD-10-CM

## 2018-08-12 DIAGNOSIS — Z79.899 OTHER LONG TERM (CURRENT) DRUG THERAPY: ICD-10-CM

## 2018-08-12 DIAGNOSIS — Z90.49 ACQUIRED ABSENCE OF OTHER SPECIFIED PARTS OF DIGESTIVE TRACT: Chronic | ICD-10-CM

## 2018-08-12 DIAGNOSIS — Z90.89 ACQUIRED ABSENCE OF OTHER ORGANS: ICD-10-CM

## 2018-08-12 DIAGNOSIS — R10.9 UNSPECIFIED ABDOMINAL PAIN: ICD-10-CM

## 2018-08-12 DIAGNOSIS — Z88.0 ALLERGY STATUS TO PENICILLIN: ICD-10-CM

## 2018-08-12 DIAGNOSIS — K59.00 CONSTIPATION, UNSPECIFIED: ICD-10-CM

## 2018-08-12 DIAGNOSIS — J45.909 UNSPECIFIED ASTHMA, UNCOMPLICATED: ICD-10-CM

## 2018-08-12 DIAGNOSIS — I10 ESSENTIAL (PRIMARY) HYPERTENSION: ICD-10-CM

## 2018-08-12 DIAGNOSIS — R10.84 GENERALIZED ABDOMINAL PAIN: ICD-10-CM

## 2018-08-12 LAB
ALBUMIN SERPL ELPH-MCNC: 4.7 G/DL — SIGNIFICANT CHANGE UP (ref 3.5–5.2)
ALP SERPL-CCNC: 74 U/L — SIGNIFICANT CHANGE UP (ref 30–115)
ALT FLD-CCNC: 14 U/L — SIGNIFICANT CHANGE UP (ref 0–41)
ANION GAP SERPL CALC-SCNC: 16 MMOL/L — HIGH (ref 7–14)
AST SERPL-CCNC: 25 U/L — SIGNIFICANT CHANGE UP (ref 0–41)
BASOPHILS # BLD AUTO: 0.02 K/UL — SIGNIFICANT CHANGE UP (ref 0–0.2)
BASOPHILS NFR BLD AUTO: 0.2 % — SIGNIFICANT CHANGE UP (ref 0–1)
BILIRUB SERPL-MCNC: 0.4 MG/DL — SIGNIFICANT CHANGE UP (ref 0.2–1.2)
BUN SERPL-MCNC: 9 MG/DL — LOW (ref 10–20)
CALCIUM SERPL-MCNC: 9.7 MG/DL — SIGNIFICANT CHANGE UP (ref 8.5–10.1)
CHLORIDE SERPL-SCNC: 94 MMOL/L — LOW (ref 98–110)
CO2 SERPL-SCNC: 25 MMOL/L — SIGNIFICANT CHANGE UP (ref 17–32)
CREAT SERPL-MCNC: 0.9 MG/DL — SIGNIFICANT CHANGE UP (ref 0.7–1.5)
EOSINOPHIL # BLD AUTO: 0.03 K/UL — SIGNIFICANT CHANGE UP (ref 0–0.7)
EOSINOPHIL NFR BLD AUTO: 0.2 % — SIGNIFICANT CHANGE UP (ref 0–8)
GLUCOSE SERPL-MCNC: 100 MG/DL — HIGH (ref 70–99)
HCT VFR BLD CALC: 41 % — SIGNIFICANT CHANGE UP (ref 37–47)
HGB BLD-MCNC: 14.5 G/DL — SIGNIFICANT CHANGE UP (ref 12–16)
IMM GRANULOCYTES NFR BLD AUTO: 0.6 % — HIGH (ref 0.1–0.3)
LACTATE SERPL-SCNC: 1 MMOL/L — SIGNIFICANT CHANGE UP (ref 0.5–2.2)
LIDOCAIN IGE QN: 21 U/L — SIGNIFICANT CHANGE UP (ref 7–60)
LYMPHOCYTES # BLD AUTO: 1.36 K/UL — SIGNIFICANT CHANGE UP (ref 1.2–3.4)
LYMPHOCYTES # BLD AUTO: 11 % — LOW (ref 20.5–51.1)
MCHC RBC-ENTMCNC: 30.7 PG — SIGNIFICANT CHANGE UP (ref 27–31)
MCHC RBC-ENTMCNC: 35.4 G/DL — SIGNIFICANT CHANGE UP (ref 32–37)
MCV RBC AUTO: 86.9 FL — SIGNIFICANT CHANGE UP (ref 81–99)
MONOCYTES # BLD AUTO: 0.53 K/UL — SIGNIFICANT CHANGE UP (ref 0.1–0.6)
MONOCYTES NFR BLD AUTO: 4.3 % — SIGNIFICANT CHANGE UP (ref 1.7–9.3)
NEUTROPHILS # BLD AUTO: 10.33 K/UL — HIGH (ref 1.4–6.5)
NEUTROPHILS NFR BLD AUTO: 83.7 % — HIGH (ref 42.2–75.2)
NRBC # BLD: 0 /100 WBCS — SIGNIFICANT CHANGE UP (ref 0–0)
PLATELET # BLD AUTO: 334 K/UL — SIGNIFICANT CHANGE UP (ref 130–400)
POTASSIUM SERPL-MCNC: 3.9 MMOL/L — SIGNIFICANT CHANGE UP (ref 3.5–5)
POTASSIUM SERPL-SCNC: 3.9 MMOL/L — SIGNIFICANT CHANGE UP (ref 3.5–5)
PROT SERPL-MCNC: 7.5 G/DL — SIGNIFICANT CHANGE UP (ref 6–8)
RBC # BLD: 4.72 M/UL — SIGNIFICANT CHANGE UP (ref 4.2–5.4)
RBC # FLD: 12.6 % — SIGNIFICANT CHANGE UP (ref 11.5–14.5)
SODIUM SERPL-SCNC: 135 MMOL/L — SIGNIFICANT CHANGE UP (ref 135–146)
WBC # BLD: 12.34 K/UL — HIGH (ref 4.8–10.8)
WBC # FLD AUTO: 12.34 K/UL — HIGH (ref 4.8–10.8)

## 2018-08-12 RX ADMIN — Medication 1 ENEMA: at 18:08

## 2018-08-12 NOTE — ED ADULT NURSE NOTE - NSIMPLEMENTINTERV_GEN_ALL_ED
Implemented All Universal Safety Interventions:  [place] to call system. Call bell, personal items and telephone within reach. Instruct patient to call for assistance. Room bathroom lighting operational. Non-slip footwear when patient is off stretcher. Physically safe environment: no spills, clutter or unnecessary equipment. Stretcher in lowest position, wheels locked, appropriate side rails in place.

## 2018-08-12 NOTE — ED PROVIDER NOTE - PROGRESS NOTE DETAILS
pt still feels like cannot urinate. already on cipro 500 for foot infection. she will follow with PMD in 2 days for urine check. disimpacted large ball of stool. pt also defecated multiple times in ED. will d/c with GI follow up. Counseled on red flags and to return for them. Counseled on importance of follow up. Patient repeats back instructions. Patient advised that they or their doctor may call [phone] to follow up on the specific results of the tests performed today in the emergency department.   Patient appears well on discharge. disimpacted large ball of stool. no blood. pt also defecated multiple times in ED after enema. will d/c with GI follow up. Counseled on red flags and to return for them. Counseled on importance of follow up. Patient repeats back instructions. Patient advised that they or their doctor may call [phone] to follow up on the specific results of the tests performed today in the emergency department.   Patient appears well on discharge.

## 2018-08-12 NOTE — ED ADULT NURSE NOTE - PMH
Arthritis  OA  Asthma  no recent attacks  Blood clot in vein  5 y ago with treatment  Chronic obstructive pulmonary disease, unspecified COPD type    Depression  no suicide ideation  HTN (hypertension)    Hypothyroid    Osteoarthritis

## 2018-08-12 NOTE — ED PROVIDER NOTE - ATTENDING CONTRIBUTION TO CARE
72y female h/o cholecystectomy with diffuse abd pain, mostly lwoer, with no BM x 2d, no fever, no urinary symptoms, on exam vital signs appreciated, nontoxic, abd +bs, soft with lower abd ttp no g/r, rectal with impaction, will cehck labs, imaging, disimpaction/enema, reassess

## 2018-08-12 NOTE — ED PROVIDER NOTE - NS ED ROS FT
Review of Systems    Constitutional: (-) fever  Cardiovascular: (-) chest pain, (-) syncope  Respiratory: (-) cough, (-) shortness of breath  Gastrointestinal: (-) vomiting, (-) diarrhea  Musculoskeletal: (-) neck pain, (-) back pain  Integumentary: (-) rash, (-) edema

## 2018-08-12 NOTE — ED PROVIDER NOTE - PHYSICAL EXAMINATION
PHYSICAL EXAM:    GENERAL: Alert, appears stated age, well appearing, non-toxic uncomfortable.   SKIN: Warm, pink and dry. MMM.   EYE: Normal lids/conjunctiva  ENT: Normal hearing, patent oropharynx  NECK: +supple. No meningismus  Pulm: Bilateral BS, normal resp effort, no wheezes, stridor, or retractions  CV: RRR, no M/R/G, 2+ pulses  Abd: soft, distended +generalized TTP. no hepatosplenomegaly. no CVA tenderness.   Mskel: no erythema, cyanosis, edema  Neuro: AAOx3. normal gait.

## 2018-08-12 NOTE — ED PROVIDER NOTE - OBJECTIVE STATEMENT
[place] speaking only, son vivi bolanos is accepted ad hoc  for entirety of pt encounter: 73 y/o F with PMH hypothyroidism, HTN, DVT not on anticoagulation, asthma, OA presents with abdominal pain and constipation x 2 days. +bloating. recent abx use 4 days ago for URI. +rectal pain. feels like needs to defecate but cannot. last BM 2 days ago. Denies CP, palpitations, SOB, back pain,  n/v/d, black or bloody stools, fevers, sweats, chills, trauma, fall, cough, recent travel, sick contacts, leg pain/swelling, urinary symptoms, rash.

## 2018-08-12 NOTE — ED PROVIDER NOTE - MEDICAL DECISION MAKING DETAILS
72y female h/o cholecystectomy with diffuse abd pain, mostly lwoer, with no BM x 2d, no fever, no urinary symptoms, on exam vital signs appreciated, nontoxic, abd +bs, soft with lower abd ttp no g/r, rectal with impaction, labs and studies reviewed, pt disimpacted and given enema with great success, abd snt, will d/c to f/u with PMD/GI. Patient counseled regarding conditions which should prompt return.

## 2018-08-14 NOTE — ED PROCEDURE NOTE - ATTENDING CONTRIBUTION TO CARE
I was present for and supervised the key and critical aspects of the procedures performed during the care of the patient.
Yes

## 2018-08-21 LAB — SURGICAL PATHOLOGY STUDY: SIGNIFICANT CHANGE UP

## 2018-09-17 ENCOUNTER — INPATIENT (INPATIENT)
Facility: HOSPITAL | Age: 73
LOS: 3 days | Discharge: HOME HEALTH | End: 2018-09-21
Attending: INTERNAL MEDICINE | Admitting: INTERNAL MEDICINE
Payer: MEDICAID

## 2018-09-17 VITALS
RESPIRATION RATE: 18 BRPM | TEMPERATURE: 98 F | OXYGEN SATURATION: 98 % | SYSTOLIC BLOOD PRESSURE: 149 MMHG | HEART RATE: 56 BPM | DIASTOLIC BLOOD PRESSURE: 67 MMHG

## 2018-09-17 DIAGNOSIS — Z98.51 TUBAL LIGATION STATUS: Chronic | ICD-10-CM

## 2018-09-17 DIAGNOSIS — Z90.89 ACQUIRED ABSENCE OF OTHER ORGANS: Chronic | ICD-10-CM

## 2018-09-17 DIAGNOSIS — Z90.49 ACQUIRED ABSENCE OF OTHER SPECIFIED PARTS OF DIGESTIVE TRACT: Chronic | ICD-10-CM

## 2018-09-17 DIAGNOSIS — Z98.890 OTHER SPECIFIED POSTPROCEDURAL STATES: Chronic | ICD-10-CM

## 2018-09-17 LAB
ALBUMIN SERPL ELPH-MCNC: 4.5 G/DL — SIGNIFICANT CHANGE UP (ref 3.5–5.2)
ALP SERPL-CCNC: 91 U/L — SIGNIFICANT CHANGE UP (ref 30–115)
ALT FLD-CCNC: 14 U/L — SIGNIFICANT CHANGE UP (ref 0–41)
ANION GAP SERPL CALC-SCNC: 13 MMOL/L — SIGNIFICANT CHANGE UP (ref 7–14)
APTT BLD: 28 SEC — SIGNIFICANT CHANGE UP (ref 27–39.2)
AST SERPL-CCNC: 21 U/L — SIGNIFICANT CHANGE UP (ref 0–41)
BASOPHILS # BLD AUTO: 0.02 K/UL — SIGNIFICANT CHANGE UP (ref 0–0.2)
BASOPHILS NFR BLD AUTO: 0.3 % — SIGNIFICANT CHANGE UP (ref 0–1)
BILIRUB SERPL-MCNC: 0.4 MG/DL — SIGNIFICANT CHANGE UP (ref 0.2–1.2)
BUN SERPL-MCNC: 15 MG/DL — SIGNIFICANT CHANGE UP (ref 10–20)
CALCIUM SERPL-MCNC: 9.8 MG/DL — SIGNIFICANT CHANGE UP (ref 8.5–10.1)
CHLORIDE SERPL-SCNC: 98 MMOL/L — SIGNIFICANT CHANGE UP (ref 98–110)
CO2 SERPL-SCNC: 26 MMOL/L — SIGNIFICANT CHANGE UP (ref 17–32)
CREAT SERPL-MCNC: 0.8 MG/DL — SIGNIFICANT CHANGE UP (ref 0.7–1.5)
EOSINOPHIL # BLD AUTO: 0.13 K/UL — SIGNIFICANT CHANGE UP (ref 0–0.7)
EOSINOPHIL NFR BLD AUTO: 2 % — SIGNIFICANT CHANGE UP (ref 0–8)
GLUCOSE SERPL-MCNC: 87 MG/DL — SIGNIFICANT CHANGE UP (ref 70–99)
HCT VFR BLD CALC: 40.3 % — SIGNIFICANT CHANGE UP (ref 37–47)
HGB BLD-MCNC: 13.8 G/DL — SIGNIFICANT CHANGE UP (ref 12–16)
IMM GRANULOCYTES NFR BLD AUTO: 0.3 % — SIGNIFICANT CHANGE UP (ref 0.1–0.3)
INR BLD: 1.14 RATIO — SIGNIFICANT CHANGE UP (ref 0.65–1.3)
LACTATE SERPL-SCNC: 0.6 MMOL/L — SIGNIFICANT CHANGE UP (ref 0.5–2.2)
LYMPHOCYTES # BLD AUTO: 1.88 K/UL — SIGNIFICANT CHANGE UP (ref 1.2–3.4)
LYMPHOCYTES # BLD AUTO: 28.4 % — SIGNIFICANT CHANGE UP (ref 20.5–51.1)
MCHC RBC-ENTMCNC: 29.9 PG — SIGNIFICANT CHANGE UP (ref 27–31)
MCHC RBC-ENTMCNC: 34.2 G/DL — SIGNIFICANT CHANGE UP (ref 32–37)
MCV RBC AUTO: 87.2 FL — SIGNIFICANT CHANGE UP (ref 81–99)
MONOCYTES # BLD AUTO: 0.51 K/UL — SIGNIFICANT CHANGE UP (ref 0.1–0.6)
MONOCYTES NFR BLD AUTO: 7.7 % — SIGNIFICANT CHANGE UP (ref 1.7–9.3)
NEUTROPHILS # BLD AUTO: 4.05 K/UL — SIGNIFICANT CHANGE UP (ref 1.4–6.5)
NEUTROPHILS NFR BLD AUTO: 61.3 % — SIGNIFICANT CHANGE UP (ref 42.2–75.2)
NRBC # BLD: 0 /100 WBCS — SIGNIFICANT CHANGE UP (ref 0–0)
PLATELET # BLD AUTO: 275 K/UL — SIGNIFICANT CHANGE UP (ref 130–400)
POTASSIUM SERPL-MCNC: 4.5 MMOL/L — SIGNIFICANT CHANGE UP (ref 3.5–5)
POTASSIUM SERPL-SCNC: 4.5 MMOL/L — SIGNIFICANT CHANGE UP (ref 3.5–5)
PROT SERPL-MCNC: 7.4 G/DL — SIGNIFICANT CHANGE UP (ref 6–8)
PROTHROM AB SERPL-ACNC: 12.3 SEC — SIGNIFICANT CHANGE UP (ref 9.95–12.87)
RBC # BLD: 4.62 M/UL — SIGNIFICANT CHANGE UP (ref 4.2–5.4)
RBC # FLD: 12.7 % — SIGNIFICANT CHANGE UP (ref 11.5–14.5)
SODIUM SERPL-SCNC: 137 MMOL/L — SIGNIFICANT CHANGE UP (ref 135–146)
WBC # BLD: 6.61 K/UL — SIGNIFICANT CHANGE UP (ref 4.8–10.8)
WBC # FLD AUTO: 6.61 K/UL — SIGNIFICANT CHANGE UP (ref 4.8–10.8)

## 2018-09-17 RX ORDER — CEFEPIME 1 G/1
INJECTION, POWDER, FOR SOLUTION INTRAMUSCULAR; INTRAVENOUS
Qty: 0 | Refills: 0 | Status: DISCONTINUED | OUTPATIENT
Start: 2018-09-17 | End: 2018-09-17

## 2018-09-17 RX ORDER — CLONAZEPAM 1 MG
0.5 TABLET ORAL DAILY
Qty: 0 | Refills: 0 | Status: DISCONTINUED | OUTPATIENT
Start: 2018-09-17 | End: 2018-09-21

## 2018-09-17 RX ORDER — ENOXAPARIN SODIUM 100 MG/ML
40 INJECTION SUBCUTANEOUS EVERY 24 HOURS
Qty: 0 | Refills: 0 | Status: DISCONTINUED | OUTPATIENT
Start: 2018-09-17 | End: 2018-09-21

## 2018-09-17 RX ORDER — SODIUM CHLORIDE 9 MG/ML
1000 INJECTION INTRAMUSCULAR; INTRAVENOUS; SUBCUTANEOUS ONCE
Qty: 0 | Refills: 0 | Status: COMPLETED | OUTPATIENT
Start: 2018-09-17 | End: 2018-09-17

## 2018-09-17 RX ORDER — CLONAZEPAM 1 MG
1.5 TABLET ORAL
Qty: 0 | Refills: 0 | COMMUNITY

## 2018-09-17 RX ORDER — CLONAZEPAM 1 MG
1 TABLET ORAL AT BEDTIME
Qty: 0 | Refills: 0 | Status: DISCONTINUED | OUTPATIENT
Start: 2018-09-17 | End: 2018-09-21

## 2018-09-17 RX ORDER — ALBUTEROL 90 UG/1
2 AEROSOL, METERED ORAL EVERY 6 HOURS
Qty: 0 | Refills: 0 | Status: DISCONTINUED | OUTPATIENT
Start: 2018-09-17 | End: 2018-09-21

## 2018-09-17 RX ORDER — CEFEPIME 1 G/1
2000 INJECTION, POWDER, FOR SOLUTION INTRAMUSCULAR; INTRAVENOUS EVERY 8 HOURS
Qty: 0 | Refills: 0 | Status: DISCONTINUED | OUTPATIENT
Start: 2018-09-18 | End: 2018-09-20

## 2018-09-17 RX ORDER — VANCOMYCIN HCL 1 G
1000 VIAL (EA) INTRAVENOUS ONCE
Qty: 0 | Refills: 0 | Status: COMPLETED | OUTPATIENT
Start: 2018-09-17 | End: 2018-09-17

## 2018-09-17 RX ORDER — VALSARTAN 80 MG/1
0 TABLET ORAL
Qty: 0 | Refills: 0 | COMMUNITY

## 2018-09-17 RX ORDER — BUDESONIDE AND FORMOTEROL FUMARATE DIHYDRATE 160; 4.5 UG/1; UG/1
2 AEROSOL RESPIRATORY (INHALATION)
Qty: 0 | Refills: 0 | Status: DISCONTINUED | OUTPATIENT
Start: 2018-09-17 | End: 2018-09-21

## 2018-09-17 RX ORDER — VANCOMYCIN HCL 1 G
1250 VIAL (EA) INTRAVENOUS EVERY 12 HOURS
Qty: 0 | Refills: 0 | Status: DISCONTINUED | OUTPATIENT
Start: 2018-09-17 | End: 2018-09-20

## 2018-09-17 RX ORDER — CEFEPIME 1 G/1
INJECTION, POWDER, FOR SOLUTION INTRAMUSCULAR; INTRAVENOUS
Qty: 0 | Refills: 0 | Status: DISCONTINUED | OUTPATIENT
Start: 2018-09-18 | End: 2018-09-20

## 2018-09-17 RX ORDER — CEFEPIME 1 G/1
2000 INJECTION, POWDER, FOR SOLUTION INTRAMUSCULAR; INTRAVENOUS ONCE
Qty: 0 | Refills: 0 | Status: COMPLETED | OUTPATIENT
Start: 2018-09-17 | End: 2018-09-18

## 2018-09-17 RX ORDER — COLCHICINE 0.6 MG
0.6 TABLET ORAL DAILY
Qty: 0 | Refills: 0 | Status: DISCONTINUED | OUTPATIENT
Start: 2018-09-17 | End: 2018-09-21

## 2018-09-17 RX ORDER — LEVOTHYROXINE SODIUM 125 MCG
200 TABLET ORAL DAILY
Qty: 0 | Refills: 0 | Status: DISCONTINUED | OUTPATIENT
Start: 2018-09-17 | End: 2018-09-21

## 2018-09-17 RX ORDER — LOSARTAN POTASSIUM 100 MG/1
100 TABLET, FILM COATED ORAL DAILY
Qty: 0 | Refills: 0 | Status: DISCONTINUED | OUTPATIENT
Start: 2018-09-17 | End: 2018-09-21

## 2018-09-17 RX ADMIN — SODIUM CHLORIDE 1000 MILLILITER(S): 9 INJECTION INTRAMUSCULAR; INTRAVENOUS; SUBCUTANEOUS at 17:44

## 2018-09-17 RX ADMIN — SODIUM CHLORIDE 1000 MILLILITER(S): 9 INJECTION INTRAMUSCULAR; INTRAVENOUS; SUBCUTANEOUS at 17:05

## 2018-09-17 RX ADMIN — Medication 250 MILLIGRAM(S): at 17:38

## 2018-09-17 NOTE — ED PROVIDER NOTE - PROGRESS NOTE DETAILS
Patient seen and evaluated by me. Labs/imaging ordered. Started on 1L NS IV and ordered 1g vancomycin IVPB as per podiatry's written instructions with patient. Spoke with podiatry resident who will come see patient.

## 2018-09-17 NOTE — H&P ADULT - ATTENDING COMMENTS
Patient seen and examined at bedside. Agree with above. IV antibiotics as ordered (penicillin allergy). Local wound care per Podiatry. Prognosis guarded Patient seen and examined at bedside. Agree with above. Left 2nd toe infected ulcer. IV antibiotics as ordered (penicillin allergy). Local wound care per Podiatry. Follow up x-rays. Arterial doppler. Wound cultures and sensitivity. ID consult. Podiatry follow up. Hypothyroidism/HTN stable, continue usual medications. Prognosis guarded

## 2018-09-17 NOTE — ED PROVIDER NOTE - PHYSICAL EXAMINATION
Vital Signs: I have reviewed the initial vital signs.  Constitutional: NAD, well-nourished, appears stated age, no acute distress.  HEENT: Airway patent, moist MM, no erythema/swelling/deformity of oral structures. EOMI, PERRLA.  CV: regular rate, regular rhythm, well-perfused extremities, 2+ b/l DP and radial pulses equal.  Lungs: BCTA, no increased WOB.  ABD: NTND, no guarding or rebound, no pulsatile mass, no hernias.   MSK: Neck supple, nontender, nl ROM, no stepoff. Chest nontender. Back nontender in TLS spine or to b/l bony structures or flanks. Ext nontender, nl rom, no deformity. (+) Left second toe erythematous with (+) pus drainage  INTEG: Skin warm, dry, no rash.  NEURO: A&Ox3, CN II-XII intact, normal strength 5/5 all 4 ext, nl sensation throughout, normal speech and coordination.  PSYCH: Calm, cooperative, normal affect and interaction.

## 2018-09-17 NOTE — CONSULT NOTE ADULT - SUBJECTIVE AND OBJECTIVE BOX
PODIATRY CONSULT   ALDO DYKES is a pleasant well-nourished, well developed 73y Female in no acute distress, alert awake, and oriented to person, place and time.   Patient is a 73y old  Female who presents with a chief complaint of left 2nd digit infection.  HPI:  73 year old female, pmhx HTN, COPD, arthritis, hypothyroidism, depression, presenting with a several week history of worsening left second toe infection. Patient's son was available at the bedside was able to translate for me.  Patient is being followed outpatient by Dr. Olivarez from podiatry and has been on multiple outpatient abx without improvement so she was sent to the ED by podiatry for admission for IV abx and possible surgical debridement.  pt states that she had some types of surgery done one month ago on left 2nd and 3rd digit by Dr. olivarez and left 2nd digit was not healing with po abx.  she describes the pain as shooting pain when resting.  Patient otherwise denies systemic symptoms, including fevers, headache, vision changes, weakness/numbness, confusion, URI symptoms, neck pain, chest pain, back pain, dyspnea, cough, palpitations, nausea, vomiting, abdominal pain, diarrhea, constipation, blood in stool/dark stools, urinary symptoms, vaginal bleeding/discharge, leg swelling, rash, recent travel or sick contacts.      PMH: Chronic obstructive pulmonary disease, unspecified COPD type  Asthma  Osteoarthritis  Blood clot in vein  Hypothyroid  HTN (hypertension)  Depression  Arthritis  Hypothyroidism  HTN (hypertension)    PSH: H/O hernia repair  History of cholecystectomy  H/O tubal ligation  S/P tonsillectomy    Medication   Allergy: penicillin (Hives)  penicillins (Rash; Flushing; Hives)        Labs:                        13.8   6.61  )-----------( 275      ( 17 Sep 2018 16:27 )             40.3       09-17    137  |  98  |  15  ----------------------------<  87  4.5   |  26  |  0.8    Ca    9.8      17 Sep 2018 16:27    TPro  7.4  /  Alb  4.5  /  TBili  0.4  /  DBili  x   /  AST  21  /  ALT  14  /  AlkPhos  91  09-17            O:   Derm: Ulceration Left 2nd digit medial aspect, - hyperkeratotic border, wound base Granular , wound size (0.2cm X 0.2cm X 0.2cm) + edema, +ponce-wound erythema, - purulence, - fluctuance, - tracking/tunneling, + probe to bone. - streaking erythema. - xerosis, - hemosiderin deposits. + active sign of infection, -malodor  Ulceration Left 2nd digit plantar aspect PIPJ, - hyperkeratotic border, wound base fibrotic with maceration around the periwound , wound size (0.4cm X 0.2cm X 0.2cm) + edema, +ponce-wound erythema, - purulence, - fluctuance, - tracking/tunneling, - probe to bone. - streaking erythema. - xerosis, - hemosiderin deposits. + active sign of infection  Vascular: Dorsalis Pedis and Posterior Tibial pulses 2/4.  Capillary re-fill time less then 3 seconds digits 1-5 bilateral.  B/L feet warm to touch  Neuro: Protective sensation intact/diminished to the level of the digits bilateral.  MSK: Muscle strength 5/5 all major muscle groups bilateral.        Assessment and Plan:   Pt with left 2nd digit with OM and cellulitis    Chart reviewed and Patient evaluated  Discussed diagnosis and treatment with patient  Applied betadine with dry sterile dressing  continue Darco shoe when ambulating  X-rays ordered left foot  Recommend ID consult  recommend long term IV abx  continue IV abx  Podiatry will follow while in house  no surgical intervention at this time, will discuss with attending for further evaluation and management.

## 2018-09-17 NOTE — H&P ADULT - HISTORY OF PRESENT ILLNESS
72 y/o Mongolian speaking FM w/ PMHX of  HTN, Gout, COPD, OA, hypothyroidism, depression, presenting with a several week history of worsening left second toe infection. Pt's son Mr. Alford was at bedside and provided translation. Pt follows w/ Dr. olivarez as outpt from podiatry for hx of ulcers on her left foot. Pt had hammar toes repaired on 7/26 on her 2nd and 3rd left toe. Weeks after discharge pt began developing an ulcer that progressively got worse where it began ooze pus. She was prescribed 10 day course of bactrim by Dr. Olivarez for 10 days but only took for 8 days since the wound was not healing. She was sent in by Dr. Olivarez for IV antibiotics. Pt states the ulcer got deep enough to see the bone. Currently denies N/V/D, fever, chills, sob, dizziness, confusion, chest pain, or syncope.     Pt is able to bear weight on the leg and walks w/ darco shoes. She lives at home w/ her sons and has no needs

## 2018-09-17 NOTE — ED ADULT NURSE NOTE - OBJECTIVE STATEMENT
74 y/o female complaining of left second toe pain s/p surgery 1 month ago not tolerating po abx sent for IV abx.

## 2018-09-17 NOTE — H&P ADULT - ASSESSMENT
72 y/o Filipino speaking FM w/ PMHX of  HTN, Gout, COPD, OA, hypothyroidism, depression, presenting with a several week history of worsening left second toe infection not responding PO Bactrim. Pt admitted to medicine to receive IV antibitoics    #Left 2nd digit Infected Ulcer and Possible Osteomyelitis - No evidence of Sepsis  - Vitals wnl; PE: Probe to bone test positive. PP+1 in LLE  - Will start pt empirically on Vanc and Cefepime can de-escalate based on wound culture  - as per podiatry, no surgical intervention for now  - f/u Xray of left foot  - f/u Arterial duplex  - f/u ESR  - f/u ID consult - as requested by podiatry    #Questionable Hx of DVT as per son in the past  - f/u venous duplex    #HTN - Controlled  - c/w losartan    #hypothyroidism   - c/w Synthroid    #COPD  - C/w Proair and Symbicort    #Gout  - c/w Colchicine    #Anxiety/Depression  - c/w Clonazepam    #Activity - OOBC  #Diet - DASh  #DVT/GI PPX - Lovenox and not indicated  #Code - full  #Dispo - from home 74 y/o Ugandan speaking FM w/ PMHX of  HTN, Gout, COPD, OA, hypothyroidism, depression, presenting with a several week history of worsening left second toe infection not responding PO Bactrim. Pt admitted to medicine to receive IV antibitoics    #Left 2nd digit Infected Ulcer and Possible Osteomyelitis - No evidence of Sepsis  - Vitals wnl; PE: Probe to bone test positive. PP+1 in LLE  - Will start pt empirically on Vanc and Cefepime can de-escalate based on wound culture  - as per podiatry, no surgical intervention for now  - f/u Xray of left foot  - f/u Arterial duplex  - f/u ESR  - f/u ID consult - as requested by podiatry    #Questionable Hx of DVT as per son in the past - Pt was on AC for only 1 month but unsure of the medication  - f/u venous duplex    #HTN - Controlled  - c/w losartan    #hypothyroidism   - c/w Synthroid    #COPD  - C/w Proair and Symbicort    #Gout  - c/w Colchicine    #Anxiety/Depression  - c/w Clonazepam    #Activity - OOBC  #Diet - DASh  #DVT/GI PPX - Lovenox and not indicated  #Code - full  #Dispo - from home

## 2018-09-17 NOTE — ED PROVIDER NOTE - MEDICAL DECISION MAKING DETAILS
Patient presented with second left toe infection for several weeks and failure of outpatient abx. Was sent by podiatry for IV abx and admission, possible debridement. patient otherwise hemodynamically stable. Started on IV abx, labs obtained, and patient to be admitted for further management.

## 2018-09-17 NOTE — CONSULT NOTE ADULT - ATTENDING COMMENTS
X rays positive for osteomyelitis  No surgical intervention at this time  Recommend PICC line with 6 weeks abx  Case discussed with medicine and Infectious disease Dr. Ho  local wound care with Iodosorb gel

## 2018-09-18 DIAGNOSIS — L97.522 NON-PRESSURE CHRONIC ULCER OF OTHER PART OF LEFT FOOT WITH FAT LAYER EXPOSED: ICD-10-CM

## 2018-09-18 LAB
ANION GAP SERPL CALC-SCNC: 13 MMOL/L — SIGNIFICANT CHANGE UP (ref 7–14)
BASOPHILS # BLD AUTO: 0.04 K/UL — SIGNIFICANT CHANGE UP (ref 0–0.2)
BASOPHILS NFR BLD AUTO: 0.8 % — SIGNIFICANT CHANGE UP (ref 0–1)
BLD GP AB SCN SERPL QL: SIGNIFICANT CHANGE UP
BUN SERPL-MCNC: 13 MG/DL — SIGNIFICANT CHANGE UP (ref 10–20)
CALCIUM SERPL-MCNC: 9.3 MG/DL — SIGNIFICANT CHANGE UP (ref 8.5–10.1)
CHLORIDE SERPL-SCNC: 104 MMOL/L — SIGNIFICANT CHANGE UP (ref 98–110)
CO2 SERPL-SCNC: 24 MMOL/L — SIGNIFICANT CHANGE UP (ref 17–32)
CREAT SERPL-MCNC: 0.7 MG/DL — SIGNIFICANT CHANGE UP (ref 0.7–1.5)
EOSINOPHIL # BLD AUTO: 0.16 K/UL — SIGNIFICANT CHANGE UP (ref 0–0.7)
EOSINOPHIL NFR BLD AUTO: 3.4 % — SIGNIFICANT CHANGE UP (ref 0–8)
ERYTHROCYTE [SEDIMENTATION RATE] IN BLOOD: 8 MM/HR — SIGNIFICANT CHANGE UP (ref 0–20)
GLUCOSE SERPL-MCNC: 101 MG/DL — HIGH (ref 70–99)
HCT VFR BLD CALC: 38.2 % — SIGNIFICANT CHANGE UP (ref 37–47)
HGB BLD-MCNC: 13.2 G/DL — SIGNIFICANT CHANGE UP (ref 12–16)
IMM GRANULOCYTES NFR BLD AUTO: 0.4 % — HIGH (ref 0.1–0.3)
LYMPHOCYTES # BLD AUTO: 1.65 K/UL — SIGNIFICANT CHANGE UP (ref 1.2–3.4)
LYMPHOCYTES # BLD AUTO: 34.8 % — SIGNIFICANT CHANGE UP (ref 20.5–51.1)
MCHC RBC-ENTMCNC: 30 PG — SIGNIFICANT CHANGE UP (ref 27–31)
MCHC RBC-ENTMCNC: 34.6 G/DL — SIGNIFICANT CHANGE UP (ref 32–37)
MCV RBC AUTO: 86.8 FL — SIGNIFICANT CHANGE UP (ref 81–99)
MONOCYTES # BLD AUTO: 0.52 K/UL — SIGNIFICANT CHANGE UP (ref 0.1–0.6)
MONOCYTES NFR BLD AUTO: 11 % — HIGH (ref 1.7–9.3)
NEUTROPHILS # BLD AUTO: 2.35 K/UL — SIGNIFICANT CHANGE UP (ref 1.4–6.5)
NEUTROPHILS NFR BLD AUTO: 49.6 % — SIGNIFICANT CHANGE UP (ref 42.2–75.2)
NRBC # BLD: 0 /100 WBCS — SIGNIFICANT CHANGE UP (ref 0–0)
PLATELET # BLD AUTO: 237 K/UL — SIGNIFICANT CHANGE UP (ref 130–400)
POTASSIUM SERPL-MCNC: 4.6 MMOL/L — SIGNIFICANT CHANGE UP (ref 3.5–5)
POTASSIUM SERPL-SCNC: 4.6 MMOL/L — SIGNIFICANT CHANGE UP (ref 3.5–5)
RBC # BLD: 4.4 M/UL — SIGNIFICANT CHANGE UP (ref 4.2–5.4)
RBC # FLD: 12.6 % — SIGNIFICANT CHANGE UP (ref 11.5–14.5)
SODIUM SERPL-SCNC: 141 MMOL/L — SIGNIFICANT CHANGE UP (ref 135–146)
TYPE + AB SCN PNL BLD: SIGNIFICANT CHANGE UP
WBC # BLD: 4.74 K/UL — LOW (ref 4.8–10.8)
WBC # FLD AUTO: 4.74 K/UL — LOW (ref 4.8–10.8)

## 2018-09-18 PROCEDURE — 93926 LOWER EXTREMITY STUDY: CPT | Mod: 26

## 2018-09-18 PROCEDURE — 93970 EXTREMITY STUDY: CPT | Mod: 26

## 2018-09-18 RX ORDER — INFLUENZA VIRUS VACCINE 15; 15; 15; 15 UG/.5ML; UG/.5ML; UG/.5ML; UG/.5ML
0.5 SUSPENSION INTRAMUSCULAR ONCE
Qty: 0 | Refills: 0 | Status: DISCONTINUED | OUTPATIENT
Start: 2018-09-18 | End: 2018-09-21

## 2018-09-18 RX ORDER — TRAMADOL HYDROCHLORIDE 50 MG/1
50 TABLET ORAL EVERY 6 HOURS
Qty: 0 | Refills: 0 | Status: DISCONTINUED | OUTPATIENT
Start: 2018-09-18 | End: 2018-09-19

## 2018-09-18 RX ADMIN — Medication 166.67 MILLIGRAM(S): at 06:59

## 2018-09-18 RX ADMIN — CEFEPIME 100 MILLIGRAM(S): 1 INJECTION, POWDER, FOR SOLUTION INTRAMUSCULAR; INTRAVENOUS at 15:08

## 2018-09-18 RX ADMIN — Medication 1 MILLIGRAM(S): at 21:18

## 2018-09-18 RX ADMIN — Medication 1 MILLIGRAM(S): at 00:38

## 2018-09-18 RX ADMIN — Medication 200 MICROGRAM(S): at 06:59

## 2018-09-18 RX ADMIN — BUDESONIDE AND FORMOTEROL FUMARATE DIHYDRATE 2 PUFF(S): 160; 4.5 AEROSOL RESPIRATORY (INHALATION) at 11:42

## 2018-09-18 RX ADMIN — TRAMADOL HYDROCHLORIDE 50 MILLIGRAM(S): 50 TABLET ORAL at 11:42

## 2018-09-18 RX ADMIN — CEFEPIME 100 MILLIGRAM(S): 1 INJECTION, POWDER, FOR SOLUTION INTRAMUSCULAR; INTRAVENOUS at 21:18

## 2018-09-18 RX ADMIN — ENOXAPARIN SODIUM 40 MILLIGRAM(S): 100 INJECTION SUBCUTANEOUS at 11:41

## 2018-09-18 RX ADMIN — Medication 166.67 MILLIGRAM(S): at 17:10

## 2018-09-18 RX ADMIN — Medication 0.5 MILLIGRAM(S): at 11:42

## 2018-09-18 RX ADMIN — CEFEPIME 100 MILLIGRAM(S): 1 INJECTION, POWDER, FOR SOLUTION INTRAMUSCULAR; INTRAVENOUS at 06:58

## 2018-09-18 RX ADMIN — Medication 0.6 MILLIGRAM(S): at 11:42

## 2018-09-18 RX ADMIN — LOSARTAN POTASSIUM 100 MILLIGRAM(S): 100 TABLET, FILM COATED ORAL at 06:59

## 2018-09-18 RX ADMIN — CEFEPIME 100 MILLIGRAM(S): 1 INJECTION, POWDER, FOR SOLUTION INTRAMUSCULAR; INTRAVENOUS at 00:39

## 2018-09-18 NOTE — CONSULT NOTE ADULT - ASSESSMENT
73F    HTN  Gout  COPD  7/26 hammer toe surgery    Admitted with L 2nd toe OM as +PTB and +xray  Sepsis ruled out on admission    - Check MRSA nasal PCR  - Check ESR/CRP  - Podiatry following ?surgical intervention, if no plan, will need PICC and 6 weeks IV abx  - Please send cultures if possible   - Vanc/cefepime  - Please check vanc trough 30 min prior to 4th dose    Spectra [id number]    HTN  Gout  COPD  7/26 hammer toe surgery    Admitted with L 2nd toe OM as +PTB and +xray  Sepsis ruled out on admission    - Check MRSA nasal PCR  - Check ESR/CRP  - Podiatry following ?surgical intervention, if no plan, will need PICC and 6 weeks IV abx  - Consider MRI foot  - Please send cultures if possible   - Vanc/cefepime  - Please check vanc trough 30 min prior to 4th dose    Spectra [phone]

## 2018-09-18 NOTE — PROGRESS NOTE ADULT - SUBJECTIVE AND OBJECTIVE BOX
SUBJECTIVE:    Patient is a 73y old Female who presents with a chief complaint of left second toe infected ulcer (18 Sep 2018 10:42)    Patient complain of pain from     PAST MEDICAL & SURGICAL HISTORY  Chronic obstructive pulmonary disease, unspecified COPD type  Asthma: no recent attacks  Osteoarthritis  Blood clot in vein: 5 y ago with treatment  Hypothyroid  HTN (hypertension)  Depression: no suicide ideation  Arthritis: OA  H/O hernia repair: may 25 2018  History of cholecystectomy  H/O tubal ligation  S/P tonsillectomy    SOCIAL HISTORY:  Negative for smoking/alcohol/drug use.     ALLERGIES:  penicillin (Hives)  penicillins (Rash; Flushing; Hives)    MEDICATIONS:  STANDING MEDICATIONS  buDESOnide  80 MICROgram(s)/formoterol 4.5 MICROgram(s) Inhaler 2 Puff(s) Inhalation two times a day  cefepime   IVPB      cefepime   IVPB 2000 milliGRAM(s) IV Intermittent every 8 hours  clonazePAM Tablet 0.5 milliGRAM(s) Oral daily  clonazePAM Tablet 1 milliGRAM(s) Oral at bedtime  colchicine 0.6 milliGRAM(s) Oral daily  enoxaparin Injectable 40 milliGRAM(s) SubCutaneous every 24 hours  influenza   Vaccine 0.5 milliLiter(s) IntraMuscular once  levothyroxine 200 MICROGram(s) Oral daily  losartan 100 milliGRAM(s) Oral daily  vancomycin  IVPB 1250 milliGRAM(s) IV Intermittent every 12 hours    PRN MEDICATIONS  ALBUTerol    90 MICROgram(s) HFA Inhaler 2 Puff(s) Inhalation every 6 hours PRN  traMADol 50 milliGRAM(s) Oral every 6 hours PRN    VITALS:   T(F): 97.8  HR: 55  BP: 138/62  RR: 18  SpO2: 98%    LABS:                        13.2   4.74  )-----------( 237      ( 18 Sep 2018 07:54 )             38.2     09-18    141  |  104  |  13  ----------------------------<  101<H>  4.6   |  24  |  0.7    Ca    9.3      18 Sep 2018 07:54    TPro  7.4  /  Alb  4.5  /  TBili  0.4  /  DBili  x   /  AST  21  /  ALT  14  /  AlkPhos  91  09-17    PT/INR - ( 17 Sep 2018 16:48 )   PT: 12.30 sec;   INR: 1.14 ratio         PTT - ( 17 Sep 2018 16:48 )  PTT:28.0 sec      Sedimentation Rate, Erythrocyte: 8 mm/hr (09-18-18 @ 07:54)  Lactate, Blood: 0.6 mmol/L (09-17-18 @ 16:27)              09-17-18 @ 07:01  -  09-18-18 @ 07:00  --------------------------------------------------------  IN: 0 mL / OUT: 1 mL / NET: -1 mL          PHYSICAL EXAM:  GEN: NAD, comfortable  LUNGS: CTAB, no w/r/r  HEART: RRR, no m/r/g  ABD: soft, NT/ND, +BS  EXT: no edema, PP b/l  NEURO: AAOx3 SUBJECTIVE:    Patient is a 73y old Female who presents with a chief complaint of left second toe infected ulcer (18 Sep 2018 10:42)    Patient complain of pain from infection site otherwise no other complaints.    PAST MEDICAL & SURGICAL HISTORY  Chronic obstructive pulmonary disease, unspecified COPD type  Asthma: no recent attacks  Osteoarthritis  Blood clot in vein: 5 y ago with treatment  Hypothyroid  HTN (hypertension)  Depression: no suicide ideation  Arthritis: OA  H/O hernia repair: may 25 2018  History of cholecystectomy  H/O tubal ligation  S/P tonsillectomy    SOCIAL HISTORY:  Negative for smoking/alcohol/drug use.     ALLERGIES:  penicillin (Hives)  penicillins (Rash; Flushing; Hives)    MEDICATIONS:  STANDING MEDICATIONS  buDESOnide  80 MICROgram(s)/formoterol 4.5 MICROgram(s) Inhaler 2 Puff(s) Inhalation two times a day  cefepime   IVPB      cefepime   IVPB 2000 milliGRAM(s) IV Intermittent every 8 hours  clonazePAM Tablet 0.5 milliGRAM(s) Oral daily  clonazePAM Tablet 1 milliGRAM(s) Oral at bedtime  colchicine 0.6 milliGRAM(s) Oral daily  enoxaparin Injectable 40 milliGRAM(s) SubCutaneous every 24 hours  influenza   Vaccine 0.5 milliLiter(s) IntraMuscular once  levothyroxine 200 MICROGram(s) Oral daily  losartan 100 milliGRAM(s) Oral daily  vancomycin  IVPB 1250 milliGRAM(s) IV Intermittent every 12 hours    PRN MEDICATIONS  ALBUTerol    90 MICROgram(s) HFA Inhaler 2 Puff(s) Inhalation every 6 hours PRN  traMADol 50 milliGRAM(s) Oral every 6 hours PRN    VITALS:   T(F): 97.8  HR: 55  BP: 138/62  RR: 18  SpO2: 98%    LABS:                        13.2   4.74  )-----------( 237      ( 18 Sep 2018 07:54 )             38.2     09-18    141  |  104  |  13  ----------------------------<  101<H>  4.6   |  24  |  0.7    Ca    9.3      18 Sep 2018 07:54    TPro  7.4  /  Alb  4.5  /  TBili  0.4  /  DBili  x   /  AST  21  /  ALT  14  /  AlkPhos  91  09-17    PT/INR - ( 17 Sep 2018 16:48 )   PT: 12.30 sec;   INR: 1.14 ratio         PTT - ( 17 Sep 2018 16:48 )  PTT:28.0 sec      Sedimentation Rate, Erythrocyte: 8 mm/hr (09-18-18 @ 07:54)  Lactate, Blood: 0.6 mmol/L (09-17-18 @ 16:27)              09-17-18 @ 07:01  -  09-18-18 @ 07:00  --------------------------------------------------------  IN: 0 mL / OUT: 1 mL / NET: -1 mL          PHYSICAL EXAM:  GEN: NAD, comfortable  LUNGS: CTAB, no w/r/r  HEART: RRR, no m/r/g  ABD: soft, NT/ND, +BS  EXT: no edema, PP b/l  NEURO: AAOx3

## 2018-09-18 NOTE — PATIENT PROFILE ADULT. - ABILITY TO HEAR (WITH HEARING AID OR HEARING APPLIANCE IF NORMALLY USED):
October 18, 2017      Ana Laura Downs MD  [address]  [address]  [facility] [address]           [facility]  [address] [address]  Phone: [phone]  Fax: [phone]          Patient: Cherie Fuller   MR Number: [id number]   YOB: 1954   Date of Visit: 10/9/2017       Dear Dr. Ana Laura Downs:    Thank you for referring Cherie Fuller to me for evaluation. Attached you will find relevant portions of my assessment and plan of care.    If you have questions, please do not hesitate to call me. I look forward to following Cherie Fuller along with you.    Sincerely,    [facility]  CC:  No Recipients    If you would like to receive this communication electronically, please contact [email or web address] or [phone] to request more information on [employer]Â® Link access.    For providers and/or their staff who would like to refer a patient to [facility], please contact us through our one-stop-shop provider referral line, Donald Goff, at [phone].    If you feel you have received this communication in error or would no longer like to receive these types of communications, please e-mail [email or web address]          Adequate: hears normal conversation without difficulty

## 2018-09-18 NOTE — PROGRESS NOTE ADULT - ASSESSMENT
72 y/o Portuguese speaking FM w/ PMHX of  HTN, Gout, COPD, OA, hypothyroidism, depression, presenting with a several week history of worsening left second toe infection not responding PO Bactrim. Pt admitted to medicine to receive IV antibiotics.    #Left 2nd digit Infected Ulcer and Osteomyelitis  - Positive probe to bone, findings compatible with osteomyelitis seen on X-ray of foot  - Will start pt empirically on Vanc and Cefepime can de-escalate based on wound culture  - as per podiatry, no surgical intervention for now  - f/u Xray of left foot  - f/u Arterial duplex  - f/u ESR  - f/u ID consult - as requested by podiatry    #Questionable Hx of DVT as per son in the past - Pt was on AC for only 1 month but unsure of the medication  - f/u venous duplex    #HTN - Controlled  - c/w losartan    #hypothyroidism   - c/w Synthroid    #COPD  - C/w Proair and Symbicort    #Gout  - c/w Colchicine    #Anxiety/Depression  - c/w Clonazepam    #Activity - OOBC  #Diet - DASh  #DVT/GI PPX - Lovenox and not indicated  #Code - full  #Dispo - from home 74 y/o Beninese speaking FM w/ PMHX of  HTN, Gout, COPD, OA, hypothyroidism, depression, presenting with a several week history of worsening left second toe infection not responding PO Bactrim. Pt admitted to medicine to receive IV antibiotics.    #Left 2nd digit Infected Ulcer and Osteomyelitis  - Positive probe to bone, findings compatible with osteomyelitis seen on X-ray of foot  - on Vanc and Cefepime   - as per podiatry, no surgical intervention  -       #Questionable Hx of DVT as per son in the past - Pt was on AC for only 1 month but unsure of the medication  - f/u venous duplex    #HTN - Controlled  - c/w losartan    #hypothyroidism   - c/w Synthroid    #COPD  - C/w Proair and Symbicort    #Gout  - c/w Colchicine    #Anxiety/Depression  - c/w Clonazepam    #Activity - OOBC  #Diet - DASh  #DVT/GI PPX - Lovenox and not indicated  #Code - full  #Dispo - from home 74 y/o Honduran speaking FM w/ PMHX of  HTN, Gout, COPD, OA, hypothyroidism, depression, presenting with a several week history of worsening left second toe infection not responding PO Bactrim. Pt admitted to medicine to receive IV antibiotics.    #Left 2nd digit Infected Ulcer and Osteomyelitis  - Positive probe to bone, findings compatible with osteomyelitis seen on X-ray of foot  - on Vanc and Cefepime   - as per podiatry, no surgical intervention  - ordered CRP as per ID, wound cultures also sent  - ESR is 8  - will f/u arterial duplex  - as there is no plan for surgery, patient will need placement of PICC line and 6 weeks IV abx. will have IR on board for PICC placement    #Questionable Hx of DVT as per son in the past - Pt was on AC for only 1 month but unsure of the medication  - f/u venous duplex    #HTN - Controlled  - c/w losartan    #hypothyroidism   - c/w Synthroid    #COPD  - C/w Proair and Symbicort    #Gout  - c/w Colchicine    #Anxiety/Depression  - c/w Clonazepam    #Activity - OOBC  #Diet - DASh  #DVT/GI PPX - Lovenox and not indicated  #Code - full  #Dispo - from home

## 2018-09-18 NOTE — CONSULT NOTE ADULT - SUBJECTIVE AND OBJECTIVE BOX
ALDO DYKES  73y, Female  Allergy: penicillin (Hives)  penicillins (Rash; Flushing; Hives)      HPI:  72 y/o Djiboutian speaking FM w/ PMHX of  HTN, Gout, COPD, OA, hypothyroidism, depression, presenting with a several week history of worsening left second toe infection. Pt's son Mr. Alford was at bedside and provided translation. Pt follows w/ Dr. olivarez as outpt from podiatry for hx of ulcers on her left foot. Pt had hammar toes repaired on 7/26 on her 2nd and 3rd left toe. Weeks after discharge pt began developing an ulcer that progressively got worse where it began ooze pus. She was prescribed 10 day course of bactrim by Dr. Olivarez for 10 days but only took for 8 days since the wound was not healing. She was sent in by Dr. Olivarez for IV antibiotics. Pt states the ulcer got deep enough to see the bone. Currently denies N/V/D, fever, chills, sob, dizziness, confusion, chest pain, or syncope.     Pt is able to bear weight on the leg and walks w/ darco shoes. She lives at home w/ her sons and has no needs (17 Sep 2018 20:32)    FAMILY HISTORY:  Family history of peripheral vascular disease (Father)    PAST MEDICAL & SURGICAL HISTORY:  Chronic obstructive pulmonary disease, unspecified COPD type  Asthma: no recent attacks  Osteoarthritis  Blood clot in vein: 5 y ago with treatment  Hypothyroid  HTN (hypertension)  Depression: no suicide ideation  Arthritis: OA  H/O hernia repair: may 25 2018  History of cholecystectomy  H/O tubal ligation  S/P tonsillectomy      ROS negative except as per HPI    VITALS:  T(F): 97.8, Max: 97.8 (09-18-18 @ 05:15)  HR: 55  BP: 138/62  RR: 18Vital Signs Last 24 Hrs  T(C): 36.6 (18 Sep 2018 05:15), Max: 36.6 (18 Sep 2018 05:15)  T(F): 97.8 (18 Sep 2018 05:15), Max: 97.8 (18 Sep 2018 05:15)  HR: 55 (18 Sep 2018 05:15) (52 - 56)  BP: 138/62 (18 Sep 2018 05:15) (127/59 - 149/67)  BP(mean): --  RR: 18 (18 Sep 2018 05:15) (18 - 18)  SpO2: 98% (17 Sep 2018 14:32) (98% - 98%)    PHYSICAL EXAM:  Gen: Awake and alert, non-toxic appearing, NAD  HEENT: NCAT. EOMI. MMM.   CV: RRR, no murmurs  Lungs: CTAB  Abd: Soft. NTND  Extr: L foot 2nd toe with swelling, erythema and ulcer, no purulence  Skin: no rash  Neuro: No focal deficits  Lines: clean    TESTS & MEASUREMENTS:                        13.2   4.74  )-----------( 237      ( 18 Sep 2018 07:54 )             38.2     09-18    141  |  104  |  13  ----------------------------<  101<H>  4.6   |  24  |  0.7    Ca    9.3      18 Sep 2018 07:54    TPro  7.4  /  Alb  4.5  /  TBili  0.4  /  DBili  x   /  AST  21  /  ALT  14  /  AlkPhos  91  09-17    LIVER FUNCTIONS - ( 17 Sep 2018 16:27 )  Alb: 4.5 g/dL / Pro: 7.4 g/dL / ALK PHOS: 91 U/L / ALT: 14 U/L / AST: 21 U/L / GGT: x                   RADIOLOGY & ADDITIONAL TESTS:    ANTIBIOTICS:    cefepime   IVPB   100 mL/Hr IV Intermittent (09-18-18 @ 00:39)    cefepime   IVPB   100 mL/Hr IV Intermittent (09-18-18 @ 06:58)    vancomycin  IVPB   166.67 mL/Hr IV Intermittent (09-18-18 @ 06:59)    vancomycin  IVPB   250 mL/Hr IV Intermittent (09-17-18 @ 17:38)

## 2018-09-19 LAB
ANION GAP SERPL CALC-SCNC: 15 MMOL/L — HIGH (ref 7–14)
BUN SERPL-MCNC: 13 MG/DL — SIGNIFICANT CHANGE UP (ref 10–20)
CALCIUM SERPL-MCNC: 9.7 MG/DL — SIGNIFICANT CHANGE UP (ref 8.5–10.1)
CHLORIDE SERPL-SCNC: 101 MMOL/L — SIGNIFICANT CHANGE UP (ref 98–110)
CO2 SERPL-SCNC: 23 MMOL/L — SIGNIFICANT CHANGE UP (ref 17–32)
CREAT SERPL-MCNC: 0.7 MG/DL — SIGNIFICANT CHANGE UP (ref 0.7–1.5)
CRP SERPL-MCNC: 0.2 MG/DL — SIGNIFICANT CHANGE UP (ref 0–0.4)
ESTIMATED AVERAGE GLUCOSE: 105 MG/DL — SIGNIFICANT CHANGE UP (ref 68–114)
GLUCOSE SERPL-MCNC: 90 MG/DL — SIGNIFICANT CHANGE UP (ref 70–99)
HBA1C BLD-MCNC: 5.3 % — SIGNIFICANT CHANGE UP (ref 4–5.6)
HCT VFR BLD CALC: 41.8 % — SIGNIFICANT CHANGE UP (ref 37–47)
HGB BLD-MCNC: 14.3 G/DL — SIGNIFICANT CHANGE UP (ref 12–16)
MCHC RBC-ENTMCNC: 29.9 PG — SIGNIFICANT CHANGE UP (ref 27–31)
MCHC RBC-ENTMCNC: 34.2 G/DL — SIGNIFICANT CHANGE UP (ref 32–37)
MCV RBC AUTO: 87.3 FL — SIGNIFICANT CHANGE UP (ref 81–99)
MRSA PCR RESULT.: NEGATIVE — SIGNIFICANT CHANGE UP
NRBC # BLD: 0 /100 WBCS — SIGNIFICANT CHANGE UP (ref 0–0)
PLATELET # BLD AUTO: 264 K/UL — SIGNIFICANT CHANGE UP (ref 130–400)
POTASSIUM SERPL-MCNC: 4.4 MMOL/L — SIGNIFICANT CHANGE UP (ref 3.5–5)
POTASSIUM SERPL-SCNC: 4.4 MMOL/L — SIGNIFICANT CHANGE UP (ref 3.5–5)
RBC # BLD: 4.79 M/UL — SIGNIFICANT CHANGE UP (ref 4.2–5.4)
RBC # FLD: 12.6 % — SIGNIFICANT CHANGE UP (ref 11.5–14.5)
SODIUM SERPL-SCNC: 139 MMOL/L — SIGNIFICANT CHANGE UP (ref 135–146)
VANCOMYCIN TROUGH SERPL-MCNC: 16.2 UG/ML — HIGH (ref 5–10)
WBC # BLD: 4.76 K/UL — LOW (ref 4.8–10.8)
WBC # FLD AUTO: 4.76 K/UL — LOW (ref 4.8–10.8)

## 2018-09-19 RX ORDER — DIPHENHYDRAMINE HCL 50 MG
50 CAPSULE ORAL ONCE
Qty: 0 | Refills: 0 | Status: COMPLETED | OUTPATIENT
Start: 2018-09-19 | End: 2018-09-19

## 2018-09-19 RX ORDER — OXYCODONE AND ACETAMINOPHEN 5; 325 MG/1; MG/1
1 TABLET ORAL EVERY 6 HOURS
Qty: 0 | Refills: 0 | Status: DISCONTINUED | OUTPATIENT
Start: 2018-09-19 | End: 2018-09-21

## 2018-09-19 RX ADMIN — Medication 0.5 MILLIGRAM(S): at 12:37

## 2018-09-19 RX ADMIN — Medication 166.67 MILLIGRAM(S): at 05:33

## 2018-09-19 RX ADMIN — BUDESONIDE AND FORMOTEROL FUMARATE DIHYDRATE 2 PUFF(S): 160; 4.5 AEROSOL RESPIRATORY (INHALATION) at 09:38

## 2018-09-19 RX ADMIN — CEFEPIME 100 MILLIGRAM(S): 1 INJECTION, POWDER, FOR SOLUTION INTRAMUSCULAR; INTRAVENOUS at 21:29

## 2018-09-19 RX ADMIN — OXYCODONE AND ACETAMINOPHEN 1 TABLET(S): 5; 325 TABLET ORAL at 12:48

## 2018-09-19 RX ADMIN — CEFEPIME 100 MILLIGRAM(S): 1 INJECTION, POWDER, FOR SOLUTION INTRAMUSCULAR; INTRAVENOUS at 17:10

## 2018-09-19 RX ADMIN — Medication 1 MILLIGRAM(S): at 21:30

## 2018-09-19 RX ADMIN — BUDESONIDE AND FORMOTEROL FUMARATE DIHYDRATE 2 PUFF(S): 160; 4.5 AEROSOL RESPIRATORY (INHALATION) at 21:29

## 2018-09-19 RX ADMIN — Medication 200 MICROGRAM(S): at 05:33

## 2018-09-19 RX ADMIN — Medication 50 MILLIGRAM(S): at 15:50

## 2018-09-19 RX ADMIN — CEFEPIME 100 MILLIGRAM(S): 1 INJECTION, POWDER, FOR SOLUTION INTRAMUSCULAR; INTRAVENOUS at 05:32

## 2018-09-19 RX ADMIN — LOSARTAN POTASSIUM 100 MILLIGRAM(S): 100 TABLET, FILM COATED ORAL at 05:33

## 2018-09-19 RX ADMIN — ENOXAPARIN SODIUM 40 MILLIGRAM(S): 100 INJECTION SUBCUTANEOUS at 12:35

## 2018-09-19 RX ADMIN — Medication 0.6 MILLIGRAM(S): at 12:35

## 2018-09-19 RX ADMIN — OXYCODONE AND ACETAMINOPHEN 1 TABLET(S): 5; 325 TABLET ORAL at 13:27

## 2018-09-19 RX ADMIN — Medication 166.67 MILLIGRAM(S): at 19:45

## 2018-09-19 NOTE — PROCEDURE NOTE - NSINFORMCONSENT_GEN_A_CORE
patient - with  phone/Benefits, risks, and possible complications of procedure explained to patient/caregiver who verbalized understanding and gave written consent.

## 2018-09-19 NOTE — PROGRESS NOTE ADULT - SUBJECTIVE AND OBJECTIVE BOX
ALDO DYKES  73y  Female      Patient is a 73y old  Female who presents with a chief complaint of left second toe infected ulcer (18 Sep 2018 11:33)      INTERVAL HPI/OVERNIGHT EVENTS: No new issues, left foot pain      REVIEW OF SYSTEMS:  CONSTITUTIONAL: No fever, weight loss, or fatigue  EYES: No eye pain, visual disturbances, or discharge  ENMT:  No difficulty hearing, tinnitus, vertigo; No sinus or throat pain  NECK: No pain or stiffness  BREASTS: No pain, masses, or nipple discharge  RESPIRATORY: No cough, wheezing, chills or hemoptysis; No shortness of breath  CARDIOVASCULAR: No chest pain, palpitations, dizziness, or leg swelling  GASTROINTESTINAL: No abdominal or epigastric pain. No nausea, vomiting, or hematemesis; No diarrhea or constipation. No melena or hematochezia.  GENITOURINARY: No dysuria, frequency, hematuria, or incontinence  NEUROLOGICAL: No headaches, memory loss, loss of strength, numbness, or tremors  SKIN: No itching, burning, rashes, or lesions   LYMPH NODES: No enlarged glands  ENDOCRINE: No heat or cold intolerance; No hair loss  MUSCULOSKELETAL: No joint pain or swelling; No muscle, back pain. Left foot/second toe pain  PSYCHIATRIC: No depression, anxiety, mood swings, or difficulty sleeping  HEME/LYMPH: No easy bruising, or bleeding gums  ALLERGY AND IMMUNOLOGIC: No hives or eczema    T(C): 36.5 (09-19-18 @ 04:46), Max: 36.5 (09-18-18 @ 14:14)  HR: 57 (09-19-18 @ 04:46) (51 - 59)  BP: 131/62 (09-19-18 @ 04:46) (106/53 - 131/62)  RR: 18 (09-19-18 @ 04:46) (18 - 20)  SpO2: --  Wt(kg): --Vital Signs Last 24 Hrs  T(C): 36.5 (19 Sep 2018 04:46), Max: 36.5 (18 Sep 2018 14:14)  T(F): 97.7 (19 Sep 2018 04:46), Max: 97.7 (18 Sep 2018 14:14)  HR: 57 (19 Sep 2018 04:46) (51 - 59)  BP: 131/62 (19 Sep 2018 04:46) (106/53 - 131/62)  BP(mean): --  RR: 18 (19 Sep 2018 04:46) (18 - 20)  SpO2: --    PHYSICAL EXAM:  GENERAL: NAD, well-groomed, well-developed  HEAD:  Atraumatic, Normocephalic  EYES: EOMI, PERRLA, conjunctiva and sclera clear  ENMT: No tonsillar erythema, exudates, or enlargement; Moist mucous membranes, Good dentition, No lesions  NECK: Supple, No JVD, Normal thyroid  NERVOUS SYSTEM:  Alert & Oriented X3, Good concentration; Motor Strength 5/5 B/L upper and lower extremities; DTRs 2+ intact and symmetric  CHEST/LUNG: Clear to percussion bilaterally; No rales, rhonchi, wheezing, or rubs  HEART: Regular rate and rhythm; No murmurs, rubs, or gallops  ABDOMEN: Soft, Nontender, Nondistended; Bowel sounds present  EXTREMITIES:  2+ Peripheral Pulses, No clubbing, cyanosis, or edema. Left foot dressed  LYMPH: No lymphadenopathy noted  SKIN: No rashes or lesions    Consultant(s) Notes Reviewed:  [x ] YES  [ ] NO    Discussed with Consultants/Other Providers [ x] YES     LABS                         13.2   4.74  )-----------( 237      ( 18 Sep 2018 07:54 )             38.2   09-18    141  |  104  |  13  ----------------------------<  101<H>  4.6   |  24  |  0.7    Ca    9.3      18 Sep 2018 07:54    TPro  7.4  /  Alb  4.5  /  TBili  0.4  /  DBili  x   /  AST  21  /  ALT  14  /  AlkPhos  91  09-17        RADIOLOGY & ADDITIONAL TESTS:    x rays left foot: Compatible with osteomyelitis/septic arthritis left second MTP joint    Imaging Personally Reviewed:  [ x] YES  [ ] NO    HEALTH ISSUES - PROBLEM Dx:  Ulcer of left foot, with fat layer exposed: Ulcer of left foot, with fat layer exposed

## 2018-09-19 NOTE — PROGRESS NOTE ADULT - SUBJECTIVE AND OBJECTIVE BOX
SUBJECTIVE:    Patient is a 73y old Female who presents with a chief complaint of left second toe infected ulcer (19 Sep 2018 06:28)    Stable, no acute events overnight. She had PICC line placed today.     PAST MEDICAL & SURGICAL HISTORY  Chronic obstructive pulmonary disease, unspecified COPD type  Asthma: no recent attacks  Osteoarthritis  Blood clot in vein: 5 y ago with treatment  Hypothyroid  HTN (hypertension)  Depression: no suicide ideation  Arthritis: OA  H/O hernia repair: may 25 2018  History of cholecystectomy  H/O tubal ligation  S/P tonsillectomy    SOCIAL HISTORY:  Negative for smoking/alcohol/drug use.     ALLERGIES:  penicillin (Hives)  penicillins (Rash; Flushing; Hives)    MEDICATIONS:  STANDING MEDICATIONS  buDESOnide  80 MICROgram(s)/formoterol 4.5 MICROgram(s) Inhaler 2 Puff(s) Inhalation two times a day  cefepime   IVPB      cefepime   IVPB 2000 milliGRAM(s) IV Intermittent every 8 hours  clonazePAM Tablet 0.5 milliGRAM(s) Oral daily  clonazePAM Tablet 1 milliGRAM(s) Oral at bedtime  colchicine 0.6 milliGRAM(s) Oral daily  enoxaparin Injectable 40 milliGRAM(s) SubCutaneous every 24 hours  influenza   Vaccine 0.5 milliLiter(s) IntraMuscular once  levothyroxine 200 MICROGram(s) Oral daily  losartan 100 milliGRAM(s) Oral daily  vancomycin  IVPB 1250 milliGRAM(s) IV Intermittent every 12 hours    PRN MEDICATIONS  ALBUTerol    90 MICROgram(s) HFA Inhaler 2 Puff(s) Inhalation every 6 hours PRN  oxyCODONE    5 mG/acetaminophen 325 mG 1 Tablet(s) Oral every 6 hours PRN    VITALS:   T(F): 97.7  HR: 60  BP: 131/62  RR: 18  SpO2: 93%    LABS:                        14.3   4.76  )-----------( 264      ( 19 Sep 2018 08:44 )             41.8     09-19    139  |  101  |  13  ----------------------------<  90  4.4   |  23  |  0.7    Ca    9.7      19 Sep 2018 08:44    TPro  7.4  /  Alb  4.5  /  TBili  0.4  /  DBili  x   /  AST  21  /  ALT  14  /  AlkPhos  91  09-17    PT/INR - ( 17 Sep 2018 16:48 )   PT: 12.30 sec;   INR: 1.14 ratio         PTT - ( 17 Sep 2018 16:48 )  PTT:28.0 sec          Culture - Blood (collected 17 Sep 2018 16:40)  Source: .Blood Blood  Preliminary Report (19 Sep 2018 01:02):    No growth to date.    Culture - Blood (collected 17 Sep 2018 16:40)  Source: .Blood Blood  Preliminary Report (19 Sep 2018 01:02):    No growth to date.              09-18-18 @ 07:01  -  09-19-18 @ 07:00  --------------------------------------------------------  IN: 120 mL / OUT: 0 mL / NET: 120 mL          PHYSICAL EXAM:  GEN: NAD, patient speaks Anguillan, family translates  LUNGS: CTAB  HEART: RRR  ABD: soft, NT/ND, +BS  EXT: no edema, PP b/l, left foot wrapped

## 2018-09-19 NOTE — PROGRESS NOTE ADULT - ASSESSMENT
74 y/o Congolese speaking FM w/ PMHX of  HTN, Gout, COPD, OA, hypothyroidism, depression, presenting with a several week history of worsening left second toe infection not responding PO Bactrim. Pt admitted to medicine to receive IV antibiotics.    #Left 2nd digit Infected Ulcer and Osteomyelitis  - Positive probe to bone, findings compatible with osteomyelitis seen on X-ray of foot  - on Vanc and Cefepime   - as per podiatry, no surgical intervention  - ESR is 8, CRP 0.20  - will f/u arterial duplex  - as there is no plan for surgery, patient had PICC line placed today and will need 6 weeks IV abx.  - will tailor abx regiment to wound cultures results and MRSA nares    #Questionable Hx of DVT as per son in the past - Pt was on AC for only 1 month but unsure of the medication  - will f/u venous duplex    #HTN - Controlled  - c/w losartan    #hypothyroidism   - c/w Synthroid    #COPD  - C/w Proair and Symbicort    #Gout  - c/w Colchicine    #Anxiety/Depression  - c/w Clonazepam    #Activity - OOBC  #Diet - DASh  #DVT/GI PPX - Lovenox and not indicated  #Code - full  #Dispo - from home

## 2018-09-19 NOTE — PROGRESS NOTE ADULT - ASSESSMENT
73F    HTN  Gout  COPD  7/26 hammer toe surgery    Admitted with L 2nd toe OM as +PTB and +xray  Sepsis ruled out on admission    - MRSA nasal PCR  - Podiatry following   - PICC and 6 weeks IV abx  - F/u wound culture  - Vanc/cefepime  - Please check vanc trough 30 min prior to 4th dose  - Pending MRSA PCR and wound cx will tailor longterm abx    Spectra [phone]

## 2018-09-19 NOTE — PROGRESS NOTE ADULT - SUBJECTIVE AND OBJECTIVE BOX
ALDO DYKES  73y, Female      OVERNIGHT EVENTS:  no acute events overnight  PICC placed    ROS negative except as per above    VITALS:  T(F): 97.7, Max: 97.7 (09-19-18 @ 04:46)  HR: 69  BP: 115/79  RR: 20Vital Signs Last 24 Hrs  T(C): 36.5 (19 Sep 2018 04:46), Max: 36.5 (19 Sep 2018 04:46)  T(F): 97.7 (19 Sep 2018 04:46), Max: 97.7 (19 Sep 2018 04:46)  HR: 69 (19 Sep 2018 13:52) (57 - 69)  BP: 115/79 (19 Sep 2018 13:52) (115/79 - 131/62)  BP(mean): --  RR: 20 (19 Sep 2018 13:52) (18 - 20)  SpO2: 93% (19 Sep 2018 09:25) (93% - 93%)    PHYSICAL EXAM:  Gen: Awake and alert, non-toxic appearing, NAD  HEENT: NCAT. EOMI. MMM.   CV: RRR, no murmurs  Lungs: CTAB  Abd: Soft. NTND  Extr: L foot 2nd toe with swelling, erythema and ulcer, no purulence  Skin: no rash  Neuro: No focal deficits  Lines: clean    TESTS & MEASUREMENTS:                        14.3   4.76  )-----------( 264      ( 19 Sep 2018 08:44 )             41.8     09-19    139  |  101  |  13  ----------------------------<  90  4.4   |  23  |  0.7    Ca    9.7      19 Sep 2018 08:44    TPro  7.4  /  Alb  4.5  /  TBili  0.4  /  DBili  x   /  AST  21  /  ALT  14  /  AlkPhos  91  09-17    LIVER FUNCTIONS - ( 17 Sep 2018 16:27 )  Alb: 4.5 g/dL / Pro: 7.4 g/dL / ALK PHOS: 91 U/L / ALT: 14 U/L / AST: 21 U/L / GGT: x             Culture - Blood (collected 09-17-18 @ 16:40)  Source: .Blood Blood  Preliminary Report (09-19-18 @ 01:02):    No growth to date.    Culture - Blood (collected 09-17-18 @ 16:40)  Source: .Blood Blood  Preliminary Report (09-19-18 @ 01:02):    No growth to date.          RADIOLOGY & ADDITIONAL TESTS:    ANTIBIOTICS:    cefepime   IVPB   100 mL/Hr IV Intermittent (09-18-18 @ 00:39)    cefepime   IVPB   100 mL/Hr IV Intermittent (09-19-18 @ 05:32)   100 mL/Hr IV Intermittent (09-18-18 @ 21:18)   100 mL/Hr IV Intermittent (09-18-18 @ 15:08)   100 mL/Hr IV Intermittent (09-18-18 @ 06:58)    vancomycin  IVPB   166.67 mL/Hr IV Intermittent (09-19-18 @ 05:33)   166.67 mL/Hr IV Intermittent (09-18-18 @ 17:10)   166.67 mL/Hr IV Intermittent (09-18-18 @ 06:59)    vancomycin  IVPB   250 mL/Hr IV Intermittent (09-17-18 @ 17:38)        cefepime   IVPB      cefepime   IVPB 2000 milliGRAM(s) IV Intermittent every 8 hours  vancomycin  IVPB 1250 milliGRAM(s) IV Intermittent every 12 hours

## 2018-09-19 NOTE — PROGRESS NOTE ADULT - ASSESSMENT
1. Infected left second toe ulcer/osteomyelitis/septic arthritis. PICC line for 6 weeks of IV antibiotics. Thereafter, disposition per Podiatry/ID. Arterial doppler lower extremities as ordered  2. HTN: Stable. Antihypertensives as ordered  3. Hypothyroidism: Stable. Levothyroxine as ordered

## 2018-09-19 NOTE — PROCEDURE NOTE - NSPROCDETAILS_GEN_ALL_CORE
sterile technique, catheter placed/ultrasound guidance/location identified, draped/prepped, sterile technique used/sterile dressing applied/supine position

## 2018-09-20 ENCOUNTER — TRANSCRIPTION ENCOUNTER (OUTPATIENT)
Age: 73
End: 2018-09-20

## 2018-09-20 LAB
-  AMPICILLIN/SULBACTAM: SIGNIFICANT CHANGE UP
-  CEFAZOLIN: SIGNIFICANT CHANGE UP
-  CLINDAMYCIN: SIGNIFICANT CHANGE UP
-  DAPTOMYCIN: SIGNIFICANT CHANGE UP
-  ERYTHROMYCIN: SIGNIFICANT CHANGE UP
-  GENTAMICIN: SIGNIFICANT CHANGE UP
-  LINEZOLID: SIGNIFICANT CHANGE UP
-  OXACILLIN: SIGNIFICANT CHANGE UP
-  PENICILLIN: SIGNIFICANT CHANGE UP
-  RIFAMPIN: SIGNIFICANT CHANGE UP
-  TETRACYCLINE: SIGNIFICANT CHANGE UP
-  TRIMETHOPRIM/SULFAMETHOXAZOLE: SIGNIFICANT CHANGE UP
-  VANCOMYCIN: SIGNIFICANT CHANGE UP
ANION GAP SERPL CALC-SCNC: 15 MMOL/L — HIGH (ref 7–14)
BUN SERPL-MCNC: 13 MG/DL — SIGNIFICANT CHANGE UP (ref 10–20)
CALCIUM SERPL-MCNC: 9.4 MG/DL — SIGNIFICANT CHANGE UP (ref 8.5–10.1)
CHLORIDE SERPL-SCNC: 98 MMOL/L — SIGNIFICANT CHANGE UP (ref 98–110)
CO2 SERPL-SCNC: 25 MMOL/L — SIGNIFICANT CHANGE UP (ref 17–32)
CREAT SERPL-MCNC: 0.8 MG/DL — SIGNIFICANT CHANGE UP (ref 0.7–1.5)
CULTURE RESULTS: SIGNIFICANT CHANGE UP
GLUCOSE SERPL-MCNC: 115 MG/DL — HIGH (ref 70–99)
HCT VFR BLD CALC: 40.7 % — SIGNIFICANT CHANGE UP (ref 37–47)
HGB BLD-MCNC: 14 G/DL — SIGNIFICANT CHANGE UP (ref 12–16)
MCHC RBC-ENTMCNC: 30 PG — SIGNIFICANT CHANGE UP (ref 27–31)
MCHC RBC-ENTMCNC: 34.4 G/DL — SIGNIFICANT CHANGE UP (ref 32–37)
MCV RBC AUTO: 87.2 FL — SIGNIFICANT CHANGE UP (ref 81–99)
METHOD TYPE: SIGNIFICANT CHANGE UP
NRBC # BLD: 0 /100 WBCS — SIGNIFICANT CHANGE UP (ref 0–0)
ORGANISM # SPEC MICROSCOPIC CNT: SIGNIFICANT CHANGE UP
ORGANISM # SPEC MICROSCOPIC CNT: SIGNIFICANT CHANGE UP
PLATELET # BLD AUTO: 259 K/UL — SIGNIFICANT CHANGE UP (ref 130–400)
POTASSIUM SERPL-MCNC: 4.8 MMOL/L — SIGNIFICANT CHANGE UP (ref 3.5–5)
POTASSIUM SERPL-SCNC: 4.8 MMOL/L — SIGNIFICANT CHANGE UP (ref 3.5–5)
RBC # BLD: 4.67 M/UL — SIGNIFICANT CHANGE UP (ref 4.2–5.4)
RBC # FLD: 12.7 % — SIGNIFICANT CHANGE UP (ref 11.5–14.5)
SODIUM SERPL-SCNC: 138 MMOL/L — SIGNIFICANT CHANGE UP (ref 135–146)
SPECIMEN SOURCE: SIGNIFICANT CHANGE UP
WBC # BLD: 5.57 K/UL — SIGNIFICANT CHANGE UP (ref 4.8–10.8)
WBC # FLD AUTO: 5.57 K/UL — SIGNIFICANT CHANGE UP (ref 4.8–10.8)

## 2018-09-20 RX ORDER — CADEXOMER IODINE 0.9 %
1 PADS, MEDICATED (EA) TOPICAL DAILY
Qty: 0 | Refills: 0 | Status: DISCONTINUED | OUTPATIENT
Start: 2018-09-20 | End: 2018-09-21

## 2018-09-20 RX ORDER — VANCOMYCIN HCL 1 G
1250 VIAL (EA) INTRAVENOUS EVERY 12 HOURS
Qty: 0 | Refills: 0 | Status: DISCONTINUED | OUTPATIENT
Start: 2018-09-20 | End: 2018-09-20

## 2018-09-20 RX ORDER — CHLORHEXIDINE GLUCONATE 213 G/1000ML
1 SOLUTION TOPICAL
Qty: 0 | Refills: 0 | Status: DISCONTINUED | OUTPATIENT
Start: 2018-09-20 | End: 2018-09-21

## 2018-09-20 RX ORDER — CEFTRIAXONE 500 MG/1
2 INJECTION, POWDER, FOR SOLUTION INTRAMUSCULAR; INTRAVENOUS EVERY 24 HOURS
Qty: 0 | Refills: 0 | Status: DISCONTINUED | OUTPATIENT
Start: 2018-09-21 | End: 2018-09-21

## 2018-09-20 RX ORDER — CEFEPIME 1 G/1
1000 INJECTION, POWDER, FOR SOLUTION INTRAMUSCULAR; INTRAVENOUS EVERY 8 HOURS
Qty: 0 | Refills: 0 | Status: DISCONTINUED | OUTPATIENT
Start: 2018-09-20 | End: 2018-09-20

## 2018-09-20 RX ADMIN — BUDESONIDE AND FORMOTEROL FUMARATE DIHYDRATE 2 PUFF(S): 160; 4.5 AEROSOL RESPIRATORY (INHALATION) at 07:40

## 2018-09-20 RX ADMIN — Medication 166.67 MILLIGRAM(S): at 05:59

## 2018-09-20 RX ADMIN — BUDESONIDE AND FORMOTEROL FUMARATE DIHYDRATE 2 PUFF(S): 160; 4.5 AEROSOL RESPIRATORY (INHALATION) at 21:05

## 2018-09-20 RX ADMIN — Medication 0.6 MILLIGRAM(S): at 11:05

## 2018-09-20 RX ADMIN — OXYCODONE AND ACETAMINOPHEN 1 TABLET(S): 5; 325 TABLET ORAL at 02:32

## 2018-09-20 RX ADMIN — Medication 200 MICROGRAM(S): at 05:03

## 2018-09-20 RX ADMIN — Medication 1 MILLIGRAM(S): at 21:03

## 2018-09-20 RX ADMIN — ENOXAPARIN SODIUM 40 MILLIGRAM(S): 100 INJECTION SUBCUTANEOUS at 11:06

## 2018-09-20 RX ADMIN — Medication 0.5 MILLIGRAM(S): at 11:05

## 2018-09-20 RX ADMIN — LOSARTAN POTASSIUM 100 MILLIGRAM(S): 100 TABLET, FILM COATED ORAL at 05:03

## 2018-09-20 RX ADMIN — Medication 166.67 MILLIGRAM(S): at 18:25

## 2018-09-20 RX ADMIN — CEFEPIME 100 MILLIGRAM(S): 1 INJECTION, POWDER, FOR SOLUTION INTRAMUSCULAR; INTRAVENOUS at 21:05

## 2018-09-20 RX ADMIN — CEFEPIME 100 MILLIGRAM(S): 1 INJECTION, POWDER, FOR SOLUTION INTRAMUSCULAR; INTRAVENOUS at 13:48

## 2018-09-20 RX ADMIN — OXYCODONE AND ACETAMINOPHEN 1 TABLET(S): 5; 325 TABLET ORAL at 15:27

## 2018-09-20 RX ADMIN — OXYCODONE AND ACETAMINOPHEN 1 TABLET(S): 5; 325 TABLET ORAL at 23:58

## 2018-09-20 RX ADMIN — OXYCODONE AND ACETAMINOPHEN 1 TABLET(S): 5; 325 TABLET ORAL at 14:32

## 2018-09-20 RX ADMIN — CEFEPIME 100 MILLIGRAM(S): 1 INJECTION, POWDER, FOR SOLUTION INTRAMUSCULAR; INTRAVENOUS at 05:03

## 2018-09-20 NOTE — DISCHARGE NOTE ADULT - PROVIDER TOKENS
TOKEN:'[id number]',FREE:[LAST:[Liset],FIRST:[Glenroy],PHONE:[phone]],FAX:[(   [phone]    -],ADDRESS:[[address] [place] [address] [address] [address]]],TOKEN:'[id number]'

## 2018-09-20 NOTE — PROGRESS NOTE ADULT - SUBJECTIVE AND OBJECTIVE BOX
SUBJECTIVE:    Patient is a 73y old Female who presents with a chief complaint of left second toe infected ulcer (20 Sep 2018 15:18)    Stable, no acute events.    PAST MEDICAL & SURGICAL HISTORY  Chronic obstructive pulmonary disease, unspecified COPD type  Asthma: no recent attacks  Osteoarthritis  Blood clot in vein: 5 y ago with treatment  Hypothyroid  HTN (hypertension)  Depression: no suicide ideation  Arthritis: OA  H/O hernia repair: may 25 2018  History of cholecystectomy  H/O tubal ligation  S/P tonsillectomy    SOCIAL HISTORY:  Negative for smoking/alcohol/drug use.     ALLERGIES:  penicillin (Hives)  penicillins (Rash; Flushing; Hives)    MEDICATIONS:  STANDING MEDICATIONS  buDESOnide  80 MICROgram(s)/formoterol 4.5 MICROgram(s) Inhaler 2 Puff(s) Inhalation two times a day  cadexomer iodine 0.9% Gel 1 Application(s) Topical daily  chlorhexidine 4% Liquid 1 Application(s) Topical <User Schedule>  clonazePAM Tablet 0.5 milliGRAM(s) Oral daily  clonazePAM Tablet 1 milliGRAM(s) Oral at bedtime  colchicine 0.6 milliGRAM(s) Oral daily  doxycycline hyclate Capsule 50 milliGRAM(s) Oral every 12 hours  enoxaparin Injectable 40 milliGRAM(s) SubCutaneous every 24 hours  influenza   Vaccine 0.5 milliLiter(s) IntraMuscular once  levothyroxine 200 MICROGram(s) Oral daily  losartan 100 milliGRAM(s) Oral daily    PRN MEDICATIONS  ALBUTerol    90 MICROgram(s) HFA Inhaler 2 Puff(s) Inhalation every 6 hours PRN  oxyCODONE    5 mG/acetaminophen 325 mG 1 Tablet(s) Oral every 6 hours PRN    VITALS:   T(F): 97.7  HR: 62  BP: 143/68  RR: 18  SpO2: 99%    LABS:                        14.0   5.57  )-----------( 259      ( 20 Sep 2018 09:11 )             40.7     09-20    138  |  98  |  13  ----------------------------<  115<H>  4.8   |  25  |  0.8    Ca    9.4      20 Sep 2018 09:11                Culture - Other (collected 18 Sep 2018 14:28)  Source: .Other Left 2nd toe  Final Report (20 Sep 2018 18:07):    Moderate Methicillin resistant Staphylococcus aureus    Moderate Candida albicans  Organism: Methicillin resistant Staphylococcus aureus (20 Sep 2018 18:07)  Organism: Methicillin resistant Staphylococcus aureus (20 Sep 2018 18:07)              09-20-18 @ 07:01  -  09-20-18 @ 21:43  --------------------------------------------------------  IN: 300 mL / OUT: 0 mL / NET: 300 mL          PHYSICAL EXAM:  GEN: NAD, comfortable  LUNGS: CTAB, no w/r/r  HEART: RRR, no m/r/g  ABD: soft, NT/ND, +BS  EXT: no edema, PP b/l  NEURO: AAOx3 SUBJECTIVE:    Patient is a 73y old Female who presents with a chief complaint of left second toe infected ulcer (20 Sep 2018 15:18)    Stable, no acute events.    PAST MEDICAL & SURGICAL HISTORY  Chronic obstructive pulmonary disease, unspecified COPD type  Asthma: no recent attacks  Osteoarthritis  Blood clot in vein: 5 y ago with treatment  Hypothyroid  HTN (hypertension)  Depression: no suicide ideation  Arthritis: OA  H/O hernia repair: may 25 2018  History of cholecystectomy  H/O tubal ligation  S/P tonsillectomy    SOCIAL HISTORY:  Negative for smoking/alcohol/drug use.     ALLERGIES:  penicillin (Hives)  penicillins (Rash; Flushing; Hives)    MEDICATIONS:  STANDING MEDICATIONS  buDESOnide  80 MICROgram(s)/formoterol 4.5 MICROgram(s) Inhaler 2 Puff(s) Inhalation two times a day  cadexomer iodine 0.9% Gel 1 Application(s) Topical daily  chlorhexidine 4% Liquid 1 Application(s) Topical <User Schedule>  clonazePAM Tablet 0.5 milliGRAM(s) Oral daily  clonazePAM Tablet 1 milliGRAM(s) Oral at bedtime  colchicine 0.6 milliGRAM(s) Oral daily  doxycycline hyclate Capsule 50 milliGRAM(s) Oral every 12 hours  enoxaparin Injectable 40 milliGRAM(s) SubCutaneous every 24 hours  influenza   Vaccine 0.5 milliLiter(s) IntraMuscular once  levothyroxine 200 MICROGram(s) Oral daily  losartan 100 milliGRAM(s) Oral daily    PRN MEDICATIONS  ALBUTerol    90 MICROgram(s) HFA Inhaler 2 Puff(s) Inhalation every 6 hours PRN  oxyCODONE    5 mG/acetaminophen 325 mG 1 Tablet(s) Oral every 6 hours PRN    VITALS:   T(F): 97.7  HR: 62  BP: 143/68  RR: 18  SpO2: 99%    LABS:                        14.0   5.57  )-----------( 259      ( 20 Sep 2018 09:11 )             40.7     09-20    138  |  98  |  13  ----------------------------<  115<H>  4.8   |  25  |  0.8    Ca    9.4      20 Sep 2018 09:11                Culture - Other (collected 18 Sep 2018 14:28)  Source: .Other Left 2nd toe  Final Report (20 Sep 2018 18:07):    Moderate Methicillin resistant Staphylococcus aureus    Moderate Candida albicans  Organism: Methicillin resistant Staphylococcus aureus (20 Sep 2018 18:07)  Organism: Methicillin resistant Staphylococcus aureus (20 Sep 2018 18:07)              09-20-18 @ 07:01  -  09-20-18 @ 21:43  --------------------------------------------------------  IN: 300 mL / OUT: 0 mL / NET: 300 mL          PHYSICAL EXAM:  GEN: NAD  LUNGS: CTAB  HEART: RRR  ABD: soft, +BS  EXT: no edema, left foot wrapped  NEURO: AAOx3

## 2018-09-20 NOTE — DISCHARGE NOTE ADULT - MEDICATION SUMMARY - MEDICATIONS TO TAKE
I will START or STAY ON the medications listed below when I get home from the hospital:    losartan 100 mg oral tablet  -- 1 tab(s) by mouth once a day  -- Indication: For Blood pressure    clonazePAM  -- 0.5 milligram(s) by mouth once a day  -- Indication: For Anxiety    clonazePAM 1 mg oral tablet  -- 1 tab(s) by mouth once a day (at bedtime)  -- Indication: For Anxiety    colchicine 0.6 mg oral tablet  -- 1 tab(s) by mouth once a day  -- Indication: For Gout    Dulera 100 mcg-5 mcg/inh inhalation aerosol  -- 2 puff(s) inhaled 2 times a day  -- Indication: For COPD    ProAir HFA 90 mcg/inh inhalation aerosol  -- 2 puff(s) inhaled every 6 hours, As Needed  -- Indication: For COPD    vancomycin  -- 1250 milligram(s) intravenous every 12 hours  -- Indication: For Cellutis infection    levothyroxine 200 mcg (0.2 mg) oral tablet  -- 1 tab(s) by mouth once a day  -- Indication: For Hypothyroidism

## 2018-09-20 NOTE — PROGRESS NOTE ADULT - ASSESSMENT
1. Left second toe infected ulcer/osteomyelitis: IV antibiotics per ID. PICC line for 6 weeks antibiotics. Nasal culture for MRSA. Discharge/disposition per ID/Podiatry. Local wound care per Podiatry  2. HTN: Antihypertensives as ordered  3. Hypothyroidism: Levothyroxine as ordered

## 2018-09-20 NOTE — DISCHARGE NOTE ADULT - CARE PROVIDER_API CALL
Tatyana Reynoso), Internal Medicine  [address] [place] [address]  Phone: [phone]  Fax: [phone]    Glenroy Hutchins  [address] [place] [address] [address], [place] [address]  Phone: [phone]  Fax: (   )    -    Nghia Roy ([email or web address]), Surgery  [facility] [place] [address]  Phone: [phone]  Fax: [phone]

## 2018-09-20 NOTE — PROGRESS NOTE ADULT - ASSESSMENT
#Left 2nd digit Infected Ulcer and Osteomyelitis  - Positive probe to bone, findings compatible with osteomyelitis seen on X-ray of foot  - on Vanc and Cefepime   - as per podiatry, no surgical intervention  - ESR is 8, CRP 0.20  - will f/u arterial duplex  - as there is no plan for surgery, patient had PICC line placed today and will need 6 weeks IV abx.  - will tailor abx regiment to wound cultures results and MRSA nares    #Questionable Hx of DVT as per son in the past - Pt was on AC for only 1 month but unsure of the medication  - will f/u venous duplex    #HTN - Controlled  - c/w losartan    #hypothyroidism   - c/w Synthroid    #COPD  - C/w Proair and Symbicort    #Gout  - c/w Colchicine    #Anxiety/Depression  - c/w Clonazepam    #Activity - OOBC  #Diet - DASh  #DVT/GI PPX - Lovenox and not indicated  #Code - full  #Dispo - from home #Left 2nd digit Infected Ulcer and Osteomyelitis  - Positive probe to bone, findings compatible with osteomyelitis seen on X-ray of foot  - will change to ceftriaxone and doxycycline in anticipation of discharge tomorrow  - wound culture shows MRSA  - as per podiatry, no surgical intervention  - ESR is 8, CRP 0.20  - as there is no plan for surgery, patient had PICC line placed today and will need 6 weeks IV abx.  - no significant stenosis seen of arterial duplex    #Questionable Hx of DVT as per son in the past - Pt was on AC for only 1 month but unsure of the medication  - negative venous duplex    #HTN - Controlled  - c/w losartan    #hypothyroidism   - c/w Synthroid    #COPD  - C/w Proair and Symbicort    #Gout  - c/w Colchicine    #Anxiety/Depression  - c/w Clonazepam    #Activity - OOBC  #Diet - DASh  #DVT/GI PPX - Lovenox and not indicated  #Code - full  #Dispo - patient will be discharged to home tomorrow and family with manage care of antibiotics and PICC line - have been educated on care    Patient is Burmese only speaker - refused  phone

## 2018-09-20 NOTE — DISCHARGE NOTE ADULT - PLAN OF CARE
prevent complications of disease continue antibiotics as instructed and follow-up with your primary doctor, podiatrist, and infectious disease specialist for further evaluation prevent complications there were no blood clots found in your legs on vascular studies; please follow-up with your primary doctor for further evaluation Continue antibiotics as instructed and follow-up with your primary doctor, podiatrist, and infectious disease specialist for further follow up of your treatment. The antibiotic you will be taking is Vancomycin 1250 mg every 12 hours for 6 weeks. You will stop taking the antibiotics on November 2nd, 2018. You will need to follow up with the infectious disease specialist Dr. Reynoso next week so please call her office to make an appointment, her number is [phone]. You will need to get weekly blood work to monitor you antibiotic level and treatment. Please also follow up with Dr. Roy, your podiatrist. There were no blood clots found in your legs on vascular studies; please follow-up with your primary doctor for further evaluation.

## 2018-09-20 NOTE — PROGRESS NOTE ADULT - SUBJECTIVE AND OBJECTIVE BOX
ALDO DYKES  73y, Female      OVERNIGHT EVENTS:  no acute events overnight  mrsa nasal pcr neg  wcx with s. aureus, candida    ROS negative except as per above    VITALS:  T(F): 97.9, Max: 98.6 (09-19-18 @ 20:14)  HR: 56  BP: 147/70  RR: 20Vital Signs Last 24 Hrs  T(C): 36.6 (20 Sep 2018 14:04), Max: 37 (19 Sep 2018 20:14)  T(F): 97.9 (20 Sep 2018 14:04), Max: 98.6 (19 Sep 2018 20:14)  HR: 56 (20 Sep 2018 14:04) (56 - 66)  BP: 147/70 (20 Sep 2018 14:04) (121/74 - 154/83)  BP(mean): --  RR: 20 (20 Sep 2018 14:04) (18 - 20)  SpO2: 92% (20 Sep 2018 05:32) (92% - 99%)    PHYSICAL EXAM:  Gen: Awake and alert, non-toxic appearing, NAD  HEENT: NCAT. EOMI. MMM.   CV: RRR, no murmurs  Lungs: CTAB  Abd: Soft. NTND  Extr: L foot 2nd toe with swelling, decreased erythema and ulcer, no purulence  Skin: no rash  Neuro: No focal deficits  Lines: clean    TESTS & MEASUREMENTS:                        14.0   5.57  )-----------( 259      ( 20 Sep 2018 09:11 )             40.7     09-20    138  |  98  |  13  ----------------------------<  115<H>  4.8   |  25  |  0.8    Ca    9.4      20 Sep 2018 09:11          Culture - Other (collected 09-18-18 @ 14:28)  Source: .Other Left 2nd toe  Preliminary Report (09-19-18 @ 22:16):    Moderate Staphylococcus aureus    Moderate Candida albicans    Culture - Blood (collected 09-17-18 @ 16:40)  Source: .Blood Blood  Preliminary Report (09-19-18 @ 01:02):    No growth to date.    Culture - Blood (collected 09-17-18 @ 16:40)  Source: .Blood Blood  Preliminary Report (09-19-18 @ 01:02):    No growth to date.          RADIOLOGY & ADDITIONAL TESTS:    ANTIBIOTICS:    cefepime   IVPB   100 mL/Hr IV Intermittent (09-18-18 @ 00:39)    cefepime   IVPB   100 mL/Hr IV Intermittent (09-20-18 @ 13:48)   100 mL/Hr IV Intermittent (09-20-18 @ 05:03)   100 mL/Hr IV Intermittent (09-19-18 @ 21:29)   100 mL/Hr IV Intermittent (09-19-18 @ 17:10)   100 mL/Hr IV Intermittent (09-19-18 @ 05:32)   100 mL/Hr IV Intermittent (09-18-18 @ 21:18)   100 mL/Hr IV Intermittent (09-18-18 @ 15:08)   100 mL/Hr IV Intermittent (09-18-18 @ 06:58)    vancomycin  IVPB   166.67 mL/Hr IV Intermittent (09-20-18 @ 05:59)   166.67 mL/Hr IV Intermittent (09-19-18 @ 19:45)   166.67 mL/Hr IV Intermittent (09-19-18 @ 05:33)   166.67 mL/Hr IV Intermittent (09-18-18 @ 17:10)   166.67 mL/Hr IV Intermittent (09-18-18 @ 06:59)    vancomycin  IVPB   250 mL/Hr IV Intermittent (09-17-18 @ 17:38)

## 2018-09-20 NOTE — PROGRESS NOTE ADULT - ASSESSMENT
73F    HTN  Gout  COPD  7/26 hammer toe surgery    Admitted with L 2nd toe OM as +PTB and +xray  Sepsis ruled out on admission    wcx with s. aureus and candida; candida likely contaminant      - PICC and 6 weeks IV abx Ceftriaxone 2g q24h IV  - Weekly CBC/ CMP  - Doxy 100mg PO BID until s. aureus speciates- will f/u as outpatient, if MRSA will continue, if MSSA will D/C  - ID follow-up [address] [phone]      Spectra [phone]

## 2018-09-20 NOTE — DISCHARGE NOTE ADULT - HOSPITAL COURSE
72 y/o Irish speaking FM w/ PMHX of  HTN, Gout, COPD, OA, hypothyroidism, depression, presenting with a several week history of worsening left second toe infection not responding PO Bactrim. Positive probe to bone, findings compatible with osteomyelitis seen on X-ray of foot. Pt admitted to medicine to receive IV antibiotics.  She was evaluated by podiatry who recommended no acute surgical intervention and continue local wound care.  She was also evaluated by infectious disease who recommended long-term IV and oral antibiotics.  A PICC was inserted by interventional radiology.  The patient will be discharged home and was advised to follow-up with her primary doctor, infectious disease, and podiatry. 74 y/o Belizean speaking FM w/ PMHX of  HTN, Gout, COPD, OA, hypothyroidism, depression, presenting with a several week history of worsening left second toe infection not responding PO Bactrim. Positive probe to bone, findings compatible with osteomyelitis seen on X-ray of foot. Pt admitted to medicine to receive IV antibiotics.  She was evaluated by podiatry who recommended no acute surgical intervention and continue local wound care.  She was also evaluated by infectious disease who recommended long-term IV and oral antibiotics.  A PICC was inserted by interventional radiology.  The patient will be discharged home and was advised to follow-up with her primary doctor, infectious disease, and podiatry. Will be on 1250 vancomycin Q12h for 6 weeks. 72 y/o Palauan speaking FM w/ PMHX of  HTN, Gout, COPD, OA, hypothyroidism, depression, presenting with a several week history of worsening left second toe infection not responding PO Bactrim. Positive probe to bone, findings compatible with osteomyelitis seen on X-ray of foot. Pt admitted to medicine to receive IV antibiotics.  She was evaluated by podiatry who recommended no acute surgical intervention and continue local wound care.  She was also evaluated by infectious disease who recommended long-term IV and oral antibiotics.  A PICC was inserted by interventional radiology.  The patient will be discharged home and was advised to follow-up with her primary doctor, infectious disease, and podiatry. Will be on 1250 vancomycin Q12h for 6 weeks. ID, podiatry are ok for patient to be discharged. Patient is now stable for discharge.

## 2018-09-20 NOTE — DISCHARGE NOTE ADULT - CARE PLAN
Principal Discharge DX:	Osteomyelitis of toe of left foot  Goal:	prevent complications of disease  Assessment and plan of treatment:	continue antibiotics as instructed and follow-up with your primary doctor, podiatrist, and infectious disease specialist for further evaluation  Secondary Diagnosis:	Blood clot in vein  Goal:	prevent complications  Assessment and plan of treatment:	there were no blood clots found in your legs on vascular studies; please follow-up with your primary doctor for further evaluation Principal Discharge DX:	Osteomyelitis of toe of left foot  Goal:	prevent complications of disease  Assessment and plan of treatment:	Continue antibiotics as instructed and follow-up with your primary doctor, podiatrist, and infectious disease specialist for further follow up of your treatment. The antibiotic you will be taking is Vancomycin 1250 mg every 12 hours for 6 weeks. You will stop taking the antibiotics on November 2nd, 2018. You will need to follow up with the infectious disease specialist Dr. Reynoso next week so please call her office to make an appointment, her number is [phone]. You will need to get weekly blood work to monitor you antibiotic level and treatment. Please also follow up with Dr. Roy, your podiatrist.  Secondary Diagnosis:	Blood clot in vein  Goal:	prevent complications  Assessment and plan of treatment:	There were no blood clots found in your legs on vascular studies; please follow-up with your primary doctor for further evaluation.

## 2018-09-20 NOTE — DISCHARGE NOTE ADULT - PHYSICIAN SECTION COMPLETE
Patient will come to Internal Medicine office to : prescription.   Patient was advised of location and hours: Yes.   Patient was advised to bring photo identification: Yes.   Patient elects another party to  item: no.     Yes

## 2018-09-20 NOTE — PROGRESS NOTE ADULT - SUBJECTIVE AND OBJECTIVE BOX
ALDO DYKES  73y  Female      Patient is a 73y old  Female who presents with a chief complaint of left second toe infected ulcer (19 Sep 2018 14:41)      INTERVAL HPI/OVERNIGHT EVENTS: No new issues      REVIEW OF SYSTEMS:  CONSTITUTIONAL: No fever, weight loss, or fatigue  EYES: No eye pain, visual disturbances, or discharge  ENMT:  No difficulty hearing, tinnitus, vertigo; No sinus or throat pain  NECK: No pain or stiffness  BREASTS: No pain, masses, or nipple discharge  RESPIRATORY: No cough, wheezing, chills or hemoptysis; No shortness of breath  CARDIOVASCULAR: No chest pain, palpitations, dizziness, or leg swelling  GASTROINTESTINAL: No abdominal or epigastric pain. No nausea, vomiting, or hematemesis; No diarrhea or constipation. No melena or hematochezia.  GENITOURINARY: No dysuria, frequency, hematuria, or incontinence  NEUROLOGICAL: No headaches, memory loss, loss of strength, numbness, or tremors  SKIN: No itching, burning, rashes, or lesions   LYMPH NODES: No enlarged glands  ENDOCRINE: No heat or cold intolerance; No hair loss  MUSCULOSKELETAL: No joint pain or swelling; Left foot pain  PSYCHIATRIC: No depression, anxiety, mood swings, or difficulty sleeping  HEME/LYMPH: No easy bruising, or bleeding gums  ALLERGY AND IMMUNOLOGIC: No hives or eczema    T(C): 36.3 (09-20-18 @ 05:32), Max: 37 (09-19-18 @ 20:14)  HR: 66 (09-20-18 @ 05:32) (60 - 69)  BP: 121/74 (09-20-18 @ 05:32) (115/79 - 154/83)  RR: 18 (09-20-18 @ 05:32) (18 - 20)  SpO2: 92% (09-20-18 @ 05:32) (92% - 99%)  Wt(kg): --Vital Signs Last 24 Hrs  T(C): 36.3 (20 Sep 2018 05:32), Max: 37 (19 Sep 2018 20:14)  T(F): 97.4 (20 Sep 2018 05:32), Max: 98.6 (19 Sep 2018 20:14)  HR: 66 (20 Sep 2018 05:32) (60 - 69)  BP: 121/74 (20 Sep 2018 05:32) (115/79 - 154/83)  BP(mean): --  RR: 18 (20 Sep 2018 05:32) (18 - 20)  SpO2: 92% (20 Sep 2018 05:32) (92% - 99%)    PHYSICAL EXAM:  GENERAL: NAD, well-groomed, well-developed  HEAD:  Atraumatic, Normocephalic  EYES: EOMI, PERRLA, conjunctiva and sclera clear  ENMT: No tonsillar erythema, exudates, or enlargement; Moist mucous membranes, Good dentition, No lesions  NECK: Supple, No JVD, Normal thyroid  NERVOUS SYSTEM:  Alert & Oriented X3, Good concentration; Motor Strength 5/5 B/L upper and lower extremities; DTRs 2+ intact and symmetric  CHEST/LUNG: Clear to percussion bilaterally; No rales, rhonchi, wheezing, or rubs  HEART: Regular rate and rhythm; No murmurs, rubs, or gallops  ABDOMEN: Soft, Nontender, Nondistended; Bowel sounds present  EXTREMITIES:  2+ Peripheral Pulses, No clubbing, cyanosis, or edema. Ulcer/swelling/erythema left second toe with visible bone, tender to palpation  LYMPH: No lymphadenopathy noted  SKIN: No rashes or lesions    Consultant(s) Notes Reviewed:  [x ] YES  [ ] NO    Discussed with Consultants/Other Providers [ x] YES     LABS:                        14.3   4.76  )-----------( 264      ( 19 Sep 2018 08:44 )             41.8   09-19    139  |  101  |  13  ----------------------------<  90  4.4   |  23  |  0.7    Ca    9.7      19 Sep 2018 08:44    Blood cultures: Negative to date    RADIOLOGY & ADDITIONAL TESTS:    Imaging Personally Reviewed:  [ ] YES  [ ] NO  MEDICATIONS  (STANDING):  buDESOnide  80 MICROgram(s)/formoterol 4.5 MICROgram(s) Inhaler 2 Puff(s) Inhalation two times a day  cefepime   IVPB      cefepime   IVPB 2000 milliGRAM(s) IV Intermittent every 8 hours  chlorhexidine 4% Liquid 1 Application(s) Topical <User Schedule>  clonazePAM Tablet 0.5 milliGRAM(s) Oral daily  clonazePAM Tablet 1 milliGRAM(s) Oral at bedtime  colchicine 0.6 milliGRAM(s) Oral daily  enoxaparin Injectable 40 milliGRAM(s) SubCutaneous every 24 hours  influenza   Vaccine 0.5 milliLiter(s) IntraMuscular once  levothyroxine 200 MICROGram(s) Oral daily  losartan 100 milliGRAM(s) Oral daily  vancomycin  IVPB 1250 milliGRAM(s) IV Intermittent every 12 hours    MEDICATIONS  (PRN):  ALBUTerol    90 MICROgram(s) HFA Inhaler 2 Puff(s) Inhalation every 6 hours PRN Shortness of Breath and/or Wheezing  oxyCODONE    5 mG/acetaminophen 325 mG 1 Tablet(s) Oral every 6 hours PRN Moderate Pain (4 - 6)    HEALTH ISSUES - PROBLEM Dx:  Ulcer of left foot, with fat layer exposed: Ulcer of left foot, with fat layer exposed

## 2018-09-21 VITALS — HEART RATE: 75 BPM | SYSTOLIC BLOOD PRESSURE: 137 MMHG | DIASTOLIC BLOOD PRESSURE: 73 MMHG | TEMPERATURE: 98 F

## 2018-09-21 LAB
ANION GAP SERPL CALC-SCNC: 12 MMOL/L — SIGNIFICANT CHANGE UP (ref 7–14)
BUN SERPL-MCNC: 11 MG/DL — SIGNIFICANT CHANGE UP (ref 10–20)
CALCIUM SERPL-MCNC: 9 MG/DL — SIGNIFICANT CHANGE UP (ref 8.5–10.1)
CHLORIDE SERPL-SCNC: 102 MMOL/L — SIGNIFICANT CHANGE UP (ref 98–110)
CO2 SERPL-SCNC: 25 MMOL/L — SIGNIFICANT CHANGE UP (ref 17–32)
CREAT SERPL-MCNC: 0.7 MG/DL — SIGNIFICANT CHANGE UP (ref 0.7–1.5)
GLUCOSE SERPL-MCNC: 98 MG/DL — SIGNIFICANT CHANGE UP (ref 70–99)
HCT VFR BLD CALC: 37.9 % — SIGNIFICANT CHANGE UP (ref 37–47)
HGB BLD-MCNC: 12.9 G/DL — SIGNIFICANT CHANGE UP (ref 12–16)
MCHC RBC-ENTMCNC: 29.6 PG — SIGNIFICANT CHANGE UP (ref 27–31)
MCHC RBC-ENTMCNC: 34 G/DL — SIGNIFICANT CHANGE UP (ref 32–37)
MCV RBC AUTO: 86.9 FL — SIGNIFICANT CHANGE UP (ref 81–99)
NRBC # BLD: 0 /100 WBCS — SIGNIFICANT CHANGE UP (ref 0–0)
PLATELET # BLD AUTO: 209 K/UL — SIGNIFICANT CHANGE UP (ref 130–400)
POTASSIUM SERPL-MCNC: 4.4 MMOL/L — SIGNIFICANT CHANGE UP (ref 3.5–5)
POTASSIUM SERPL-SCNC: 4.4 MMOL/L — SIGNIFICANT CHANGE UP (ref 3.5–5)
RBC # BLD: 4.36 M/UL — SIGNIFICANT CHANGE UP (ref 4.2–5.4)
RBC # FLD: 12.7 % — SIGNIFICANT CHANGE UP (ref 11.5–14.5)
SODIUM SERPL-SCNC: 139 MMOL/L — SIGNIFICANT CHANGE UP (ref 135–146)
WBC # BLD: 4.42 K/UL — LOW (ref 4.8–10.8)
WBC # FLD AUTO: 4.42 K/UL — LOW (ref 4.8–10.8)

## 2018-09-21 RX ORDER — VANCOMYCIN HCL 1 G
1250 VIAL (EA) INTRAVENOUS EVERY 12 HOURS
Qty: 0 | Refills: 0 | Status: DISCONTINUED | OUTPATIENT
Start: 2018-09-21 | End: 2018-09-21

## 2018-09-21 RX ORDER — DOCUSATE SODIUM 100 MG
100 CAPSULE ORAL
Qty: 0 | Refills: 0 | Status: DISCONTINUED | OUTPATIENT
Start: 2018-09-21 | End: 2018-09-21

## 2018-09-21 RX ORDER — VANCOMYCIN HCL 1 G
1250 VIAL (EA) INTRAVENOUS
Qty: 0 | Refills: 0 | COMMUNITY
Start: 2018-09-21

## 2018-09-21 RX ADMIN — Medication 166.67 MILLIGRAM(S): at 16:11

## 2018-09-21 RX ADMIN — ENOXAPARIN SODIUM 40 MILLIGRAM(S): 100 INJECTION SUBCUTANEOUS at 10:56

## 2018-09-21 RX ADMIN — Medication 30 MILLILITER(S): at 10:55

## 2018-09-21 RX ADMIN — Medication 100 MILLIGRAM(S): at 10:55

## 2018-09-21 RX ADMIN — Medication 0.6 MILLIGRAM(S): at 11:11

## 2018-09-21 RX ADMIN — Medication 100 MILLIGRAM(S): at 05:07

## 2018-09-21 RX ADMIN — Medication 0.5 MILLIGRAM(S): at 11:14

## 2018-09-21 RX ADMIN — OXYCODONE AND ACETAMINOPHEN 1 TABLET(S): 5; 325 TABLET ORAL at 00:30

## 2018-09-21 RX ADMIN — Medication 200 MICROGRAM(S): at 05:07

## 2018-09-21 RX ADMIN — Medication 1 APPLICATION(S): at 11:09

## 2018-09-21 RX ADMIN — BUDESONIDE AND FORMOTEROL FUMARATE DIHYDRATE 2 PUFF(S): 160; 4.5 AEROSOL RESPIRATORY (INHALATION) at 08:27

## 2018-09-21 RX ADMIN — Medication 100 MILLIGRAM(S): at 17:07

## 2018-09-21 RX ADMIN — OXYCODONE AND ACETAMINOPHEN 1 TABLET(S): 5; 325 TABLET ORAL at 18:25

## 2018-09-21 RX ADMIN — CEFTRIAXONE 100 GRAM(S): 500 INJECTION, POWDER, FOR SOLUTION INTRAMUSCULAR; INTRAVENOUS at 06:04

## 2018-09-21 RX ADMIN — OXYCODONE AND ACETAMINOPHEN 1 TABLET(S): 5; 325 TABLET ORAL at 18:26

## 2018-09-21 NOTE — PROGRESS NOTE ADULT - ASSESSMENT
73F    HTN  Gout  COPD  7/26 hammer toe surgery    Admitted with L 2nd toe OM as +PTB and +xray  Sepsis ruled out on admission    wcx with MRSA    - Change home PICC abx to Vanc 1.25 q12h  - Weekly CBC/CMP and Vanc trough  - ID follow-up [id number] [phone]      Spectra [id number]

## 2018-09-21 NOTE — PROGRESS NOTE ADULT - ATTENDING COMMENTS
Prognosis guarded
Prognosis guarded
MRSA infection to ulceration 2nd toe left with cellulitis  patient to be d/c home on vanco for 6 weeks  Visiting nurse for local care

## 2018-09-21 NOTE — PROGRESS NOTE ADULT - SUBJECTIVE AND OBJECTIVE BOX
ALDO DYKES  73y, Female      OVERNIGHT EVENTS:  wound cx with MRSA  vanc trough 16.2    ROS negative except as per above    VITALS:  T(F): 96.1, Max: 97.9 (09-20-18 @ 14:04)  HR: 52  BP: 102/54  RR: 18Vital Signs Last 24 Hrs  T(C): 35.6 (21 Sep 2018 05:03), Max: 36.6 (20 Sep 2018 14:04)  T(F): 96.1 (21 Sep 2018 05:03), Max: 97.9 (20 Sep 2018 14:04)  HR: 52 (21 Sep 2018 05:03) (52 - 62)  BP: 102/54 (21 Sep 2018 05:03) (102/54 - 147/70)  BP(mean): --  RR: 18 (21 Sep 2018 05:03) (18 - 20)  SpO2: 99% (20 Sep 2018 20:16) (99% - 99%)    PHYSICAL EXAM:  Gen: Awake and alert, non-toxic appearing, NAD  HEENT: NCAT. EOMI. MMM.   CV: RRR, no murmurs  Lungs: CTAB  Abd: Soft. NTND  Extr: L foot 2nd toe with swelling, decreased erythema and ulcer, no purulence  Skin: no rash  Neuro: No focal deficits  Lines: clean    TESTS & MEASUREMENTS:                        14.0   5.57  )-----------( 259      ( 20 Sep 2018 09:11 )             40.7     09-20    138  |  98  |  13  ----------------------------<  115<H>  4.8   |  25  |  0.8    Ca    9.4      20 Sep 2018 09:11          Culture - Other (collected 09-18-18 @ 14:28)  Source: .Other Left 2nd toe  Final Report (09-20-18 @ 18:07):    Moderate Methicillin resistant Staphylococcus aureus    Moderate Candida albicans  Organism: Methicillin resistant Staphylococcus aureus (09-20-18 @ 18:07)  Organism: Methicillin resistant Staphylococcus aureus (09-20-18 @ 18:07)      -  Ampicillin/Sulbactam: R 16/8      -  Cefazolin: R >16      -  Clindamycin: R >4      -  Daptomycin: S 0.5      -  Erythromycin: R >4      -  Gentamicin: R >8 Should not be used as monotherapy      -  Linezolid: S 2      -  Oxacillin: R >2      -  Penicillin: R >8      -  RIF- Rifampin: S <=1 Should not be used as monotherapy      -  Tetra/Doxy: S <=1      -  Trimethoprim/Sulfamethoxazole: S <=0.5/9.5      -  Vancomycin: S 2      Method Type: JAIME    Culture - Blood (collected 09-17-18 @ 16:40)  Source: .Blood Blood  Preliminary Report (09-19-18 @ 01:02):    No growth to date.    Culture - Blood (collected 09-17-18 @ 16:40)  Source: .Blood Blood  Preliminary Report (09-19-18 @ 01:02):    No growth to date.          RADIOLOGY & ADDITIONAL TESTS:    ANTIBIOTICS:  cefepime   IVPB   100 mL/Hr IV Intermittent (09-20-18 @ 21:05)    cefepime   IVPB   100 mL/Hr IV Intermittent (09-18-18 @ 00:39)    cefepime   IVPB   100 mL/Hr IV Intermittent (09-20-18 @ 13:48)   100 mL/Hr IV Intermittent (09-20-18 @ 05:03)   100 mL/Hr IV Intermittent (09-19-18 @ 21:29)   100 mL/Hr IV Intermittent (09-19-18 @ 17:10)   100 mL/Hr IV Intermittent (09-19-18 @ 05:32)   100 mL/Hr IV Intermittent (09-18-18 @ 21:18)   100 mL/Hr IV Intermittent (09-18-18 @ 15:08)   100 mL/Hr IV Intermittent (09-18-18 @ 06:58)    cefTRIAXone   IVPB   100 mL/Hr IV Intermittent (09-21-18 @ 06:04)    doxycycline hyclate Capsule   100 milliGRAM(s) Oral (09-21-18 @ 05:07)    vancomycin  IVPB   166.67 mL/Hr IV Intermittent (09-20-18 @ 05:59)   166.67 mL/Hr IV Intermittent (09-19-18 @ 19:45)   166.67 mL/Hr IV Intermittent (09-19-18 @ 05:33)   166.67 mL/Hr IV Intermittent (09-18-18 @ 17:10)   166.67 mL/Hr IV Intermittent (09-18-18 @ 06:59)    vancomycin  IVPB   250 mL/Hr IV Intermittent (09-17-18 @ 17:38)    vancomycin  IVPB   166.67 mL/Hr IV Intermittent (09-20-18 @ 18:25)        cefTRIAXone   IVPB 2 Gram(s) IV Intermittent every 24 hours  doxycycline hyclate Capsule 100 milliGRAM(s) Oral every 12 hours

## 2018-09-21 NOTE — PROGRESS NOTE ADULT - PROVIDER SPECIALTY LIST ADULT
Infectious Disease
Internal Medicine
Podiatry
Internal Medicine

## 2018-09-21 NOTE — PROGRESS NOTE ADULT - SUBJECTIVE AND OBJECTIVE BOX
PROGRESS NOTE   Patient is a 73y old  Female who presents with a chief complaint of left second toe infected ulcer (21 Sep 2018 07:42)      HPI:  72 y/o Ukrainian speaking FM w/ PMHX of  HTN, Gout, COPD, OA, hypothyroidism, depression, presenting with a several week history of worsening left second toe infection. Pt's son Mr. Alford was at bedside and provided translation. Pt follows w/ Dr. olivarez as outpt from podiatry for hx of ulcers on her left foot. Pt had hammar toes repaired on 7/26 on her 2nd and 3rd left toe. Weeks after discharge pt began developing an ulcer that progressively got worse where it began ooze pus. She was prescribed 10 day course of bactrim by Dr. Olivarez for 10 days but only took for 8 days since the wound was not healing. She was sent in by Dr. Olivarez for IV antibiotics. Pt states the ulcer got deep enough to see the bone. Currently denies N/V/D, fever, chills, sob, dizziness, confusion, chest pain, or syncope.     Pt is able to bear weight on the leg and walks w/ darco shoes. She lives at home w/ her sons and has no needs (17 Sep 2018 20:32)      Vital Signs Last 24 Hrs  T(C): 36.6 (21 Sep 2018 13:29), Max: 36.6 (21 Sep 2018 13:29)  T(F): 97.8 (21 Sep 2018 13:29), Max: 97.8 (21 Sep 2018 13:29)  HR: 75 (21 Sep 2018 13:29) (52 - 75)  BP: 137/73 (21 Sep 2018 13:29) (102/54 - 143/68)  BP(mean): --  RR: 18 (21 Sep 2018 05:03) (18 - 18)  SpO2: 99% (20 Sep 2018 20:16) (99% - 99%)                          12.9   4.42  )-----------( 209      ( 21 Sep 2018 07:40 )             37.9               09-21    139  |  102  |  11  ----------------------------<  98  4.4   |  25  |  0.7    Ca    9.0      21 Sep 2018 07:40        PHYSICAL EXAM  GEN: VALEROSOSA, ALDO is a pleasant well-nourished, well developed 73y Female in no acute distress, alert awake, and oriented to person, place and time.   LE Focused:    stable ulcer left second toe  mild edema  serous drainage

## 2018-09-21 NOTE — PROGRESS NOTE ADULT - NSHPATTENDINGPLANDISCUSS_GEN_ALL_CORE
Floor resident
Patient, Primary care physician
Patient/Primary care attending
patient and son

## 2018-09-21 NOTE — PROGRESS NOTE ADULT - REASON FOR ADMISSION
left second toe infected ulcer

## 2018-09-25 DIAGNOSIS — J44.9 CHRONIC OBSTRUCTIVE PULMONARY DISEASE, UNSPECIFIED: ICD-10-CM

## 2018-09-25 DIAGNOSIS — M10.9 GOUT, UNSPECIFIED: ICD-10-CM

## 2018-09-25 DIAGNOSIS — I10 ESSENTIAL (PRIMARY) HYPERTENSION: ICD-10-CM

## 2018-09-25 DIAGNOSIS — Z86.718 PERSONAL HISTORY OF OTHER VENOUS THROMBOSIS AND EMBOLISM: ICD-10-CM

## 2018-09-25 DIAGNOSIS — M79.675 PAIN IN LEFT TOE(S): ICD-10-CM

## 2018-09-25 DIAGNOSIS — Z88.0 ALLERGY STATUS TO PENICILLIN: ICD-10-CM

## 2018-09-25 DIAGNOSIS — Z28.21 IMMUNIZATION NOT CARRIED OUT BECAUSE OF PATIENT REFUSAL: ICD-10-CM

## 2018-09-25 DIAGNOSIS — M00.9 PYOGENIC ARTHRITIS, UNSPECIFIED: ICD-10-CM

## 2018-09-25 DIAGNOSIS — M86.8X7 OTHER OSTEOMYELITIS, ANKLE AND FOOT: ICD-10-CM

## 2018-09-25 DIAGNOSIS — B95.62 METHICILLIN RESISTANT STAPHYLOCOCCUS AUREUS INFECTION AS THE CAUSE OF DISEASES CLASSIFIED ELSEWHERE: ICD-10-CM

## 2018-09-25 DIAGNOSIS — L03.032 CELLULITIS OF LEFT TOE: ICD-10-CM

## 2018-09-25 DIAGNOSIS — F41.9 ANXIETY DISORDER, UNSPECIFIED: ICD-10-CM

## 2018-09-25 DIAGNOSIS — M19.90 UNSPECIFIED OSTEOARTHRITIS, UNSPECIFIED SITE: ICD-10-CM

## 2018-09-25 DIAGNOSIS — E03.9 HYPOTHYROIDISM, UNSPECIFIED: ICD-10-CM

## 2018-09-25 DIAGNOSIS — F32.9 MAJOR DEPRESSIVE DISORDER, SINGLE EPISODE, UNSPECIFIED: ICD-10-CM

## 2018-09-25 DIAGNOSIS — L97.529 NON-PRESSURE CHRONIC ULCER OF OTHER PART OF LEFT FOOT WITH UNSPECIFIED SEVERITY: ICD-10-CM

## 2018-09-25 DIAGNOSIS — Z79.51 LONG TERM (CURRENT) USE OF INHALED STEROIDS: ICD-10-CM

## 2018-12-14 ENCOUNTER — TRANSCRIPTION ENCOUNTER (OUTPATIENT)
Age: 73
End: 2018-12-14

## 2019-03-19 ENCOUNTER — INPATIENT (INPATIENT)
Facility: HOSPITAL | Age: 74
LOS: 2 days | Discharge: HOME | End: 2019-03-22
Attending: HOSPITALIST | Admitting: HOSPITALIST

## 2019-03-19 VITALS — SYSTOLIC BLOOD PRESSURE: 182 MMHG | RESPIRATION RATE: 19 BRPM | DIASTOLIC BLOOD PRESSURE: 95 MMHG | TEMPERATURE: 99 F

## 2019-03-19 DIAGNOSIS — Z98.890 OTHER SPECIFIED POSTPROCEDURAL STATES: Chronic | ICD-10-CM

## 2019-03-19 DIAGNOSIS — Z98.51 TUBAL LIGATION STATUS: Chronic | ICD-10-CM

## 2019-03-19 DIAGNOSIS — Z90.89 ACQUIRED ABSENCE OF OTHER ORGANS: Chronic | ICD-10-CM

## 2019-03-19 DIAGNOSIS — Z90.49 ACQUIRED ABSENCE OF OTHER SPECIFIED PARTS OF DIGESTIVE TRACT: Chronic | ICD-10-CM

## 2019-03-19 LAB
ALBUMIN SERPL ELPH-MCNC: 4.5 G/DL — SIGNIFICANT CHANGE UP (ref 3.5–5.2)
ALP SERPL-CCNC: 78 U/L — SIGNIFICANT CHANGE UP (ref 30–115)
ALT FLD-CCNC: 15 U/L — SIGNIFICANT CHANGE UP (ref 0–41)
ANION GAP SERPL CALC-SCNC: 11 MMOL/L — SIGNIFICANT CHANGE UP (ref 7–14)
AST SERPL-CCNC: 50 U/L — HIGH (ref 0–41)
BASOPHILS # BLD AUTO: 0.03 K/UL — SIGNIFICANT CHANGE UP (ref 0–0.2)
BASOPHILS NFR BLD AUTO: 0.5 % — SIGNIFICANT CHANGE UP (ref 0–1)
BILIRUB SERPL-MCNC: 0.5 MG/DL — SIGNIFICANT CHANGE UP (ref 0.2–1.2)
BUN SERPL-MCNC: 12 MG/DL — SIGNIFICANT CHANGE UP (ref 10–20)
CALCIUM SERPL-MCNC: 9.3 MG/DL — SIGNIFICANT CHANGE UP (ref 8.5–10.1)
CHLORIDE SERPL-SCNC: 100 MMOL/L — SIGNIFICANT CHANGE UP (ref 98–110)
CO2 SERPL-SCNC: 25 MMOL/L — SIGNIFICANT CHANGE UP (ref 17–32)
CREAT SERPL-MCNC: 0.9 MG/DL — SIGNIFICANT CHANGE UP (ref 0.7–1.5)
EOSINOPHIL # BLD AUTO: 0.11 K/UL — SIGNIFICANT CHANGE UP (ref 0–0.7)
EOSINOPHIL NFR BLD AUTO: 1.8 % — SIGNIFICANT CHANGE UP (ref 0–8)
GLUCOSE SERPL-MCNC: 97 MG/DL — SIGNIFICANT CHANGE UP (ref 70–99)
HCT VFR BLD CALC: 41.6 % — SIGNIFICANT CHANGE UP (ref 37–47)
HGB BLD-MCNC: 14.5 G/DL — SIGNIFICANT CHANGE UP (ref 12–16)
IMM GRANULOCYTES NFR BLD AUTO: 0.3 % — SIGNIFICANT CHANGE UP (ref 0.1–0.3)
LACTATE SERPL-SCNC: 0.8 MMOL/L — SIGNIFICANT CHANGE UP (ref 0.5–2.2)
LYMPHOCYTES # BLD AUTO: 1.42 K/UL — SIGNIFICANT CHANGE UP (ref 1.2–3.4)
LYMPHOCYTES # BLD AUTO: 22.6 % — SIGNIFICANT CHANGE UP (ref 20.5–51.1)
MCHC RBC-ENTMCNC: 31.7 PG — HIGH (ref 27–31)
MCHC RBC-ENTMCNC: 34.9 G/DL — SIGNIFICANT CHANGE UP (ref 32–37)
MCV RBC AUTO: 91 FL — SIGNIFICANT CHANGE UP (ref 81–99)
MONOCYTES # BLD AUTO: 0.54 K/UL — SIGNIFICANT CHANGE UP (ref 0.1–0.6)
MONOCYTES NFR BLD AUTO: 8.6 % — SIGNIFICANT CHANGE UP (ref 1.7–9.3)
NEUTROPHILS # BLD AUTO: 4.15 K/UL — SIGNIFICANT CHANGE UP (ref 1.4–6.5)
NEUTROPHILS NFR BLD AUTO: 66.2 % — SIGNIFICANT CHANGE UP (ref 42.2–75.2)
NRBC # BLD: 0 /100 WBCS — SIGNIFICANT CHANGE UP (ref 0–0)
PLATELET # BLD AUTO: 278 K/UL — SIGNIFICANT CHANGE UP (ref 130–400)
POTASSIUM SERPL-MCNC: 6.2 MMOL/L — CRITICAL HIGH (ref 3.5–5)
POTASSIUM SERPL-SCNC: 6.2 MMOL/L — CRITICAL HIGH (ref 3.5–5)
PROT SERPL-MCNC: 7.4 G/DL — SIGNIFICANT CHANGE UP (ref 6–8)
RBC # BLD: 4.57 M/UL — SIGNIFICANT CHANGE UP (ref 4.2–5.4)
RBC # FLD: 12.8 % — SIGNIFICANT CHANGE UP (ref 11.5–14.5)
SODIUM SERPL-SCNC: 136 MMOL/L — SIGNIFICANT CHANGE UP (ref 135–146)
WBC # BLD: 6.27 K/UL — SIGNIFICANT CHANGE UP (ref 4.8–10.8)
WBC # FLD AUTO: 6.27 K/UL — SIGNIFICANT CHANGE UP (ref 4.8–10.8)

## 2019-03-19 RX ORDER — VANCOMYCIN HCL 1 G
1000 VIAL (EA) INTRAVENOUS ONCE
Qty: 0 | Refills: 0 | Status: COMPLETED | OUTPATIENT
Start: 2019-03-19 | End: 2019-03-19

## 2019-03-19 RX ORDER — LEVOTHYROXINE SODIUM 125 MCG
200 TABLET ORAL DAILY
Qty: 0 | Refills: 0 | Status: DISCONTINUED | OUTPATIENT
Start: 2019-03-19 | End: 2019-03-22

## 2019-03-19 RX ORDER — ALBUTEROL 90 UG/1
2 AEROSOL, METERED ORAL EVERY 6 HOURS
Qty: 0 | Refills: 0 | Status: DISCONTINUED | OUTPATIENT
Start: 2019-03-19 | End: 2019-03-22

## 2019-03-19 RX ORDER — HEPARIN SODIUM 5000 [USP'U]/ML
5000 INJECTION INTRAVENOUS; SUBCUTANEOUS EVERY 12 HOURS
Qty: 0 | Refills: 0 | Status: DISCONTINUED | OUTPATIENT
Start: 2019-03-19 | End: 2019-03-22

## 2019-03-19 RX ORDER — PANTOPRAZOLE SODIUM 20 MG/1
40 TABLET, DELAYED RELEASE ORAL
Qty: 0 | Refills: 0 | Status: DISCONTINUED | OUTPATIENT
Start: 2019-03-19 | End: 2019-03-19

## 2019-03-19 RX ORDER — CLONAZEPAM 1 MG
0.5 TABLET ORAL DAILY
Qty: 0 | Refills: 0 | Status: DISCONTINUED | OUTPATIENT
Start: 2019-03-19 | End: 2019-03-19

## 2019-03-19 RX ORDER — VANCOMYCIN HCL 1 G
1000 VIAL (EA) INTRAVENOUS
Qty: 0 | Refills: 0 | Status: DISCONTINUED | OUTPATIENT
Start: 2019-03-19 | End: 2019-03-22

## 2019-03-19 RX ORDER — PANTOPRAZOLE SODIUM 20 MG/1
40 TABLET, DELAYED RELEASE ORAL
Qty: 0 | Refills: 0 | Status: DISCONTINUED | OUTPATIENT
Start: 2019-03-19 | End: 2019-03-22

## 2019-03-19 RX ORDER — HEPARIN SODIUM 5000 [USP'U]/ML
5000 INJECTION INTRAVENOUS; SUBCUTANEOUS EVERY 12 HOURS
Qty: 0 | Refills: 0 | Status: DISCONTINUED | OUTPATIENT
Start: 2019-03-19 | End: 2019-03-19

## 2019-03-19 RX ORDER — COLCHICINE 0.6 MG
0.6 TABLET ORAL DAILY
Qty: 0 | Refills: 0 | Status: DISCONTINUED | OUTPATIENT
Start: 2019-03-19 | End: 2019-03-22

## 2019-03-19 RX ORDER — CLONAZEPAM 1 MG
0.5 TABLET ORAL THREE TIMES A DAY
Qty: 0 | Refills: 0 | Status: DISCONTINUED | OUTPATIENT
Start: 2019-03-19 | End: 2019-03-22

## 2019-03-19 RX ORDER — INFLUENZA VIRUS VACCINE 15; 15; 15; 15 UG/.5ML; UG/.5ML; UG/.5ML; UG/.5ML
0.5 SUSPENSION INTRAMUSCULAR ONCE
Qty: 0 | Refills: 0 | Status: COMPLETED | OUTPATIENT
Start: 2019-03-19 | End: 2019-03-19

## 2019-03-19 RX ORDER — ACETAMINOPHEN 500 MG
650 TABLET ORAL EVERY 6 HOURS
Qty: 0 | Refills: 0 | Status: DISCONTINUED | OUTPATIENT
Start: 2019-03-19 | End: 2019-03-22

## 2019-03-19 RX ORDER — LOSARTAN POTASSIUM 100 MG/1
100 TABLET, FILM COATED ORAL DAILY
Qty: 0 | Refills: 0 | Status: DISCONTINUED | OUTPATIENT
Start: 2019-03-19 | End: 2019-03-22

## 2019-03-19 RX ORDER — BUDESONIDE AND FORMOTEROL FUMARATE DIHYDRATE 160; 4.5 UG/1; UG/1
2 AEROSOL RESPIRATORY (INHALATION)
Qty: 0 | Refills: 0 | Status: DISCONTINUED | OUTPATIENT
Start: 2019-03-19 | End: 2019-03-22

## 2019-03-19 RX ADMIN — Medication 250 MILLIGRAM(S): at 19:27

## 2019-03-19 RX ADMIN — Medication 0.5 MILLIGRAM(S): at 22:28

## 2019-03-19 RX ADMIN — Medication 650 MILLIGRAM(S): at 22:28

## 2019-03-19 RX ADMIN — BUDESONIDE AND FORMOTEROL FUMARATE DIHYDRATE 2 PUFF(S): 160; 4.5 AEROSOL RESPIRATORY (INHALATION) at 22:30

## 2019-03-19 NOTE — H&P ADULT - ASSESSMENT
Patient is a 73y old  Female who presents with a chief complaint of pain, swelling    left foot with  ulceration on the 2nd toe---progressive few days duration (19 Mar 2019 20:15)                                                                                                                                                                                                                                                                                       HEALTH ISSUES - PROBLEM Dx: Patient is a 73y old  Female who presents with a chief complaint of pain, swelling    left foot with  ulceration on the 2nd toe---progressive few days duration (19 Mar 2019 20:15)                                                        1  foot ulcer/osteomyelitis lt foot-                               antibiotics/podiatry/i d consults                                                                                 2  htn                                                                         antihypertensives/salt restriction/  wt control                                                       hypothyroidism                                                         Hormone   supple ments  HEALTH ISSUES - PROBLEM Dx:       Gout                                                                         continue current  meds                                                      obesity                                                                      wt reduction                                                               copd                                                                         bronchodialators

## 2019-03-19 NOTE — H&P ADULT - NSICDXFAMILYHX_GEN_ALL_CORE_FT
FAMILY HISTORY:  Father  Still living? No  Family history of peripheral vascular disease, Age at diagnosis: Age Unknown

## 2019-03-19 NOTE — ED PROVIDER NOTE - PHYSICAL EXAMINATION
skin :(+)swelling and ulceration left 2nd toe with erythema dorsal foot and lower leg , no crepitation      Vital Signs: I have reviewed the initial vital signs.  Constitutional: well-nourished, no acute distress, normocephalic  Eyes: PERRLA, EOMI,   ENT: MMM,  Cardiovascular: regular rate, regular rhythm,   Respiratory: unlabored respiratory effort, clear to auscultation bilaterally  Gastrointestinal: soft, non-tender, non-distended  abdomen,  Musculoskeletal: supple neck, no lower extremity edema,  Neurologic: awake, alert, cranial nerves II-XII grossly intact, extremities’ motor and sensory functions grossly intact, no focal deficits,  Psychiatric: appropriate mood, appropriate affect

## 2019-03-19 NOTE — ED PROVIDER NOTE - OBJECTIVE STATEMENT
74 y/o female with hx of NIDDM , HTN presents c/o left foot swelling , ulcer to left 2nd toe and redness to lower leg worsening x 1 week. patient without any trauma or falls. patient seen by podiatrist today and sent to the ED for admission . patient without any fever,chills. patient with pain to left calf . patient without vomiting , cp, sob.

## 2019-03-19 NOTE — H&P ADULT - NSHPLABSRESULTS_GEN_ALL_CORE
14.5   6.27  )-----------( 278      ( 19 Mar 2019 19:25 )             41.6     03-19    136  |  100  |  12  ----------------------------<  97  6.2<HH>   |  25  |  0.9    Ca    9.3      19 Mar 2019 19:25    TPro  7.4  /  Alb  4.5  /  TBili  0.5  /  DBili  x   /  AST  50<H>  /  ALT  15  /  AlkPhos  78  03-19              Lactate Trend  03-19 @ 19:25 Lactate:0.8         CAPILLARY BLOOD GLUCOSE

## 2019-03-19 NOTE — PATIENT PROFILE ADULT - FLU SEASON?
Alert-The patient is alert, awake and responds to voice. The patient is oriented to time, place, and person. The triage nurse is able to obtain subjective information. Yes...

## 2019-03-19 NOTE — ED ADULT NURSE NOTE - NSIMPLEMENTINTERV_GEN_ALL_ED
Implemented All Universal Safety Interventions:  [place] to call system. Call bell, personal items and telephone within reach. Instruct patient to call for assistance. Room bathroom lighting operational. Non-slip footwear when patient is off stretcher. Physically safe environment: no spills, clutter or unnecessary equipment. Stretcher in lowest position, wheels locked, appropriate side rails in place.

## 2019-03-19 NOTE — ED PROVIDER NOTE - CARE PLAN
Principal Discharge DX:	Osteomyelitis of foot, left, acute  Secondary Diagnosis:	Diabetic foot ulcer

## 2019-03-19 NOTE — H&P ADULT - NSICDXPASTMEDICALHX_GEN_ALL_CORE_FT
PAST MEDICAL HISTORY:  Arthritis OA    Asthma no recent attacks    Blood clot in vein 5 y ago with treatment    Chronic obstructive pulmonary disease, unspecified COPD type     Depression no suicide ideation    HTN (hypertension)     Hypothyroid     Osteoarthritis

## 2019-03-19 NOTE — H&P ADULT - EXTREMITIES COMMENTS
feft foot is diffusely swollen with , tender and  with  increased warmth  and redness feft foot is diffusely swollen with , tender and  with  increased warmth  and redness, 2nd toe with open woundat the base of the  nale

## 2019-03-19 NOTE — ED PROVIDER NOTE - ATTENDING CONTRIBUTION TO CARE
Pt is a 72yo female who comes in for L 2nd toe DFU, followed by Dr. Roy.  No fever, no other complaints.  Redness streaking up LLE.    Exam: no calf tenderness, soft NT abdomen, L 2nd toe wth ?drainage, tender, 2+ DP pulse  Imp: DFU  Plan: labs, cx, iv abx, admission

## 2019-03-19 NOTE — ED PROVIDER NOTE - NS ED ROS FT
Review of Systems    Constitutional: (-) fever/ chills (-) weight loss  Eyes/ENT: (-) blurry vision, (-) epistaxis (-) sore throat (-) ear pain  Cardiovascular: (-) chest pain, (-) syncope (-) palpitations  Respiratory: (-) cough, (-) shortness of breath  Gastrointestinal: (-) vomiting, (-) diarrhea (-) abdominal pain  Musculoskeletal: (-) neck pain, (-) back pain, (-) joint pain (-) pedal edema   Integumentary: +redness to left foot   Neurological: (-) headache, (-) altered mental status  Psychiatric: (-) hallucinations or depression   Allergic/Immunologic: (-) pruritus  heme: no lymphangitis left leg

## 2019-03-19 NOTE — H&P ADULT - NSICDXPASTSURGICALHX_GEN_ALL_CORE_FT
PAST SURGICAL HISTORY:  H/O hernia repair may 25 2018    H/O tubal ligation     History of cholecystectomy     S/P tonsillectomy

## 2019-03-19 NOTE — H&P ADULT - HISTORY OF PRESENT ILLNESS
· HPI Objective Statement: 72 y/o female with hx of NIDDM , HTN presents c/o left foot swelling , ulcer to left 2nd toe and redness to lower leg worsening x 1 week. patient without any trauma or falls. patient seen by podiatrist today and sent to the ED for admission . patient without any fever,chills. patient with pain to left calf . patient without vomiting , cp, sob.

## 2019-03-19 NOTE — H&P ADULT - NSHPREVIEWOFSYSTEMS_GEN_ALL_CORE
Review of Systems:  · Review of Systems: Review of Systems    	Constitutional: (-) fever/ chills (-) weight loss  	Eyes/ENT: (-) blurry vision, (-) epistaxis (-) sore throat (-) ear pain  	Cardiovascular: (-) chest pain, (-) syncope (-) palpitations  	Respiratory: (-) cough, (-) shortness of breath  	Gastrointestinal: (-) vomiting, (-) diarrhea (-) abdominal pain  	Musculoskeletal: (-) neck pain, (-) back pain, (-) joint pain (-) pedal edema   	Integumentary: +redness to left foot   	Neurological: (-) headache, (-) altered mental status  	Psychiatric: (-) hallucinations or depression   	Allergic/Immunologic: (-) pruritus  heme: no lymphangitis left leg

## 2019-03-20 DIAGNOSIS — L97.524 NON-PRESSURE CHRONIC ULCER OF OTHER PART OF LEFT FOOT WITH NECROSIS OF BONE: ICD-10-CM

## 2019-03-20 LAB
ANION GAP SERPL CALC-SCNC: 11 MMOL/L — SIGNIFICANT CHANGE UP (ref 7–14)
BUN SERPL-MCNC: 8 MG/DL — LOW (ref 10–20)
CALCIUM SERPL-MCNC: 8.9 MG/DL — SIGNIFICANT CHANGE UP (ref 8.5–10.1)
CHLORIDE SERPL-SCNC: 102 MMOL/L — SIGNIFICANT CHANGE UP (ref 98–110)
CO2 SERPL-SCNC: 26 MMOL/L — SIGNIFICANT CHANGE UP (ref 17–32)
CREAT SERPL-MCNC: 0.8 MG/DL — SIGNIFICANT CHANGE UP (ref 0.7–1.5)
GLUCOSE SERPL-MCNC: 109 MG/DL — HIGH (ref 70–99)
HCT VFR BLD CALC: 39.9 % — SIGNIFICANT CHANGE UP (ref 37–47)
HGB BLD-MCNC: 13.5 G/DL — SIGNIFICANT CHANGE UP (ref 12–16)
MCHC RBC-ENTMCNC: 30.8 PG — SIGNIFICANT CHANGE UP (ref 27–31)
MCHC RBC-ENTMCNC: 33.8 G/DL — SIGNIFICANT CHANGE UP (ref 32–37)
MCV RBC AUTO: 91.1 FL — SIGNIFICANT CHANGE UP (ref 81–99)
NRBC # BLD: 0 /100 WBCS — SIGNIFICANT CHANGE UP (ref 0–0)
PLATELET # BLD AUTO: 255 K/UL — SIGNIFICANT CHANGE UP (ref 130–400)
POTASSIUM SERPL-MCNC: 4.2 MMOL/L — SIGNIFICANT CHANGE UP (ref 3.5–5)
POTASSIUM SERPL-SCNC: 4.2 MMOL/L — SIGNIFICANT CHANGE UP (ref 3.5–5)
RBC # BLD: 4.38 M/UL — SIGNIFICANT CHANGE UP (ref 4.2–5.4)
RBC # FLD: 12.7 % — SIGNIFICANT CHANGE UP (ref 11.5–14.5)
SODIUM SERPL-SCNC: 139 MMOL/L — SIGNIFICANT CHANGE UP (ref 135–146)
WBC # BLD: 4.61 K/UL — LOW (ref 4.8–10.8)
WBC # FLD AUTO: 4.61 K/UL — LOW (ref 4.8–10.8)

## 2019-03-20 RX ORDER — CADEXOMER IODINE 0.9 %
1 PADS, MEDICATED (EA) TOPICAL ONCE
Qty: 0 | Refills: 0 | Status: COMPLETED | OUTPATIENT
Start: 2019-03-20 | End: 2019-03-21

## 2019-03-20 RX ADMIN — Medication 650 MILLIGRAM(S): at 06:00

## 2019-03-20 RX ADMIN — Medication 250 MILLIGRAM(S): at 06:02

## 2019-03-20 RX ADMIN — Medication 0.5 MILLIGRAM(S): at 14:01

## 2019-03-20 RX ADMIN — HEPARIN SODIUM 5000 UNIT(S): 5000 INJECTION INTRAVENOUS; SUBCUTANEOUS at 05:18

## 2019-03-20 RX ADMIN — Medication 0.5 MILLIGRAM(S): at 06:00

## 2019-03-20 RX ADMIN — Medication 250 MILLIGRAM(S): at 17:23

## 2019-03-20 RX ADMIN — BUDESONIDE AND FORMOTEROL FUMARATE DIHYDRATE 2 PUFF(S): 160; 4.5 AEROSOL RESPIRATORY (INHALATION) at 22:15

## 2019-03-20 RX ADMIN — Medication 0.5 MILLIGRAM(S): at 22:15

## 2019-03-20 RX ADMIN — PANTOPRAZOLE SODIUM 40 MILLIGRAM(S): 20 TABLET, DELAYED RELEASE ORAL at 06:01

## 2019-03-20 RX ADMIN — LOSARTAN POTASSIUM 100 MILLIGRAM(S): 100 TABLET, FILM COATED ORAL at 05:18

## 2019-03-20 RX ADMIN — Medication 0.6 MILLIGRAM(S): at 11:16

## 2019-03-20 RX ADMIN — HEPARIN SODIUM 5000 UNIT(S): 5000 INJECTION INTRAVENOUS; SUBCUTANEOUS at 17:24

## 2019-03-20 RX ADMIN — Medication 200 MICROGRAM(S): at 05:18

## 2019-03-20 RX ADMIN — BUDESONIDE AND FORMOTEROL FUMARATE DIHYDRATE 2 PUFF(S): 160; 4.5 AEROSOL RESPIRATORY (INHALATION) at 08:11

## 2019-03-20 NOTE — CONSULT NOTE ADULT - SUBJECTIVE AND OBJECTIVE BOX
Patient is a 73y old  Female who presents with a chief complaint of pain, swelling    left foot with  ulceration on the 2nd toe---progressive few days duration (20 Mar 2019 16:04)      INTERVAL HPI/OVERNIGHT EVENTS:        PAST MEDICAL & SURGICAL HISTORY:  Chronic obstructive pulmonary disease, unspecified COPD type: h7p  abg/  Asthma: no recent attacks  Osteoarthritis  Blood clot in vein: 5 y ago with treatment  Hypothyroid  HTN (hypertension)  Depression: no suicide ideation  Arthritis: OA  H/O hernia repair: may 25 2018  History of cholecystectomy  H/O tubal ligation  S/P tonsillectomy      REVIEW OF SYSTEMS: Total of twelve systems have been reviewed with patient and found to be negative unless mentioned in HPI      SOCIAL HISTORY  Alcohol: Does not drink  Tobacco: Does not smoke  Illicit substance use: None      FAMILY HISTORY: Non contributory to the present illness        penicillin (Hives)  penicillins (Rash; Flushing; Hives)        T(C): 37.1 (03-20-19 @ 14:49), Max: 37.1 (03-20-19 @ 14:49)  HR: 66 (03-20-19 @ 14:49) (66 - 68)  BP: 103/53 (03-20-19 @ 14:49) (103/53 - 128/62)  RR: 16 (03-20-19 @ 14:49) (16 - 18)  SpO2: --        PHYSICAL EXAM:  GENERAL: Not in distress   CHEST/LUNG:  Aire ntry bilaterally  HEART: s1 and s2 present  ABDOMEN:  Nontender and  Nondistended  EXTREMITIES: No pedal  edema  CNS: Awake and Alert      LABS:                        13.5   4.61  )-----------( 255      ( 20 Mar 2019 07:41 )             39.9     03-20    139  |  102  |  8<L>  ----------------------------<  109<H>  4.2   |  26  |  0.8    Ca    8.9      20 Mar 2019 07:41    TPro  7.4  /  Alb  4.5  /  TBili  0.5  /  DBili  x   /  AST  50<H>  /  ALT  15  /  AlkPhos  78  03-19        CAPILLARY BLOOD GLUCOSE                MEDICATIONS  (STANDING):  buDESOnide  80 MICROgram(s)/formoterol 4.5 MICROgram(s) Inhaler 2 Puff(s) Inhalation two times a day  cadexomer iodine 0.9% Gel 1 Application(s) Topical once  clonazePAM Tablet 0.5 milliGRAM(s) Oral three times a day  colchicine 0.6 milliGRAM(s) Oral daily  heparin  Injectable 5000 Unit(s) SubCutaneous every 12 hours  influenza   Vaccine 0.5 milliLiter(s) IntraMuscular once  levothyroxine 200 MICROGram(s) Oral daily  losartan 100 milliGRAM(s) Oral daily  pantoprazole    Tablet 40 milliGRAM(s) Oral before breakfast  vancomycin  IVPB 1000 milliGRAM(s) IV Intermittent two times a day    MEDICATIONS  (PRN):  acetaminophen   Tablet .. 650 milliGRAM(s) Oral every 6 hours PRN Temp greater or equal to 38C (100.4F), Moderate Pain (4 - 6)  ALBUTerol    90 MICROgram(s) HFA Inhaler 2 Puff(s) Inhalation every 6 hours PRN Bronchospasm          RADIOLOGY & ADDITIONAL TESTS: Patient is a 73y old  Female who presents with a chief complaint of pain, swelling    left foot with  ulceration on the 2nd toe---progressive few days duration (20 Mar 2019 16:04)      REVIEW OF SYSTEMS: Total of twelve systems have been reviewed with patient and found to be negative unless mentioned in HPI        PAST MEDICAL & SURGICAL HISTORY:  Chronic obstructive pulmonary disease, unspecified COPD type: h7p  abg/  Asthma: no recent attacks  Osteoarthritis  Blood clot in vein: 5 y ago with treatment  Hypothyroid, HTN (hypertension)  Depression: no suicide ideation  H/O hernia repair: may 25 2018  History of cholecystectomy  H/O tubal ligation  S/P tonsillectomy        SOCIAL HISTORY  Alcohol: Does not drink  Tobacco: Does not smoke  Illicit substance use: None      FAMILY HISTORY: Non contributory to the present illness      ALLERGIES:   penicillin (Flushing; Hives)        T(C): 37.1 (03-20-19 @ 14:49), Max: 37.1 (03-20-19 @ 14:49)  HR: 66 (03-20-19 @ 14:49) (66 - 68)  BP: 103/53 (03-20-19 @ 14:49) (103/53 - 128/62)  RR: 16 (03-20-19 @ 14:49) (16 - 18)  SpO2: --        PHYSICAL EXAM:  GENERAL: Not in distress   CHEST/LUNG:  Air entry bilaterally  HEART: s1 and s2 present  ABDOMEN:  Nontender and  Nondistended  EXTREMITIES: Left foot bandage in placed   CNS: Awake and Alert        LABS:                        13.5   4.61  )-----------( 255      ( 20 Mar 2019 07:41 )             39.9         03-20    139  |  102  |  8<L>  ----------------------------<  109<H>  4.2   |  26  |  0.8    Ca    8.9      20 Mar 2019 07:41    TPro  7.4  /  Alb  4.5  /  TBili  0.5  /  DBili  x   /  AST  50<H>  /  ALT  15  /  AlkPhos  78  03-19          MEDICATIONS  (STANDING):  buDESOnide  80 MICROgram(s)/formoterol 4.5 MICROgram(s) Inhaler 2 Puff(s) Inhalation two times a day  cadexomer iodine 0.9% Gel 1 Application(s) Topical once  clonazePAM Tablet 0.5 milliGRAM(s) Oral three times a day  colchicine 0.6 milliGRAM(s) Oral daily  heparin  Injectable 5000 Unit(s) SubCutaneous every 12 hours  influenza   Vaccine 0.5 milliLiter(s) IntraMuscular once  levothyroxine 200 MICROGram(s) Oral daily  losartan 100 milliGRAM(s) Oral daily  pantoprazole    Tablet 40 milliGRAM(s) Oral before breakfast  vancomycin  IVPB 1000 milliGRAM(s) IV Intermittent two times a day    MEDICATIONS  (PRN):  acetaminophen   Tablet .. 650 milliGRAM(s) Oral every 6 hours PRN Temp greater or equal to 38C (100.4F), Moderate Pain (4 - 6)  ALBUTerol    90 MICROgram(s) HFA Inhaler 2 Puff(s) Inhalation every 6 hours PRN Bronchospasm        RADIOLOGY & ADDITIONAL TESTS:      < from: Xray Chest 2 Views PA/Lat (03.19.19 @ 19:02) >  Right basilar atelectasis.    < end of copied text >    < from: Xray Foot AP + Lateral + Oblique, Left (03.19.19 @ 19:02) >  Findings suggestive of acute or chronic osteomyelitis involving second   proximal phalangeal joint  Diffuse soft tissue swelling without evidence of gas formation.        < end of copied text >

## 2019-03-20 NOTE — CONSULT NOTE ADULT - ATTENDING COMMENTS
Infected second left toe ulcer with chronic OM and cellulitis  IV antibiotics. possible picc line 6 weeks or oral. awaiting Infectious diseases consult  local wound care with iodosorb

## 2019-03-20 NOTE — CONSULT NOTE ADULT - SUBJECTIVE AND OBJECTIVE BOX
PODIATRY CONSULT   ALDO DYKES is a  73y old  Female who presents with a chief complaint of pain, swelling    left foot with  ulceration on the 2nd toe---progressive few days duration (19 Mar 2019 20:15)    HPI:  · HPI Objective Statement: 72 y/o female with hx of NIDDM , HTN presents c/o left foot swelling , ulcer to left 2nd toe and redness to lower leg worsening x 1 week. patient without any trauma or falls. patient seen by podiatrist today and sent to the ED for admission . patient without any fever,chills. patient with pain to left calf . patient without vomiting , cp, sob. (19 Mar 2019 20:15)    OSTEOMYELITIS OF FOOT LEFT ACUTE DIABETIC FOOT ULCER  Chronic obstructive pulmonary disease, unspecified COPD type  Asthma  Osteoarthritis  Blood clot in vein  Hypothyroid  HTN (hypertension)  Depression  Arthritis  Hypothyroidism  HTN (hypertension)      PMH: OSTEOMYELITIS OF FOOT LEFT ACUTE DIABETIC FOOT ULCER  Chronic obstructive pulmonary disease, unspecified COPD type  Asthma  Osteoarthritis  Blood clot in vein  Hypothyroid  HTN (hypertension)  Depression  Arthritis  Hypothyroidism  HTN (hypertension)    PSH: H/O hernia repair  History of cholecystectomy  H/O tubal ligation  S/P tonsillectomy    Medication vancomycin  IVPB 1000 milliGRAM(s) IV Intermittent two times a day    Allergy: penicillin (Hives)  penicillins (Rash; Flushing; Hives)        Labs:                        13.5   4.61  )-----------( 255      ( 20 Mar 2019 07:41 )             39.9       03-19    136  |  100  |  12  ----------------------------<  97  6.2<HH>   |  25  |  0.9    Ca    9.3      19 Mar 2019 19:25    TPro  7.4  /  Alb  4.5  /  TBili  0.5  /  DBili  x   /  AST  50<H>  /  ALT  15  /  AlkPhos  78  03-19            O:   Derm: Ulceration Left 2nd toe medial aspect, wound base Fibrogranular, wound size ( 1.0 cm X 1.0m X 0.5cm) + edema, + ponce-wound erythema, - purulence, + probe to bone.  Vascular: L foot  Dorsalis Pedis 2/4 and Posterior Tibial pulses 1/4 Right foot DT and PT non-palpable.  Capillary re-fill time less then 3 seconds digits 1-5 bilateral. Left foot warm to touch  Neuro: Protective sensation intact to the level of the digits bilateral.  MSK: Muscle strength 5/5 all major muscle groups bilateral. left 2nd toe floppy. Hammertoe deformity R 2nd toe. HAV deformity R foot    < from: Xray Foot AP + Lateral + Oblique, Left (03.19.19 @ 19:02) >    EXAM:  XR FOOT COMP MIN 3 VIEWS LT            PROCEDURE DATE:  03/19/2019            INTERPRETATION:  Clinical History / Reason for exam: Ulceration.    Comparison: September 17, 2018.    Procedure: Three views of the left foot.    Findings:    There is destruction of the second proximal interphalangeal joint with   cortical erosive changes involving the second proximal phalanx and middle   phalanx. There is subluxation of the second interphalangeal joints.   Findings suggestive of acute on chronic osteomyelitis. Chronic changes   involving the third interphalangeal joint without change. Diffuse soft   tissue swelling without evidence of gas formation. Plantar calcaneal spur   and Achilles tendon enthesopathy.    Impression:    Findings suggestive of acute or chronic osteomyelitis involving second   proximal phalangeal joint, as above.  Diffuse soft tissue swelling without evidence of gas formation.                  ELLIOT LANDAU M.D., ATTENDING RADIOLOGIST  This document has been electronically signed. Mar 20 2019  8:48AM    < end of copied text >      Assessment:   left 2nd toe ulcer  P:  Chart reviewed and Patient evaluated  Discussed diagnosis and treatment with patient  Wound flush with normal saline  Applied betadine with dry sterile dressing  Wound Care orders: Flush with saline Betadine/DSD/Kerlix   X-rays reviewed as above  Recommend ID consult  Continue IV abx as per ID  Podiatry will follow while in house.   will discuss care plan  with  Attending

## 2019-03-20 NOTE — PROGRESS NOTE ADULT - SUBJECTIVE AND OBJECTIVE BOX
ALDO DYKES  73y  Female      Patient is a 73y old  Female who presents with a chief complaint of pain, swelling    left foot with  ulceration on the 2nd toe---progressive few days duration (20 Mar 2019 09:39)      INTERVAL HPI/OVERNIGHT EVENTS:  pt seen and examined at bedside  -pt speaks Bel[place]ian (used Son Marshall as a  as per the patient's request)  -will continue with IV antibiotics with ID consult   -oob to chair as tolerated     -Vital Signs Last 24 Hrs  T(C): 37.1 (20 Mar 2019 14:49), Max: 37.3 (19 Mar 2019 18:29)  T(F): 98.7 (20 Mar 2019 14:49), Max: 99.2 (19 Mar 2019 18:29)  HR: 66 (20 Mar 2019 14:49) (66 - 80)  BP: 103/53 (20 Mar 2019 14:49) (103/53 - 182/95)  BP(mean): 140 (19 Mar 2019 18:29) (140 - 140)  RR: 16 (20 Mar 2019 14:49) (16 - 19)  SpO2: 97% (19 Mar 2019 20:20) (97% - 97%)    PHYSICAL EXAM:  GENERAL: NAD, well-groomed, well-developed, sitting up in bed   HEAD:  Atraumatic, Normocephalic  EYES: EOMI, PERRLA, conjunctiva and sclera clear  NERVOUS SYSTEM:  Alert & Oriented X 3  CHEST/LUNG: Clear to percussion bilaterally; No rales, rhonchi, wheezing, or rubs  CV/HEART: Regular rate and rhythm; No murmurs, rubs, or gallops  GI/ABDOMEN: Soft, Nontender, obese abdomen; Bowel sounds present  EXTREMITIES:  2+ Peripheral Pulses, No clubbing, cyanosis, or edema  SKIN: Left foot dressing     LAB:                        13.5   4.61  )-----------( 255      ( 20 Mar 2019 07:41 )             39.9     03-20    139  |  102  |  8<L>  ----------------------------<  109<H>  4.2   |  26  |  0.8    Ca    8.9      20 Mar 2019 07:41    TPro  7.4  /  Alb  4.5  /  TBili  0.5  /  DBili  x   /  AST  50<H>  /  ALT  15  /  AlkPhos  78  03-19      LIVER FUNCTIONS - ( 19 Mar 2019 19:25 )  Alb: 4.5 g/dL / Pro: 7.4 g/dL / ALK PHOS: 78 U/L / ALT: 15 U/L / AST: 50 U/L / GGT: x           RADIOLOGY:    Imaging Personally Reviewed:  [ Y ] YES  [ ] NO    HEALTH ISSUES - PROBLEM Dx:    MEDS:  acetaminophen   Tablet .. 650 milliGRAM(s) Oral every 6 hours PRN  ALBUTerol    90 MICROgram(s) HFA Inhaler 2 Puff(s) Inhalation every 6 hours PRN  buDESOnide  80 MICROgram(s)/formoterol 4.5 MICROgram(s) Inhaler 2 Puff(s) Inhalation two times a day  clonazePAM Tablet 0.5 milliGRAM(s) Oral three times a day  colchicine 0.6 milliGRAM(s) Oral daily  heparin  Injectable 5000 Unit(s) SubCutaneous every 12 hours  influenza   Vaccine 0.5 milliLiter(s) IntraMuscular once  levothyroxine 200 MICROGram(s) Oral daily  losartan 100 milliGRAM(s) Oral daily  pantoprazole    Tablet 40 milliGRAM(s) Oral before breakfast  vancomycin  IVPB 1000 milliGRAM(s) IV Intermittent two times a day

## 2019-03-20 NOTE — CONSULT NOTE ADULT - ASSESSMENT
# Left second toe ulcer with OM    would recommend:  1. Obtain deep tissue culture of Left second toe ulcer for guided therapy  2. Wound care as per podiatry  3. Continue IV Vancomycin for now and Keep level between 15 to 20   4. Keep  Left foot elevated    will follow the patient with you and make further recommendation based on the clinical course and Lab results  Thank you for the opportunity to participate in MsMj JANIA's care
Infected second left toe ulcer with chronic OM and cellulitis

## 2019-03-20 NOTE — PROGRESS NOTE ADULT - ASSESSMENT
patient admitted for foot infection     1) Acute Osteomyelitis Left Foot:  -on IV antibiotics  -check vanco levels   -ID consult (picc line once cultures negative)  -Podiatry following the patient   -ESR not available, but xray consistent with osteo so will hold off ESR CRP    2) COPD  -stable on home meds    3) HTN:  -home meds    4) Hypothyroidism:  -on synthroid    5) Gout hx; stable on maintenance meds     6) severe/morbidly obese BMI > 50  -weight loss and diet modification     7) suspected underlying CKD stage III; needs kidney monitoring and nephrology follow up for proper diagnosis     8) Social Issues:  -need 's input for abx coverage as patient may not have medication coverage as per the home care nurse       # Progress Note Handoff  PENDING as follows  consults: ID consult   Test: Blood cultures pending from 03/19/19  Other: PICC line as per ID   Family discussion: with bakari Olivares, aware of the inpatient IV antibiotics and possibility of long term abx; understood plan of care   Disposition:  Home __with home care __ or SNF _____ Other _____ Unknown at this time ____

## 2019-03-21 DIAGNOSIS — M86.60 OTHER CHRONIC OSTEOMYELITIS, UNSPECIFIED SITE: ICD-10-CM

## 2019-03-21 LAB
ANION GAP SERPL CALC-SCNC: 10 MMOL/L — SIGNIFICANT CHANGE UP (ref 7–14)
BUN SERPL-MCNC: 8 MG/DL — LOW (ref 10–20)
CALCIUM SERPL-MCNC: 8.7 MG/DL — SIGNIFICANT CHANGE UP (ref 8.5–10.1)
CHLORIDE SERPL-SCNC: 105 MMOL/L — SIGNIFICANT CHANGE UP (ref 98–110)
CO2 SERPL-SCNC: 26 MMOL/L — SIGNIFICANT CHANGE UP (ref 17–32)
CREAT SERPL-MCNC: 0.8 MG/DL — SIGNIFICANT CHANGE UP (ref 0.7–1.5)
GLUCOSE SERPL-MCNC: 110 MG/DL — HIGH (ref 70–99)
HCT VFR BLD CALC: 37 % — SIGNIFICANT CHANGE UP (ref 37–47)
HGB BLD-MCNC: 12.7 G/DL — SIGNIFICANT CHANGE UP (ref 12–16)
MCHC RBC-ENTMCNC: 31 PG — SIGNIFICANT CHANGE UP (ref 27–31)
MCHC RBC-ENTMCNC: 34.3 G/DL — SIGNIFICANT CHANGE UP (ref 32–37)
MCV RBC AUTO: 90.2 FL — SIGNIFICANT CHANGE UP (ref 81–99)
NRBC # BLD: 0 /100 WBCS — SIGNIFICANT CHANGE UP (ref 0–0)
PLATELET # BLD AUTO: 240 K/UL — SIGNIFICANT CHANGE UP (ref 130–400)
POTASSIUM SERPL-MCNC: 4.7 MMOL/L — SIGNIFICANT CHANGE UP (ref 3.5–5)
POTASSIUM SERPL-SCNC: 4.7 MMOL/L — SIGNIFICANT CHANGE UP (ref 3.5–5)
RBC # BLD: 4.1 M/UL — LOW (ref 4.2–5.4)
RBC # FLD: 12.7 % — SIGNIFICANT CHANGE UP (ref 11.5–14.5)
SODIUM SERPL-SCNC: 141 MMOL/L — SIGNIFICANT CHANGE UP (ref 135–146)
VANCOMYCIN TROUGH SERPL-MCNC: 9.5 UG/ML — SIGNIFICANT CHANGE UP (ref 5–10)
WBC # BLD: 4.26 K/UL — LOW (ref 4.8–10.8)
WBC # FLD AUTO: 4.26 K/UL — LOW (ref 4.8–10.8)

## 2019-03-21 RX ORDER — DIPHENHYDRAMINE HCL 50 MG
25 CAPSULE ORAL ONCE
Qty: 0 | Refills: 0 | Status: COMPLETED | OUTPATIENT
Start: 2019-03-21 | End: 2019-03-21

## 2019-03-21 RX ORDER — LINEZOLID 600 MG/300ML
600 INJECTION, SOLUTION INTRAVENOUS EVERY 12 HOURS
Qty: 0 | Refills: 0 | Status: DISCONTINUED | OUTPATIENT
Start: 2019-03-21 | End: 2019-03-22

## 2019-03-21 RX ADMIN — LOSARTAN POTASSIUM 100 MILLIGRAM(S): 100 TABLET, FILM COATED ORAL at 05:48

## 2019-03-21 RX ADMIN — Medication 250 MILLIGRAM(S): at 07:18

## 2019-03-21 RX ADMIN — Medication 25 MILLIGRAM(S): at 06:16

## 2019-03-21 RX ADMIN — Medication 0.5 MILLIGRAM(S): at 21:16

## 2019-03-21 RX ADMIN — Medication 650 MILLIGRAM(S): at 01:32

## 2019-03-21 RX ADMIN — Medication 0.5 MILLIGRAM(S): at 13:16

## 2019-03-21 RX ADMIN — Medication 0.5 MILLIGRAM(S): at 05:46

## 2019-03-21 RX ADMIN — LINEZOLID 600 MILLIGRAM(S): 600 INJECTION, SOLUTION INTRAVENOUS at 17:00

## 2019-03-21 RX ADMIN — PANTOPRAZOLE SODIUM 40 MILLIGRAM(S): 20 TABLET, DELAYED RELEASE ORAL at 05:47

## 2019-03-21 RX ADMIN — HEPARIN SODIUM 5000 UNIT(S): 5000 INJECTION INTRAVENOUS; SUBCUTANEOUS at 05:47

## 2019-03-21 RX ADMIN — Medication 1 APPLICATION(S): at 13:16

## 2019-03-21 RX ADMIN — Medication 25 MILLIGRAM(S): at 17:00

## 2019-03-21 RX ADMIN — BUDESONIDE AND FORMOTEROL FUMARATE DIHYDRATE 2 PUFF(S): 160; 4.5 AEROSOL RESPIRATORY (INHALATION) at 07:30

## 2019-03-21 RX ADMIN — Medication 200 MICROGRAM(S): at 05:48

## 2019-03-21 RX ADMIN — Medication 0.6 MILLIGRAM(S): at 11:53

## 2019-03-21 RX ADMIN — Medication 650 MILLIGRAM(S): at 19:32

## 2019-03-21 RX ADMIN — Medication 650 MILLIGRAM(S): at 02:02

## 2019-03-21 RX ADMIN — HEPARIN SODIUM 5000 UNIT(S): 5000 INJECTION INTRAVENOUS; SUBCUTANEOUS at 17:00

## 2019-03-21 NOTE — PROGRESS NOTE ADULT - SUBJECTIVE AND OBJECTIVE BOX
infectious diseases progress note:  ALDO DYKES is a 73yFemale patient    OSTEOMYELITIS OF FOOT LEFT ACUTE DIABETIC FOOT ULCER    Chronic toe ulcer, left, with necrosis of bone    was on IV Abx - last year for same toe - MRSA - JAIME Vanco - 2   6 weeks IV Vanco       ROS:  itching with IV VAnco     Allergies    penicillin (Hives)  penicillins (Rash; Flushing; Hives)    Intolerances        ANTIBIOTICS/RELEVANT:  antimicrobials  vancomycin  IVPB 1000 milliGRAM(s) IV Intermittent two times a day    immunologic:  influenza   Vaccine 0.5 milliLiter(s) IntraMuscular once    OTHER:  acetaminophen   Tablet .. 650 milliGRAM(s) Oral every 6 hours PRN  ALBUTerol    90 MICROgram(s) HFA Inhaler 2 Puff(s) Inhalation every 6 hours PRN  buDESOnide  80 MICROgram(s)/formoterol 4.5 MICROgram(s) Inhaler 2 Puff(s) Inhalation two times a day  cadexomer iodine 0.9% Gel 1 Application(s) Topical once  clonazePAM Tablet 0.5 milliGRAM(s) Oral three times a day  colchicine 0.6 milliGRAM(s) Oral daily  heparin  Injectable 5000 Unit(s) SubCutaneous every 12 hours  levothyroxine 200 MICROGram(s) Oral daily  losartan 100 milliGRAM(s) Oral daily  pantoprazole    Tablet 40 milliGRAM(s) Oral before breakfast      Objective:  T(F): 96.7 (03-21-19 @ 05:49), Max: 98.7 (03-20-19 @ 14:49)  HR: 66 (03-21-19 @ 05:49) (66 - 76)  BP: 113/54 (03-21-19 @ 05:49) (103/53 - 138/81)  RR: 16 (03-21-19 @ 05:49) (16 - 16)  SpO2: --    PHYSICAL EXAM:  Constitutional:Well-developed, well nourished  congested nares 	  Neck:no JVD, no lymphadenopathy, supple  Respiratory: CTA maira  Cardiovascular: S1S2 RRR  Gastrointestinal:soft, (+) BS, no HSM  Extremities: Left 2nd toe ulcer - probe to bone - NO drainage         LABS:                        13.5   4.61  )-----------( 255      ( 20 Mar 2019 07:41 )             39.9     03-20    139  |  102  |  8<L>  ----------------------------<  109<H>  4.2   |  26  |  0.8    Ca    8.9      20 Mar 2019 07:41    TPro  7.4  /  Alb  4.5  /  TBili  0.5  /  DBili  x   /  AST  50<H>  /  ALT  15  /  AlkPhos  78  03-19            MICROBIOLOGY:    Culture - Blood (collected 03-19-19 @ 19:25)  Source: .Blood Blood  Preliminary Report (03-21-19 @ 03:00):    No growth to date.        Culture - Blood (collected 19 Mar 2019 19:25)  Source: .Blood Blood  Preliminary Report (21 Mar 2019 03:00):    No growth to date.      Culture Results:   No growth to date. (03-19 @ 19:25)        RADIOLOGY & ADDITIONAL STUDIES:

## 2019-03-21 NOTE — PROGRESS NOTE ADULT - ASSESSMENT
patient admitted for left foot infection of a chronic ulcer, follows Dr Roy    1) Acute Osteomyelitis Left Foot  -Developed diffuse itching after vancomycin infusion  -Prelim blood culture negative, awaiting wound culture - prior cultures with MRSA  - ID elsyal appreciated, switch to Zyvox   - no need for isolation    2) COPD  -stable on home meds    3) HTN:  -home meds    4) Hypothyroidism:  -on synthroid    5) Gout hx; stable on maintenance meds     6) severe/morbidly obese BMI > 50  -weight loss and diet modification     7) suspected underlying CKD stage II; needs kidney monitoring and nephrology follow up for proper diagnosis     #Progress Note Handoff  Pending (specify):  Consults_________, Tests________, Test Results___WOUND CULTURE____, Other_________  Family discussion: discussed plan of care and dc planning with patient and daughter at bedside  Disposition: Home___/SNF___/Other________/Unknown at this time___Patient does not have insurance and will be difficult to discharge as needs long term Zyvox, CM aware_____

## 2019-03-21 NOTE — PROGRESS NOTE ADULT - PROBLEM SELECTOR PLAN 1
Left foot   recurrence    prior Cx MRSA  Zyvox 600 mg Q12   podiatry follow up    DC Isolation - NO drainage  Wound cx done

## 2019-03-21 NOTE — PROGRESS NOTE ADULT - SUBJECTIVE AND OBJECTIVE BOX
ALDO DYKES  73y Female    CHIEF COMPLAINT:    Patient is a 73y old  Female who presents with a chief complaint of pain, swelling    left foot with  ulceration on the 2nd toe---progressive few days duration (21 Mar 2019 08:05)      INTERVAL HPI/OVERNIGHT EVENTS:    Patient seen and examined. Patient had an allergic reaction to Vancomycin infusion, diffuse itching    ROS: All other systems are negative.    Vital Signs:    T(F): 96.7 (03-21-19 @ 05:49), Max: 98.7 (03-20-19 @ 14:49)  HR: 66 (03-21-19 @ 05:49) (66 - 76)  BP: 113/54 (03-21-19 @ 05:49) (103/53 - 138/81)  RR: 16 (03-21-19 @ 05:49) (16 - 16)    PHYSICAL EXAM:    GENERAL:  NAD, obese  SKIN: No rashes or lesions  HEENT: Atraumatic. Normocephalic.   NECK: Supple, No JVD. No lymphadenopathy.  PULMONARY: CTA B/L. No wheezing. No rales  CVS: Normal S1, S2. Rate and Rhythm are regular. No murmurs.  ABDOMEN/GI: Soft, Nontender, Nondistended; BS present  MSK:  No edema B/L LE. LLE dressing intact, dry  NEUROLOGIC:  No motor or sensory deficit.  PSYCH: Alert & oriented x 3, normal affect    Consultant(s) Notes Reviewed:  [x ] YES  [ ] NO  Care Discussed with Consultants/Other Providers [ x] YES  [ ] NO    LABS:                        12.7   4.26  )-----------( 240      ( 21 Mar 2019 07:24 )             37.0     03-21    141  |  105  |  8<L>  ----------------------------<  110<H>  4.7   |  26  |  0.8    Ca    8.7      21 Mar 2019 07:24    TPro  7.4  /  Alb  4.5  /  TBili  0.5  /  DBili  x   /  AST  50<H>  /  ALT  15  /  AlkPhos  78  03-19    Culture - Blood (collected 19 Mar 2019 19:25)  Source: .Blood Blood  Preliminary Report (21 Mar 2019 03:00):    No growth to date.  RADIOLOGY & ADDITIONAL TESTS:      Medications:  Standing  buDESOnide  80 MICROgram(s)/formoterol 4.5 MICROgram(s) Inhaler 2 Puff(s) Inhalation two times a day  cadexomer iodine 0.9% Gel 1 Application(s) Topical once  clonazePAM Tablet 0.5 milliGRAM(s) Oral three times a day  colchicine 0.6 milliGRAM(s) Oral daily  heparin  Injectable 5000 Unit(s) SubCutaneous every 12 hours  influenza   Vaccine 0.5 milliLiter(s) IntraMuscular once  levothyroxine 200 MICROGram(s) Oral daily  linezolid    Tablet 600 milliGRAM(s) Oral every 12 hours  losartan 100 milliGRAM(s) Oral daily  pantoprazole    Tablet 40 milliGRAM(s) Oral before breakfast  vancomycin  IVPB 1000 milliGRAM(s) IV Intermittent two times a day    PRN Meds  acetaminophen   Tablet .. 650 milliGRAM(s) Oral every 6 hours PRN  ALBUTerol    90 MICROgram(s) HFA Inhaler 2 Puff(s) Inhalation every 6 hours PRN      Louise Reed MD  s. [phone]

## 2019-03-22 ENCOUNTER — TRANSCRIPTION ENCOUNTER (OUTPATIENT)
Age: 74
End: 2019-03-22

## 2019-03-22 VITALS
RESPIRATION RATE: 16 BRPM | HEART RATE: 80 BPM | TEMPERATURE: 97 F | DIASTOLIC BLOOD PRESSURE: 86 MMHG | SYSTOLIC BLOOD PRESSURE: 165 MMHG

## 2019-03-22 RX ORDER — LOSARTAN POTASSIUM 100 MG/1
1 TABLET, FILM COATED ORAL
Qty: 0 | Refills: 0 | COMMUNITY

## 2019-03-22 RX ORDER — CLONAZEPAM 1 MG
1 TABLET ORAL
Qty: 0 | Refills: 0 | DISCHARGE
Start: 2019-03-22

## 2019-03-22 RX ORDER — CLONAZEPAM 1 MG
1 TABLET ORAL
Qty: 0 | Refills: 0 | COMMUNITY

## 2019-03-22 RX ORDER — LOSARTAN POTASSIUM 100 MG/1
1 TABLET, FILM COATED ORAL
Qty: 0 | Refills: 0 | DISCHARGE
Start: 2019-03-22

## 2019-03-22 RX ORDER — CLONAZEPAM 1 MG
0.5 TABLET ORAL
Qty: 0 | Refills: 0 | COMMUNITY

## 2019-03-22 RX ORDER — DIPHENHYDRAMINE HCL 50 MG
25 CAPSULE ORAL ONCE
Qty: 0 | Refills: 0 | Status: COMPLETED | OUTPATIENT
Start: 2019-03-22 | End: 2019-03-22

## 2019-03-22 RX ORDER — DIPHENHYDRAMINE HCL 50 MG
25 CAPSULE ORAL EVERY 6 HOURS
Qty: 0 | Refills: 0 | Status: DISCONTINUED | OUTPATIENT
Start: 2019-03-22 | End: 2019-03-22

## 2019-03-22 RX ORDER — LINEZOLID 600 MG/300ML
1 INJECTION, SOLUTION INTRAVENOUS
Qty: 0 | Refills: 0 | DISCHARGE
Start: 2019-03-22

## 2019-03-22 RX ORDER — LEVOTHYROXINE SODIUM 125 MCG
1 TABLET ORAL
Qty: 0 | Refills: 0 | COMMUNITY

## 2019-03-22 RX ORDER — COLCHICINE 0.6 MG
1 TABLET ORAL
Qty: 0 | Refills: 0 | COMMUNITY

## 2019-03-22 RX ORDER — LEVOTHYROXINE SODIUM 125 MCG
1 TABLET ORAL
Qty: 0 | Refills: 0 | DISCHARGE
Start: 2019-03-22

## 2019-03-22 RX ORDER — COLCHICINE 0.6 MG
1 TABLET ORAL
Qty: 0 | Refills: 0 | DISCHARGE
Start: 2019-03-22

## 2019-03-22 RX ADMIN — LINEZOLID 600 MILLIGRAM(S): 600 INJECTION, SOLUTION INTRAVENOUS at 17:15

## 2019-03-22 RX ADMIN — Medication 0.5 MILLIGRAM(S): at 06:15

## 2019-03-22 RX ADMIN — LINEZOLID 600 MILLIGRAM(S): 600 INJECTION, SOLUTION INTRAVENOUS at 06:16

## 2019-03-22 RX ADMIN — Medication 200 MICROGRAM(S): at 06:16

## 2019-03-22 RX ADMIN — PANTOPRAZOLE SODIUM 40 MILLIGRAM(S): 20 TABLET, DELAYED RELEASE ORAL at 06:16

## 2019-03-22 RX ADMIN — HEPARIN SODIUM 5000 UNIT(S): 5000 INJECTION INTRAVENOUS; SUBCUTANEOUS at 06:16

## 2019-03-22 RX ADMIN — BUDESONIDE AND FORMOTEROL FUMARATE DIHYDRATE 2 PUFF(S): 160; 4.5 AEROSOL RESPIRATORY (INHALATION) at 14:22

## 2019-03-22 RX ADMIN — LOSARTAN POTASSIUM 100 MILLIGRAM(S): 100 TABLET, FILM COATED ORAL at 06:16

## 2019-03-22 RX ADMIN — Medication 0.5 MILLIGRAM(S): at 14:21

## 2019-03-22 RX ADMIN — Medication 0.6 MILLIGRAM(S): at 14:21

## 2019-03-22 RX ADMIN — Medication 25 MILLIGRAM(S): at 02:51

## 2019-03-22 NOTE — DISCHARGE NOTE PROVIDER - HOSPITAL COURSE
73F PMHx DM2, gout, anxiety, HTN, hypothyroidism, L foot ulcer sent from her podiatrist office for woresning ulcer. Xray demosntrated chronic osteo. Seen by ID, started on po zyvox. She was seen by podiatry as well with wound care. She improved throughout hospital course. D/c on 3/22 with plans for outpt podiatry f/u on po zyvox for 6 weeks.         35 minutes spent on discharge planning.

## 2019-03-22 NOTE — DISCHARGE NOTE NURSING/CASE MANAGEMENT/SOCIAL WORK - NSDCDPATPORTLINK_GEN_ALL_CORE
You can access the [facility] Portal, offered by [facility], by registering with the following website: [email or web address]

## 2019-03-22 NOTE — PROGRESS NOTE ADULT - ASSESSMENT
73F PMHx DM2, gout, anxiety, HTN, hypothyroidism, L foot ulcer here with worsening ulceration with chronic osteo L second digit, now on zyvox.    1. OM chronic, L second digit  zyvox x6 weeks per ID  discharge planning  needs podiatry f/u outpt  wound care  2. DM2  controlled off medication  3. HTN  losartan 100  4. Gout  colcichine 0.6  5. Anxiety  klonipin 0.5 tid  6. Hypothyroidism  synthroid 200  7. DVT ppx  subq hep    #Progress Note Handoff:  Pending (specify):  Consults_________, Tests________, Test Results_______, Other_________  Family discussion:  Disposition: Home__x_/SNF___/Other________/Unknown at this time________

## 2019-03-22 NOTE — DISCHARGE NOTE PROVIDER - CARE PROVIDER_API CALL
Nghia Roy (DPM)  Surgery  [address] [place] [address]  Phone: [phone]  Fax: [phone]  Follow Up Time:

## 2019-03-22 NOTE — PROGRESS NOTE ADULT - SUBJECTIVE AND OBJECTIVE BOX
Patient is a 73y old  Female who presents with a chief complaint of pain, swelling    left foot with  ulceration on the 2nd toe---progressive few days duration (21 Mar 2019 11:23)      SUBJECTIVE / OVERNIGHT EVENTS:  no cp, sob, abd pain, fever  no foot pain, discharge, pus, blood    MEDICATIONS  (STANDING):  buDESOnide  80 MICROgram(s)/formoterol 4.5 MICROgram(s) Inhaler 2 Puff(s) Inhalation two times a day  clonazePAM Tablet 0.5 milliGRAM(s) Oral three times a day  colchicine 0.6 milliGRAM(s) Oral daily  heparin  Injectable 5000 Unit(s) SubCutaneous every 12 hours  influenza   Vaccine 0.5 milliLiter(s) IntraMuscular once  levothyroxine 200 MICROGram(s) Oral daily  linezolid    Tablet 600 milliGRAM(s) Oral every 12 hours  losartan 100 milliGRAM(s) Oral daily  pantoprazole    Tablet 40 milliGRAM(s) Oral before breakfast    MEDICATIONS  (PRN):  acetaminophen   Tablet .. 650 milliGRAM(s) Oral every 6 hours PRN Temp greater or equal to 38C (100.4F), Moderate Pain (4 - 6)  ALBUTerol    90 MICROgram(s) HFA Inhaler 2 Puff(s) Inhalation every 6 hours PRN Bronchospasm  diphenhydrAMINE 25 milliGRAM(s) Oral every 6 hours PRN Rash and/or Itching        CAPILLARY BLOOD GLUCOSE        I&O's Summary      PHYSICAL EXAM:  GENERAL: nad, a+o x3, anxious  EYES:  NECK:   CHEST/LUNG: ctab no w/r/r  HEART: rrr no m/g/r  ABDOMEN: soft nt nd  EXTREMITIES:    PSYCH:   NEUROLOGY:   SKIN:    LABS:                        12.7   4.26  )-----------( 240      ( 21 Mar 2019 07:24 )             37.0     03-21    141  |  105  |  8<L>  ----------------------------<  110<H>  4.7   |  26  |  0.8    Ca    8.7      21 Mar 2019 07:24                RADIOLOGY & ADDITIONAL TESTS:    Imaging Personally Reviewed:  Yes    Consultant(s) Notes Reviewed:      Care Discussed with Consultants/Other Providers:    Assessment and Plan   PAST MEDICAL & SURGICAL HISTORY:  Chronic obstructive pulmonary disease, unspecified COPD type: h7p  abg/  Asthma: no recent attacks  Osteoarthritis  Blood clot in vein: 5 y ago with treatment  Hypothyroid  HTN (hypertension)  Depression: no suicide ideation  Arthritis: OA  Hypothyroidism  HTN (hypertension)  H/O hernia repair: may 25 2018  History of cholecystectomy  H/O tubal ligation  S/P tonsillectomy

## 2019-03-22 NOTE — PROGRESS NOTE ADULT - REASON FOR ADMISSION
pain, swelling    left foot with  ulceration on the 2nd toe---progressive few days duration

## 2019-03-22 NOTE — DISCHARGE NOTE PROVIDER - NSDCCPCAREPLAN_GEN_ALL_CORE_FT
PRINCIPAL DISCHARGE DIAGNOSIS  Diagnosis: Osteomyelitis of foot, left, acute  Assessment and Plan of Treatment: Please take zyvox as prescribed for 6 weeks  Need to follow up with Dr. Roy in one week      SECONDARY DISCHARGE DIAGNOSES  Diagnosis: Diabetic foot ulcer  Assessment and Plan of Treatment:

## 2019-03-23 LAB
-  AMPICILLIN/SULBACTAM: SIGNIFICANT CHANGE UP
-  CEFAZOLIN: SIGNIFICANT CHANGE UP
-  CLINDAMYCIN: SIGNIFICANT CHANGE UP
-  DAPTOMYCIN: SIGNIFICANT CHANGE UP
-  ERYTHROMYCIN: SIGNIFICANT CHANGE UP
-  GENTAMICIN: SIGNIFICANT CHANGE UP
-  LINEZOLID: SIGNIFICANT CHANGE UP
-  OXACILLIN: SIGNIFICANT CHANGE UP
-  PENICILLIN: SIGNIFICANT CHANGE UP
-  RIFAMPIN: SIGNIFICANT CHANGE UP
-  TETRACYCLINE: SIGNIFICANT CHANGE UP
-  TRIMETHOPRIM/SULFAMETHOXAZOLE: SIGNIFICANT CHANGE UP
-  VANCOMYCIN: SIGNIFICANT CHANGE UP
CULTURE RESULTS: SIGNIFICANT CHANGE UP
METHOD TYPE: SIGNIFICANT CHANGE UP
ORGANISM # SPEC MICROSCOPIC CNT: SIGNIFICANT CHANGE UP
ORGANISM # SPEC MICROSCOPIC CNT: SIGNIFICANT CHANGE UP
SPECIMEN SOURCE: SIGNIFICANT CHANGE UP

## 2019-03-25 LAB
CULTURE RESULTS: SIGNIFICANT CHANGE UP
SPECIMEN SOURCE: SIGNIFICANT CHANGE UP

## 2019-03-26 DIAGNOSIS — I12.9 HYPERTENSIVE CHRONIC KIDNEY DISEASE WITH STAGE 1 THROUGH STAGE 4 CHRONIC KIDNEY DISEASE, OR UNSPECIFIED CHRONIC KIDNEY DISEASE: ICD-10-CM

## 2019-03-26 DIAGNOSIS — E03.9 HYPOTHYROIDISM, UNSPECIFIED: ICD-10-CM

## 2019-03-26 DIAGNOSIS — Z90.49 ACQUIRED ABSENCE OF OTHER SPECIFIED PARTS OF DIGESTIVE TRACT: ICD-10-CM

## 2019-03-26 DIAGNOSIS — M79.672 PAIN IN LEFT FOOT: ICD-10-CM

## 2019-03-26 DIAGNOSIS — I10 ESSENTIAL (PRIMARY) HYPERTENSION: ICD-10-CM

## 2019-03-26 DIAGNOSIS — E11.621 TYPE 2 DIABETES MELLITUS WITH FOOT ULCER: ICD-10-CM

## 2019-03-26 DIAGNOSIS — F41.9 ANXIETY DISORDER, UNSPECIFIED: ICD-10-CM

## 2019-03-26 DIAGNOSIS — M10.9 GOUT, UNSPECIFIED: ICD-10-CM

## 2019-03-26 DIAGNOSIS — J45.909 UNSPECIFIED ASTHMA, UNCOMPLICATED: ICD-10-CM

## 2019-03-26 DIAGNOSIS — L97.524 NON-PRESSURE CHRONIC ULCER OF OTHER PART OF LEFT FOOT WITH NECROSIS OF BONE: ICD-10-CM

## 2019-03-26 DIAGNOSIS — L03.116 CELLULITIS OF LEFT LOWER LIMB: ICD-10-CM

## 2019-03-26 DIAGNOSIS — M86.672 OTHER CHRONIC OSTEOMYELITIS, LEFT ANKLE AND FOOT: ICD-10-CM

## 2019-03-26 DIAGNOSIS — E66.9 OBESITY, UNSPECIFIED: ICD-10-CM

## 2019-03-26 DIAGNOSIS — N18.3 CHRONIC KIDNEY DISEASE, STAGE 3 (MODERATE): ICD-10-CM

## 2019-03-26 DIAGNOSIS — M19.90 UNSPECIFIED OSTEOARTHRITIS, UNSPECIFIED SITE: ICD-10-CM

## 2019-03-26 DIAGNOSIS — Z88.0 ALLERGY STATUS TO PENICILLIN: ICD-10-CM

## 2019-03-26 DIAGNOSIS — F32.9 MAJOR DEPRESSIVE DISORDER, SINGLE EPISODE, UNSPECIFIED: ICD-10-CM

## 2019-03-26 DIAGNOSIS — E11.69 TYPE 2 DIABETES MELLITUS WITH OTHER SPECIFIED COMPLICATION: ICD-10-CM

## 2019-03-26 DIAGNOSIS — J44.9 CHRONIC OBSTRUCTIVE PULMONARY DISEASE, UNSPECIFIED: ICD-10-CM

## 2019-03-26 DIAGNOSIS — Z98.51 TUBAL LIGATION STATUS: ICD-10-CM

## 2019-03-26 DIAGNOSIS — M20.41 OTHER HAMMER TOE(S) (ACQUIRED), RIGHT FOOT: ICD-10-CM

## 2019-04-04 DIAGNOSIS — E66.01 MORBID (SEVERE) OBESITY DUE TO EXCESS CALORIES: ICD-10-CM

## 2019-05-10 ENCOUNTER — OUTPATIENT (OUTPATIENT)
Dept: OUTPATIENT SERVICES | Facility: HOSPITAL | Age: 74
LOS: 1 days | Discharge: HOME | End: 2019-05-10

## 2019-05-10 DIAGNOSIS — E55.9 VITAMIN D DEFICIENCY, UNSPECIFIED: ICD-10-CM

## 2019-05-10 DIAGNOSIS — Z98.890 OTHER SPECIFIED POSTPROCEDURAL STATES: Chronic | ICD-10-CM

## 2019-05-10 DIAGNOSIS — Z90.89 ACQUIRED ABSENCE OF OTHER ORGANS: Chronic | ICD-10-CM

## 2019-05-10 DIAGNOSIS — Z90.49 ACQUIRED ABSENCE OF OTHER SPECIFIED PARTS OF DIGESTIVE TRACT: Chronic | ICD-10-CM

## 2019-05-10 DIAGNOSIS — E03.9 HYPOTHYROIDISM, UNSPECIFIED: ICD-10-CM

## 2019-05-10 DIAGNOSIS — Z98.51 TUBAL LIGATION STATUS: Chronic | ICD-10-CM

## 2019-05-10 DIAGNOSIS — I10 ESSENTIAL (PRIMARY) HYPERTENSION: ICD-10-CM

## 2019-05-10 DIAGNOSIS — E78.00 PURE HYPERCHOLESTEROLEMIA, UNSPECIFIED: ICD-10-CM

## 2019-07-02 ENCOUNTER — APPOINTMENT (OUTPATIENT)
Dept: BREAST CENTER | Facility: CLINIC | Age: 74
End: 2019-07-02
Payer: MEDICAID

## 2019-07-02 VITALS
WEIGHT: 210 LBS | SYSTOLIC BLOOD PRESSURE: 138 MMHG | HEIGHT: 66 IN | DIASTOLIC BLOOD PRESSURE: 80 MMHG | TEMPERATURE: 98.5 F | BODY MASS INDEX: 33.75 KG/M2

## 2019-07-02 DIAGNOSIS — Z87.09 PERSONAL HISTORY OF OTHER DISEASES OF THE RESPIRATORY SYSTEM: ICD-10-CM

## 2019-07-02 DIAGNOSIS — Z86.79 PERSONAL HISTORY OF OTHER DISEASES OF THE CIRCULATORY SYSTEM: ICD-10-CM

## 2019-07-02 DIAGNOSIS — Z80.3 FAMILY HISTORY OF MALIGNANT NEOPLASM OF BREAST: ICD-10-CM

## 2019-07-02 DIAGNOSIS — Z86.39 PERSONAL HISTORY OF OTHER ENDOCRINE, NUTRITIONAL AND METABOLIC DISEASE: ICD-10-CM

## 2019-07-02 PROCEDURE — 99203 OFFICE O/P NEW LOW 30 MIN: CPT

## 2019-07-02 RX ORDER — LEVOTHYROXINE SODIUM 0.17 MG/1
TABLET ORAL
Refills: 0 | Status: ACTIVE | COMMUNITY

## 2019-07-02 RX ORDER — CLONAZEPAM 2 MG/1
TABLET ORAL
Refills: 0 | Status: ACTIVE | COMMUNITY

## 2019-07-02 RX ORDER — LOSARTAN POTASSIUM 100 MG/1
TABLET, FILM COATED ORAL
Refills: 0 | Status: ACTIVE | COMMUNITY

## 2019-07-02 NOTE — PHYSICAL EXAM
[No Cervical Adenopathy] : no cervical adenopathy [Examined in the supine and seated position] : examined in the supine and seated position [No Supraclavicular Adenopathy] : no supraclavicular adenopathy [No dominant masses] : no dominant masses left breast [No Nipple Retraction] : no left nipple retraction [No Nipple Discharge] : no left nipple discharge [___cm] : ~M [unfilled] ~Ucm upper inner quadrant mass [No Axillary Lymphadenopathy] : no left axillary lymphadenopathy [No Edema] : no edema [Full ROM] : full range of motion [No Swelling] : no swelling [No Rashes] : no rashes [No Ulceration] : no ulceration

## 2019-07-16 ENCOUNTER — OUTPATIENT (OUTPATIENT)
Dept: OUTPATIENT SERVICES | Facility: HOSPITAL | Age: 74
LOS: 1 days | Discharge: HOME | End: 2019-07-16

## 2019-07-16 ENCOUNTER — LABORATORY RESULT (OUTPATIENT)
Age: 74
End: 2019-07-16

## 2019-07-16 DIAGNOSIS — Z90.89 ACQUIRED ABSENCE OF OTHER ORGANS: Chronic | ICD-10-CM

## 2019-07-16 DIAGNOSIS — Z90.49 ACQUIRED ABSENCE OF OTHER SPECIFIED PARTS OF DIGESTIVE TRACT: Chronic | ICD-10-CM

## 2019-07-16 DIAGNOSIS — Z98.890 OTHER SPECIFIED POSTPROCEDURAL STATES: Chronic | ICD-10-CM

## 2019-07-16 DIAGNOSIS — R89.7 ABNORMAL HISTOLOGICAL FINDINGS IN SPECIMENS FROM OTHER ORGANS, SYSTEMS AND TISSUES: ICD-10-CM

## 2019-07-16 DIAGNOSIS — Z98.51 TUBAL LIGATION STATUS: Chronic | ICD-10-CM

## 2019-07-25 ENCOUNTER — FORM ENCOUNTER (OUTPATIENT)
Age: 74
End: 2019-07-25

## 2019-07-25 NOTE — DATA REVIEWED
[FreeTextEntry1] : Study Date:   22 May 2019 13:27 \par Referring Phys.:  LORI RIZVI Performing Location:   [facility]  \par EXAM:  MAMMO TOMOSYNTHESIS DIAGNOSTIC BILATERAL \par OVERALL IMPRESSION:\par In the 2:00 right breast there are 2 adjacent but separate suspicious solid masses, the larger measuring 1.9 cm and the smaller 1 cm. Ultrasound-guided biopsy of both lesions is recommended.\par 0.8 cm solid nodule in the 1:00 anterior left breast, ultrasound-guided core biopsy recommended.\par Biopsy of both breast(s) is recommended. A letter will be sent to the patient to return for a biopsy.\par The findings and recommendations were discussed with the patient by the dictating radiologist. The patient speaks very little English and is fluent Luxembourgish, translation was provided by the patient's adult friend who accompanied her to the appointment.\par BI-RADS 4: SUSPICIOUS\par MAMMO TOMOSYNTHESIS DIAGNOSTIC BILATERAL, US BREAST COMPLETE BILATERAL\par Clinical Breast Exam: Patient does report clinical breast exam in the last year.\par Clinical Indication: Patient has a palpable lump in the right breast. (accession [id number]), Clinical Indication: Abnormal Findings on Mammogram. (accession [id number])\par No prior mammograms for comparison\par MAMMOGRAM:\par Tomosynthesis and 2D imaging of the breast(s) were performed. Current study was also evaluated with a computer aided detection (CAD) system.\par Breast Density: There are scattered areas of fibroglandular density.\par Examination of the right breast reveals a 1.9 cm high density mass in the 2:00 location at 8 cm from the nipple. There is an adjacent suspicious 1 cm mass in the 2:00 location at 10 cm from the nipple. Both are solid on subsequent ultrasound.\par Borderline appearing right axillary lymph node is demonstrated on the mammogram.\par Examination of the left breast reveals a 0.8 cm oval nodule in the 1:00 location anteriorly at 5 cm from the nipple. This finding is solid on subsequent ultrasound.\par US BREAST COMPLETE BILATERAL\par Ultrasound evaluation was performed including examination of all four quadrants of the breast(s) and the retroareolar regions.\par Color flow, Gray scale and real-time ultrasound of both breasts was performed.\par Right breast:\par There are is a 1.9 cm ill-defined solid mass in the 2:00 location at 8 cm from the nipple. This would account for the patient's palpable lump and for the mass on the mammogram. The finding is suspicious, ultrasound-guided biopsy advised.\par There is an adjacent ill-defined 1 cm solid mass in the 2:00 location at 10 cm from the nipple. This finding is suspicious, ultrasound-guided biopsy advised\par Left breast:\par There is a 0.8 cm circumscribed solid nodule in the 1:00 location at 5 cm from the nipple. Ultrasound-guided core biopsy is advised.\par Axilla:\par Borderline right axillary lymph nodes are demonstrated. Normal-appearing left axillary lymph nodes are demonstrated. The possibility of any craig involvement cannot be excluded on the basis of imaging and the patient may require sentinel node assessment.\par Electronic Signature: I personally reviewed the images and agree with this report. Final Report: Dictated by and Signed by Attending Jaelyn Matias MD 5/22/2019 2:54 PM\par \par \par \par \par  \par Study Date:   12 Jun 2019 09:57 \par Referring Phys.:  LORI RIZVI Performing Location:   [facility]  \par EXAM:  US BREAST CORE BIOPSY \par Pathology Report Received From [facility]:\par The pathology report of the right breast right biopsy dated 6/12/2019 indicated that the targets at 2:00 axis at 8 cm from nipple and at 2:00 axis at 10 cm from the nipple are MALIGNANT\par DIAGNOSIS:\par 1. 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 BREAST, RIGHT, 2 O'CLOCK, 10 CM FN, B: CORE BIOPSY\par - INVASIVE DUCTAL CARCINOMA, WELL TO MODERATELY-DIFFERENTIATED\par Comment: P63 (-) immunostain performed on Block 2A supports the\par diagnosis.\par The pathology results are concordant with the imaging findings.\par As per patient's request, biopsy results were given to her son Porfirio Miranda. . She has been advised to contact your office and to arrange for SURGICAL AND ONCOLOGIC CONSULTATION AND MANAGEMENT.\par Surgical consultation of the right breast(s) is recommended. _\par Electronic Signature: I personally reviewed the images and agree with this report. Final Report: Dictated by and Signed by Attending Leydi Sommer MD 6/19/2019 2:38 PM\par *******END OF ADDENDUM******\par \par \par  \par \par Pathology Report Received From [facility]:\par The pathology report of the left breast ultrasound-guided core biopsy dated 6/12/2019 indicated that the target at 1:00 axis is BENIGN\par BREAST, LEFT, 1 O'CLOCK, 5 CM FN: CORE BIOPSY\par - FIBROADENOMA\par - DILATED BENIGN DUCTS\par The pathology results are concordant with the imaging findings.\par A 6 month follow-up left breast ultrasound is advised. As per patient's request, biopsy results were given to her son Porfirio Miranda.\par Surgical consultation for the malignant results of the right breast is advised.\par Electronic Signature: I personally reviewed the images and agree with this report. Final Report: Dictated by and Signed by Attending Leydi Sommer MD 6/19/2019 2:39 PM\par *******END OF ADDENDUM******\par

## 2019-07-25 NOTE — CONSULT LETTER
[Dear  ___] : Dear  [unfilled], [Consult Letter:] : I had the pleasure of evaluating your patient, [unfilled]. [Please see my note below.] : Please see my note below. [Consult Closing:] : Thank you very much for allowing me to participate in the care of this patient.  If you have any questions, please do not hesitate to contact me. [Sincerely,] : Sincerely, [FreeTextEntry2] : Glenroy Hutchins M.D.\par [address] [place] [address], [address]\[place] [address] [FreeTextEntry3] : Geno Candelario M.D., F.A.C.S.\par

## 2019-07-25 NOTE — ASSESSMENT
[FreeTextEntry1] : Esther is a 73 year old female who presents with a new diagnosis of right breast cancer.  She recently underwent two ultrasound-guided NC biopsies in the right breast.  The pathology of both mass in the UIQ demonstrated invasive ductal carcinoma which is ER/DC positive and Her2-cb negative. She has strong family history of breast cancer.  On examination, she has an approximately 2 cm mass at 2:00 right breast.  There is no palpable mass in the left breast, axillary lymphadenopathy, nipple discharge or inversion.  I reviewed her recent imaging.\par \par Her son was very concerned about her receiving the diagnosis and preferred to tell her himself at home.  I discussed with him the diagnosis and options of breast conserving therapy versus mastectomy.  She will need to have a preoperative bilateral breast MRI.  After the MRI, she will return to the office to discuss her surgical options and finalize her decision for treatment.\par \par I spent a total of 30 minutes of face to face time with this patient, greater than 50% of which was spent in counseling and/or coordination of care.  All of her questions were appropriately answered.\par

## 2019-07-25 NOTE — HISTORY OF PRESENT ILLNESS
[FreeTextEntry1] : PCP: Glenroy Hutchins MD\par \par Patient with right breast invasive well to moderately differentiated ductal carcinoma on ultrasound guided core biopsy 6/12/19; 2:00 N8, 19 mm (cork).  ER/WV (+)  HER2 (-).\par \par Right breast invasive well to moderately differentiated ductal carcinoma on ultrasound guided core biopsy 6/12/19; 2:00 N10, 10 mm (tophat).   ER/WV (+)  HER2 (-).\par \par Left breast fibroadenoma on ultrasound guided core biopsy 6/12/19; 1:00 N5, (hourglass).  Findings are benign concordant and short term follow up left breast ultrasound recommended in six months.

## 2019-07-26 ENCOUNTER — OUTPATIENT (OUTPATIENT)
Dept: OUTPATIENT SERVICES | Facility: HOSPITAL | Age: 74
LOS: 1 days | Discharge: HOME | End: 2019-07-26
Payer: MEDICAID

## 2019-07-26 DIAGNOSIS — Z90.49 ACQUIRED ABSENCE OF OTHER SPECIFIED PARTS OF DIGESTIVE TRACT: Chronic | ICD-10-CM

## 2019-07-26 DIAGNOSIS — Z90.89 ACQUIRED ABSENCE OF OTHER ORGANS: Chronic | ICD-10-CM

## 2019-07-26 DIAGNOSIS — Z98.890 OTHER SPECIFIED POSTPROCEDURAL STATES: Chronic | ICD-10-CM

## 2019-07-26 DIAGNOSIS — C50.411 MALIGNANT NEOPLASM OF UPPER-OUTER QUADRANT OF RIGHT FEMALE BREAST: ICD-10-CM

## 2019-07-26 DIAGNOSIS — Z98.51 TUBAL LIGATION STATUS: Chronic | ICD-10-CM

## 2019-07-26 PROCEDURE — 77049 MRI BREAST C-+ W/CAD BI: CPT | Mod: 26

## 2019-08-12 ENCOUNTER — APPOINTMENT (OUTPATIENT)
Dept: BREAST CENTER | Facility: CLINIC | Age: 74
End: 2019-08-12
Payer: MEDICAID

## 2019-08-12 PROCEDURE — 99213 OFFICE O/P EST LOW 20 MIN: CPT

## 2019-08-12 NOTE — DATA REVIEWED
[FreeTextEntry1] : EXAM: MR BREAST WAW IC BI \par PROCEDURE DATE: 07/26/2019 \par INTERPRETATION: Clinical History / Reason for exam: Extent of disease \par evaluation. \par Technique: Breast MRI is performed at 1.5 T with the patient prone and the \par breasts in a dedicated breast coil. Following a 3 plane localizer, sagittal \par T1 weighted, fat-saturated T1 weighted and fat saturated T2-weighted \par sequence; dynamic contrast enhanced sagittal images; and delayed \par post-contrast axial fat-saturated T1 weighted images were obtained. 9 mL \par gadolinium contrast was injected and 1 mL was discarded. Subtraction and MIP \par images were reviewed. [employer] software was used in interpretation. \par Comparison: 6/12/2019 mammogram \par Findings: \par Amount of fibroglandular tissue: Scattered fibroglandular tissue \par Background parenchymal enhancement: Mild, Symmetric \par RIGHT BREAST: \par There is a 3.2 x 2.2 x 1.9 cm irregular mass with signal void representing \par biopsy-proven malignancy. About 1 cm posterior to this mass is a 1.4 x 1 x \par 0.8 cm irregular mass with signal void present in biopsy proven malignancy. \par The nipple and skin appear normal. There is no axillary adenopathy. \par LEFT BREAST: \par There is a 5.1 x 2.7 x 3.2 cm area of clumped nonmass enhancement in the \par superior lateral aspect of the left breast (series 13 image 37 and series \par 1200 image 116. MRI guided biopsy recommended. \par Signal void from prior benign biopsy is noted at the retroareolar region. \par The nipple and skin appear normal. There is no axillary adenopathy. \par Impression: \par 1. A 5 cm area of clumped nonmass enhancement in this. Lateral aspect of the \par left breast. MRI guided biopsy recommended. \par 2. Two adjacent masses measuring 3.2 and 1.4 cm respectively in the \par superomedial aspect of the right breast representing biopsy-proven \par malignancies. \par Recommendation: MRI guided biopsy of left breast nonmass enhancement.. \par BI-RADS Category 4: Suspicious \par Findings were discussed with ROSIE Bright on 7/31/2019 at 10:30 AM by Dr. cintia Zafar. \par GOOD ZAFAR M.D., ATTENDING RADIOLOGIST \par This document has been electronically signed. Jul 31 2019 10:59AM \par

## 2019-08-12 NOTE — REVIEW OF SYSTEMS
[Fever] : no fever [Breast Pain] : no breast pain [Chills] : no chills [Breast Swelling] : no breast swelling [Breast Lump] : no breast lump [Breast Warmth] : no breast warmth [Breast Reddening] : no reddening of the breast [Breast Itching] : no breast itching [Enlargement] : no breast enlargement [Decreasing In Size] : breast size not decreasing [Dimpling Of Skin] : no dimpling of breast skin [Nipple Discharge] : no nipple discharge ['Orange Peel' Appearance] : no 'orange peel' appearance of breast skin [Nipple Inverted] : no inversion of the nipple

## 2019-08-12 NOTE — HISTORY OF PRESENT ILLNESS
[FreeTextEntry1] : PCP: Glenroy Hutchins MD\par \par Patient with right breast invasive well to moderately differentiated ductal carcinoma on ultrasound guided core biopsy 6/12/19; 2:00 N8, 19 mm (cork).  \par Estrogen receptor positive, \par Progesterone receptor positive, \par HER2 negative, 1+\par Ki-67: 10-15%\par \par Right breast invasive well to moderately differentiated ductal carcinoma on ultrasound guided core biopsy 6/12/19; 2:00 N10, 10 mm (tophat).   \par Estrogen receptor positive, \par Progesterone receptor positive, \par HER2 negative, 1+\par Ki-67: 10-15%\par \par Left breast fibroadenoma on ultrasound guided core biopsy 6/12/19; 1:00 N5, (hourglass).  Findings are benign concordant and short term follow up left breast ultrasound recommended in six months.  \par \par No prior complaints related to the breasts. \par Her family history is significant for her sister with breast cancer at 68; her maternal aunt with breast cancer at 65; and her niece (sister's daughter) with breast cancer at 54.  \par \par Bilateral Breast MRI 7/26/19: 5 cm area of clumped non mass enhancement in the lateral aspect of the left breast; MR guided core biopsy recommended.  Per patient and her son, she prefers bilateral mastectomy and therefore biopsy was not indicated.

## 2019-08-13 VITALS
SYSTOLIC BLOOD PRESSURE: 130 MMHG | BODY MASS INDEX: 37.32 KG/M2 | TEMPERATURE: 98.4 F | WEIGHT: 224 LBS | HEIGHT: 65 IN | DIASTOLIC BLOOD PRESSURE: 82 MMHG

## 2019-08-17 ENCOUNTER — EMERGENCY (EMERGENCY)
Facility: HOSPITAL | Age: 74
LOS: 0 days | Discharge: HOME | End: 2019-08-17
Attending: EMERGENCY MEDICINE | Admitting: EMERGENCY MEDICINE
Payer: MEDICAID

## 2019-08-17 VITALS
HEIGHT: 64 IN | RESPIRATION RATE: 18 BRPM | WEIGHT: 199.96 LBS | TEMPERATURE: 98 F | DIASTOLIC BLOOD PRESSURE: 76 MMHG | OXYGEN SATURATION: 99 % | SYSTOLIC BLOOD PRESSURE: 149 MMHG | HEART RATE: 80 BPM

## 2019-08-17 DIAGNOSIS — E03.9 HYPOTHYROIDISM, UNSPECIFIED: ICD-10-CM

## 2019-08-17 DIAGNOSIS — R51 HEADACHE: ICD-10-CM

## 2019-08-17 DIAGNOSIS — Z90.89 ACQUIRED ABSENCE OF OTHER ORGANS: Chronic | ICD-10-CM

## 2019-08-17 DIAGNOSIS — Z98.890 OTHER SPECIFIED POSTPROCEDURAL STATES: Chronic | ICD-10-CM

## 2019-08-17 DIAGNOSIS — Z90.49 ACQUIRED ABSENCE OF OTHER SPECIFIED PARTS OF DIGESTIVE TRACT: Chronic | ICD-10-CM

## 2019-08-17 DIAGNOSIS — R07.89 OTHER CHEST PAIN: ICD-10-CM

## 2019-08-17 DIAGNOSIS — I10 ESSENTIAL (PRIMARY) HYPERTENSION: ICD-10-CM

## 2019-08-17 DIAGNOSIS — Z88.0 ALLERGY STATUS TO PENICILLIN: ICD-10-CM

## 2019-08-17 DIAGNOSIS — Z98.51 TUBAL LIGATION STATUS: Chronic | ICD-10-CM

## 2019-08-17 LAB
ALBUMIN SERPL ELPH-MCNC: 4.4 G/DL — SIGNIFICANT CHANGE UP (ref 3.5–5.2)
ALP SERPL-CCNC: 76 U/L — SIGNIFICANT CHANGE UP (ref 30–115)
ALT FLD-CCNC: 13 U/L — SIGNIFICANT CHANGE UP (ref 0–41)
ANION GAP SERPL CALC-SCNC: 13 MMOL/L — SIGNIFICANT CHANGE UP (ref 7–14)
AST SERPL-CCNC: 20 U/L — SIGNIFICANT CHANGE UP (ref 0–41)
BILIRUB SERPL-MCNC: 0.3 MG/DL — SIGNIFICANT CHANGE UP (ref 0.2–1.2)
BUN SERPL-MCNC: 20 MG/DL — SIGNIFICANT CHANGE UP (ref 10–20)
CALCIUM SERPL-MCNC: 9.5 MG/DL — SIGNIFICANT CHANGE UP (ref 8.5–10.1)
CHLORIDE SERPL-SCNC: 101 MMOL/L — SIGNIFICANT CHANGE UP (ref 98–110)
CO2 SERPL-SCNC: 22 MMOL/L — SIGNIFICANT CHANGE UP (ref 17–32)
CREAT SERPL-MCNC: 0.9 MG/DL — SIGNIFICANT CHANGE UP (ref 0.7–1.5)
GLUCOSE SERPL-MCNC: 114 MG/DL — HIGH (ref 70–99)
HCT VFR BLD CALC: 42.1 % — SIGNIFICANT CHANGE UP (ref 37–47)
HGB BLD-MCNC: 14.5 G/DL — SIGNIFICANT CHANGE UP (ref 12–16)
MAGNESIUM SERPL-MCNC: 1.7 MG/DL — LOW (ref 1.8–2.4)
MCHC RBC-ENTMCNC: 30.5 PG — SIGNIFICANT CHANGE UP (ref 27–31)
MCHC RBC-ENTMCNC: 34.4 G/DL — SIGNIFICANT CHANGE UP (ref 32–37)
MCV RBC AUTO: 88.6 FL — SIGNIFICANT CHANGE UP (ref 81–99)
NRBC # BLD: 0 /100 WBCS — SIGNIFICANT CHANGE UP (ref 0–0)
NT-PROBNP SERPL-SCNC: 77 PG/ML — SIGNIFICANT CHANGE UP (ref 0–300)
PLATELET # BLD AUTO: 268 K/UL — SIGNIFICANT CHANGE UP (ref 130–400)
POTASSIUM SERPL-MCNC: 4.4 MMOL/L — SIGNIFICANT CHANGE UP (ref 3.5–5)
POTASSIUM SERPL-SCNC: 4.4 MMOL/L — SIGNIFICANT CHANGE UP (ref 3.5–5)
PROT SERPL-MCNC: 6.8 G/DL — SIGNIFICANT CHANGE UP (ref 6–8)
RBC # BLD: 4.75 M/UL — SIGNIFICANT CHANGE UP (ref 4.2–5.4)
RBC # FLD: 13 % — SIGNIFICANT CHANGE UP (ref 11.5–14.5)
SODIUM SERPL-SCNC: 136 MMOL/L — SIGNIFICANT CHANGE UP (ref 135–146)
TROPONIN T SERPL-MCNC: <0.01 NG/ML — SIGNIFICANT CHANGE UP
WBC # BLD: 10.1 K/UL — SIGNIFICANT CHANGE UP (ref 4.8–10.8)
WBC # FLD AUTO: 10.1 K/UL — SIGNIFICANT CHANGE UP (ref 4.8–10.8)

## 2019-08-17 PROCEDURE — 71045 X-RAY EXAM CHEST 1 VIEW: CPT | Mod: 26

## 2019-08-17 PROCEDURE — 70450 CT HEAD/BRAIN W/O DYE: CPT | Mod: 26

## 2019-08-17 PROCEDURE — 99285 EMERGENCY DEPT VISIT HI MDM: CPT

## 2019-08-17 RX ORDER — ACETAMINOPHEN 500 MG
975 TABLET ORAL ONCE
Refills: 0 | Status: COMPLETED | OUTPATIENT
Start: 2019-08-17 | End: 2019-08-17

## 2019-08-17 RX ORDER — SODIUM CHLORIDE 9 MG/ML
1000 INJECTION, SOLUTION INTRAVENOUS ONCE
Refills: 0 | Status: COMPLETED | OUTPATIENT
Start: 2019-08-17 | End: 2019-08-17

## 2019-08-17 RX ORDER — DEXAMETHASONE 0.5 MG/5ML
10 ELIXIR ORAL ONCE
Refills: 0 | Status: COMPLETED | OUTPATIENT
Start: 2019-08-17 | End: 2019-08-17

## 2019-08-17 RX ADMIN — Medication 975 MILLIGRAM(S): at 21:58

## 2019-08-17 RX ADMIN — Medication 10 MILLIGRAM(S): at 21:57

## 2019-08-17 RX ADMIN — SODIUM CHLORIDE 1000 MILLILITER(S): 9 INJECTION, SOLUTION INTRAVENOUS at 21:58

## 2019-08-17 NOTE — ED ADULT NURSE NOTE - NSIMPLEMENTINTERV_GEN_ALL_ED
Addendum  created 05/23/17 [id number] by Kera Mckeon MD    Anesthesia Intra SmartForms edited      
Implemented All Universal Safety Interventions:  [place] to call system. Call bell, personal items and telephone within reach. Instruct patient to call for assistance. Room bathroom lighting operational. Non-slip footwear when patient is off stretcher. Physically safe environment: no spills, clutter or unnecessary equipment. Stretcher in lowest position, wheels locked, appropriate side rails in place.

## 2019-08-17 NOTE — ED PROVIDER NOTE - ATTENDING CONTRIBUTION TO CARE
I personally evaluated the patient. I reviewed the Resident’s or Physician Assistant’s note (as assigned above), and agree with the findings and plan except as documented in my note.  Chart reviewed. H/O HTN, COPD, breast CA, presents with right facial pain radiating to chest since yesterday. Exam shows alert patient in no distress, HEENT +reproducible tenderness right facial area, neck supple, lungs clear, RR S1S2, +reproducible tenderness right upper chest, abdo soft NT +BS, no CCE, no CCE, no rash, neuro no deficits.

## 2019-08-17 NOTE — ED PROVIDER NOTE - OBJECTIVE STATEMENT
73 year old female hx HTN, hypothyroid, asthma, COPD presenting with right sided facial pain. Patient states two year ago she received injections in that side of her face d/t drooping. She presents today for right sided forehead, cheek, and mandibular pain. Pain started at rest last night, moderate, unchanged. She state sshe took tylenol yesterday with some relief. Patient also admits to midsternal chest pain which radiates to her neck described as pressure, no pall/prov factors. No fevers, chills, cough, abd pain, n/v/d/ dysuria. She also admits to sore throat, no neck pain.

## 2019-08-17 NOTE — ED PROVIDER NOTE - NS ED ROS FT
Review of Systems         Constitutional: (-) fever (-) chills (-) weakness       Head: (-) trauma       EENT: (-) visual changes (+) sore throat       Cardiovascular: (+) chest pain (-) syncope       Respiratory: (-) cough, (-) shortness of breath       Gastrointestinal: (-) abdominal pain (-) vomiting (-) diarrhea (-) nausea (-) constipation       Genitourinary: (-) dysuria        Musculoskeletal: (-) neck pain (-) back pain (-) joint pain       Integumentary: (-) rash       Neurological: (-) headache (-) altered mental status (-) dizziness (-) paresthesias       Psych: (-) psych history

## 2019-08-17 NOTE — ED PROVIDER NOTE - NSFOLLOWUPINSTRUCTIONS_ED_ALL_ED_FT
-Follow up with Dr. Hutchins in 1-3 days  -Follow up Dr. Guerrero Kramer in 1-3 days  -Take 400-800 mg of Ibuprofen every 6-8 hours as needed for pain with food; food first, then medicine  -Return to ED for worsening symptoms or concerns.    Neuralgia trigeminal    La neuralgia del trigémino es un trastorno nervioso que causa ataques de dolor facial severo. Los ataques gann desde unos pocos segundos hasta varios minutos. Pueden suceder whitney días, semanas o meses y luego desaparecen whitney meses o años. La neuralgia del trigémino también se llama tic douloureux.    ¿Cuales son las causas?  Esta condición es causada por el daño a un nervio en la yola que se llama nervio trigémino. Un ataque puede ser provocado por:  Hablando.  Masticación.  Maquillarse.  Lavarte la yola.  Afeitarse la yola.  Cepillando tus dientes.  Tocando tu yola.  ¿Qué aumenta el riesgo?  Esta condición es más probable que se desarrolle en:  [place]ay.  Personas de 50 años de edad o mayores.  ¿Cuáles son los signos o síntomas?  El síntoma principal de esta afección es dolor en la mandíbula, los labios, los ojos, la nariz, el cuero cabelludo, la frente y la yola. El dolor puede ser intenso, punzante, eléctrico o similar a un shock.    ¿Cómo se diagnostica esto?  Esta condición se diagnostica con un examen físico. Se puede hacer letitia tomografía computarizada o letitia resonancia magnética para descartar otras afecciones que pueden causar dolor facial.    ¿Cómo se trata esto?  Esta condición puede tratarse con:  Rema las cosas que desencadenan tus ataques.  Medicina para el dolor.  Cirugía. [place] puede hacerse en casos severos si otro tratamiento médico no proporciona alivio.  Siga estas instrucciones en casa:  [place] medicamentos de venta sheree y recetados solo según lo indicado por hinojosa proveedor de atención médica.  Si desea quedar embarazada, hable con hinojosa proveedor de atención médica antes de comenzar a intentar quedar embarazada.  Rema las cosas que desencadenan tus ataques. Puede ayudar a:  Mastica el lado no afectado de tu boca.  Rema tocarte la yola.  Evite las ráfagas de aire caliente o frío.  Póngase en contacto con un proveedor de atención médica si:  Hinojosa medicamento para el dolor no está ayudando.  Desarrolla síntomas nuevos e inexplicables, quirino:  Visión doble.  Debilidad facial  Cambios en la audición o el equilibrio.  Te quedas embarazada.  Obtenga ayuda de inmediato si:  Hinojosa dolor es insoportable y hinojosa analgésico no lo ayuda.

## 2019-08-17 NOTE — ED PROVIDER NOTE - PHYSICAL EXAMINATION
Physical Exam    Vital Signs: I have reviewed the initial vital signs  Constitutional: well-nourished, appears stated age, no acute distress  EENT: TTP in right forehead, cheek, and temple. Head atraumatic, normocephalic. Conjunctiva pink, Sclera clear, PERRLA, EOMI. Mucous membranes moist, no exudates or lesions noted, uvula midline.  Cardiovascular: S1 and S2 present, regular rate, regular rhythm.   Respiratory: unlabored respiratory effort, clear to auscultation bilaterally no wheezing, rales and rhonchi  Gastrointestinal: soft, non-tender abdomen. No guarding or rebound tenderness  Musculoskeletal: supple nontender neck, no midline tenderness, no joint pain  Integumentary: warm, dry, no rash  Neurologic: A & O x 3, CN II-XII grossly intact, all extremities’ motor and sensory functions grossly intact  Psychiatric: appropriate mood, appropriate affect

## 2019-08-17 NOTE — ED ADULT TRIAGE NOTE - WEIGHT IN KG
90.7 Helical Rim Advancement Flap Text: The defect edges were debeveled with a #15 blade scalpel.  Given the location of the defect and the proximity to free margins (helical rim) a double helical rim advancement flap was deemed most appropriate.  Using a sterile surgical marker, the appropriate advancement flaps were drawn incorporating the defect and placing the expected incisions between the helical rim and antihelix where possible.  The area thus outlined was incised through and through with a #15 scalpel blade.  With a skin hook and iris scissors, the flaps were gently and sharply undermined and freed up.

## 2019-08-17 NOTE — ED ADULT NURSE NOTE - PMH
Arthritis  OA  Asthma  no recent attacks  Bilateral breast cancer    Blood clot in vein  5 y ago with treatment  Chronic obstructive pulmonary disease, unspecified COPD type  h7p  abg/  Depression  no suicide ideation  HTN (hypertension)    Hypothyroid    Osteoarthritis

## 2019-08-17 NOTE — ED PROVIDER NOTE - CARE PROVIDER_API CALL
Yogesh Yadav)  EEGEpilepsy; Neurology  [address] [place] [address], [address]  [place] [address]  Phone: [phone]  Fax: [phone]  Follow Up Time: 1-3 Days

## 2019-09-01 ENCOUNTER — OUTPATIENT (OUTPATIENT)
Dept: OUTPATIENT SERVICES | Facility: HOSPITAL | Age: 74
LOS: 1 days | End: 2019-09-01

## 2019-09-01 DIAGNOSIS — Z98.51 TUBAL LIGATION STATUS: Chronic | ICD-10-CM

## 2019-09-01 DIAGNOSIS — Z90.89 ACQUIRED ABSENCE OF OTHER ORGANS: Chronic | ICD-10-CM

## 2019-09-01 DIAGNOSIS — Z98.890 OTHER SPECIFIED POSTPROCEDURAL STATES: Chronic | ICD-10-CM

## 2019-09-01 DIAGNOSIS — Z90.49 ACQUIRED ABSENCE OF OTHER SPECIFIED PARTS OF DIGESTIVE TRACT: Chronic | ICD-10-CM

## 2019-09-02 ENCOUNTER — FORM ENCOUNTER (OUTPATIENT)
Age: 74
End: 2019-09-02

## 2019-09-03 ENCOUNTER — OUTPATIENT (OUTPATIENT)
Dept: OUTPATIENT SERVICES | Facility: HOSPITAL | Age: 74
LOS: 1 days | Discharge: HOME | End: 2019-09-03
Payer: MEDICAID

## 2019-09-03 VITALS
OXYGEN SATURATION: 97 % | TEMPERATURE: 98 F | HEIGHT: 64 IN | HEART RATE: 67 BPM | DIASTOLIC BLOOD PRESSURE: 74 MMHG | SYSTOLIC BLOOD PRESSURE: 134 MMHG | WEIGHT: 224.87 LBS | RESPIRATION RATE: 15 BRPM

## 2019-09-03 DIAGNOSIS — Z01.818 ENCOUNTER FOR OTHER PREPROCEDURAL EXAMINATION: ICD-10-CM

## 2019-09-03 DIAGNOSIS — Z98.890 OTHER SPECIFIED POSTPROCEDURAL STATES: Chronic | ICD-10-CM

## 2019-09-03 DIAGNOSIS — Z98.51 TUBAL LIGATION STATUS: Chronic | ICD-10-CM

## 2019-09-03 DIAGNOSIS — Z90.49 ACQUIRED ABSENCE OF OTHER SPECIFIED PARTS OF DIGESTIVE TRACT: Chronic | ICD-10-CM

## 2019-09-03 DIAGNOSIS — Z90.89 ACQUIRED ABSENCE OF OTHER ORGANS: Chronic | ICD-10-CM

## 2019-09-03 DIAGNOSIS — C50.411 MALIGNANT NEOPLASM OF UPPER-OUTER QUADRANT OF RIGHT FEMALE BREAST: ICD-10-CM

## 2019-09-03 LAB
APTT BLD: 29.3 SEC — SIGNIFICANT CHANGE UP (ref 27–39.2)
INR BLD: 1.02 RATIO — SIGNIFICANT CHANGE UP (ref 0.65–1.3)
PROTHROM AB SERPL-ACNC: 11.7 SEC — SIGNIFICANT CHANGE UP (ref 9.95–12.87)

## 2019-09-03 PROCEDURE — 71046 X-RAY EXAM CHEST 2 VIEWS: CPT | Mod: 26

## 2019-09-03 PROCEDURE — 93010 ELECTROCARDIOGRAM REPORT: CPT

## 2019-09-03 NOTE — H&P PST ADULT - RS GEN PE MLT RESP DETAILS PC
no chest wall tenderness/good air movement/respirations non-labored/normal/breath sounds equal/airway patent/clear to auscultation bilaterally

## 2019-09-03 NOTE — H&P PST ADULT - NSICDXPASTMEDICALHX_GEN_ALL_CORE_FT
PAST MEDICAL HISTORY:  Anxiety     Arthritis OA    Asthma no recent attacks    Bilateral breast cancer     Blood clot in vein 5 y ago with treatment    Cancer of breast     Chronic obstructive pulmonary disease, unspecified COPD type h7p  abg/    Depression no suicide ideation    HTN (hypertension)     Hypothyroid     Osteoarthritis

## 2019-09-09 ENCOUNTER — OUTPATIENT (OUTPATIENT)
Dept: OUTPATIENT SERVICES | Facility: HOSPITAL | Age: 74
LOS: 1 days | Discharge: HOME | End: 2019-09-09

## 2019-09-09 DIAGNOSIS — Z98.51 TUBAL LIGATION STATUS: Chronic | ICD-10-CM

## 2019-09-09 DIAGNOSIS — Z90.89 ACQUIRED ABSENCE OF OTHER ORGANS: Chronic | ICD-10-CM

## 2019-09-09 DIAGNOSIS — Z98.890 OTHER SPECIFIED POSTPROCEDURAL STATES: Chronic | ICD-10-CM

## 2019-09-09 DIAGNOSIS — Z02.9 ENCOUNTER FOR ADMINISTRATIVE EXAMINATIONS, UNSPECIFIED: ICD-10-CM

## 2019-09-09 DIAGNOSIS — Z90.49 ACQUIRED ABSENCE OF OTHER SPECIFIED PARTS OF DIGESTIVE TRACT: Chronic | ICD-10-CM

## 2019-09-09 PROBLEM — C50.919 MALIGNANT NEOPLASM OF UNSPECIFIED SITE OF UNSPECIFIED FEMALE BREAST: Chronic | Status: ACTIVE | Noted: 2019-09-03

## 2019-09-09 PROBLEM — F41.9 ANXIETY DISORDER, UNSPECIFIED: Chronic | Status: ACTIVE | Noted: 2019-09-03

## 2019-09-09 LAB — BLD GP AB SCN SERPL QL: SIGNIFICANT CHANGE UP

## 2019-09-12 DIAGNOSIS — Z71.89 OTHER SPECIFIED COUNSELING: ICD-10-CM

## 2019-09-16 ENCOUNTER — FORM ENCOUNTER (OUTPATIENT)
Age: 74
End: 2019-09-16

## 2019-09-17 ENCOUNTER — OUTPATIENT (OUTPATIENT)
Dept: OUTPATIENT SERVICES | Facility: HOSPITAL | Age: 74
LOS: 1 days | Discharge: HOME | End: 2019-09-17
Payer: MEDICAID

## 2019-09-17 DIAGNOSIS — Z90.89 ACQUIRED ABSENCE OF OTHER ORGANS: Chronic | ICD-10-CM

## 2019-09-17 DIAGNOSIS — Z98.890 OTHER SPECIFIED POSTPROCEDURAL STATES: Chronic | ICD-10-CM

## 2019-09-17 DIAGNOSIS — Z98.51 TUBAL LIGATION STATUS: Chronic | ICD-10-CM

## 2019-09-17 DIAGNOSIS — C50.919 MALIGNANT NEOPLASM OF UNSPECIFIED SITE OF UNSPECIFIED FEMALE BREAST: ICD-10-CM

## 2019-09-17 DIAGNOSIS — Z90.49 ACQUIRED ABSENCE OF OTHER SPECIFIED PARTS OF DIGESTIVE TRACT: Chronic | ICD-10-CM

## 2019-09-17 PROCEDURE — 78195 LYMPH SYSTEM IMAGING: CPT | Mod: 26

## 2019-09-18 ENCOUNTER — RESULT REVIEW (OUTPATIENT)
Age: 74
End: 2019-09-18

## 2019-09-18 ENCOUNTER — INPATIENT (INPATIENT)
Facility: HOSPITAL | Age: 74
LOS: 1 days | Discharge: HOME HEALTH | End: 2019-09-20
Attending: SPECIALIST | Admitting: SPECIALIST
Payer: MEDICAID

## 2019-09-18 ENCOUNTER — APPOINTMENT (OUTPATIENT)
Dept: BREAST CENTER | Facility: HOSPITAL | Age: 74
End: 2019-09-18
Payer: MEDICAID

## 2019-09-18 VITALS
DIASTOLIC BLOOD PRESSURE: 81 MMHG | RESPIRATION RATE: 18 BRPM | HEART RATE: 73 BPM | WEIGHT: 214.95 LBS | TEMPERATURE: 99 F | SYSTOLIC BLOOD PRESSURE: 140 MMHG

## 2019-09-18 DIAGNOSIS — Z98.890 OTHER SPECIFIED POSTPROCEDURAL STATES: Chronic | ICD-10-CM

## 2019-09-18 DIAGNOSIS — Z90.89 ACQUIRED ABSENCE OF OTHER ORGANS: Chronic | ICD-10-CM

## 2019-09-18 DIAGNOSIS — Z98.51 TUBAL LIGATION STATUS: Chronic | ICD-10-CM

## 2019-09-18 DIAGNOSIS — Z90.49 ACQUIRED ABSENCE OF OTHER SPECIFIED PARTS OF DIGESTIVE TRACT: Chronic | ICD-10-CM

## 2019-09-18 LAB — BLD GP AB SCN SERPL QL: SIGNIFICANT CHANGE UP

## 2019-09-18 PROCEDURE — 38900 IO MAP OF SENT LYMPH NODE: CPT | Mod: 50

## 2019-09-18 PROCEDURE — 88341 IMHCHEM/IMCYTCHM EA ADD ANTB: CPT | Mod: 26

## 2019-09-18 PROCEDURE — 88342 IMHCHEM/IMCYTCHM 1ST ANTB: CPT | Mod: 26

## 2019-09-18 PROCEDURE — 19303 MAST SIMPLE COMPLETE: CPT | Mod: 50

## 2019-09-18 PROCEDURE — 88307 TISSUE EXAM BY PATHOLOGIST: CPT | Mod: 26

## 2019-09-18 PROCEDURE — 38525 BIOPSY/REMOVAL LYMPH NODES: CPT | Mod: 50

## 2019-09-18 RX ORDER — MORPHINE SULFATE 50 MG/1
4 CAPSULE, EXTENDED RELEASE ORAL
Refills: 0 | Status: DISCONTINUED | OUTPATIENT
Start: 2019-09-18 | End: 2019-09-18

## 2019-09-18 RX ORDER — NALOXONE HYDROCHLORIDE 4 MG/.1ML
0.1 SPRAY NASAL
Refills: 0 | Status: DISCONTINUED | OUTPATIENT
Start: 2019-09-18 | End: 2019-09-20

## 2019-09-18 RX ORDER — ACETAMINOPHEN 500 MG
650 TABLET ORAL EVERY 6 HOURS
Refills: 0 | Status: DISCONTINUED | OUTPATIENT
Start: 2019-09-18 | End: 2019-09-20

## 2019-09-18 RX ORDER — MORPHINE SULFATE 50 MG/1
2 CAPSULE, EXTENDED RELEASE ORAL
Refills: 0 | Status: DISCONTINUED | OUTPATIENT
Start: 2019-09-18 | End: 2019-09-18

## 2019-09-18 RX ORDER — PANTOPRAZOLE SODIUM 20 MG/1
40 TABLET, DELAYED RELEASE ORAL
Refills: 0 | Status: DISCONTINUED | OUTPATIENT
Start: 2019-09-18 | End: 2019-09-20

## 2019-09-18 RX ORDER — ONDANSETRON 8 MG/1
4 TABLET, FILM COATED ORAL ONCE
Refills: 0 | Status: DISCONTINUED | OUTPATIENT
Start: 2019-09-18 | End: 2019-09-18

## 2019-09-18 RX ORDER — BUDESONIDE AND FORMOTEROL FUMARATE DIHYDRATE 160; 4.5 UG/1; UG/1
2 AEROSOL RESPIRATORY (INHALATION)
Refills: 0 | Status: DISCONTINUED | OUTPATIENT
Start: 2019-09-18 | End: 2019-09-20

## 2019-09-18 RX ORDER — LEVOTHYROXINE SODIUM 125 MCG
200 TABLET ORAL DAILY
Refills: 0 | Status: DISCONTINUED | OUTPATIENT
Start: 2019-09-18 | End: 2019-09-20

## 2019-09-18 RX ORDER — LOSARTAN POTASSIUM 100 MG/1
100 TABLET, FILM COATED ORAL DAILY
Refills: 0 | Status: DISCONTINUED | OUTPATIENT
Start: 2019-09-18 | End: 2019-09-20

## 2019-09-18 RX ORDER — HEPARIN SODIUM 5000 [USP'U]/ML
5000 INJECTION INTRAVENOUS; SUBCUTANEOUS EVERY 8 HOURS
Refills: 0 | Status: DISCONTINUED | OUTPATIENT
Start: 2019-09-18 | End: 2019-09-20

## 2019-09-18 RX ORDER — IBUPROFEN 200 MG
600 TABLET ORAL EVERY 6 HOURS
Refills: 0 | Status: DISCONTINUED | OUTPATIENT
Start: 2019-09-18 | End: 2019-09-20

## 2019-09-18 RX ORDER — ALBUTEROL 90 UG/1
2 AEROSOL, METERED ORAL EVERY 6 HOURS
Refills: 0 | Status: DISCONTINUED | OUTPATIENT
Start: 2019-09-18 | End: 2019-09-20

## 2019-09-18 RX ORDER — SODIUM CHLORIDE 9 MG/ML
1000 INJECTION, SOLUTION INTRAVENOUS
Refills: 0 | Status: DISCONTINUED | OUTPATIENT
Start: 2019-09-18 | End: 2019-09-19

## 2019-09-18 RX ORDER — HYDROMORPHONE HYDROCHLORIDE 2 MG/ML
30 INJECTION INTRAMUSCULAR; INTRAVENOUS; SUBCUTANEOUS
Refills: 0 | Status: DISCONTINUED | OUTPATIENT
Start: 2019-09-18 | End: 2019-09-19

## 2019-09-18 RX ORDER — ONDANSETRON 8 MG/1
4 TABLET, FILM COATED ORAL EVERY 6 HOURS
Refills: 0 | Status: DISCONTINUED | OUTPATIENT
Start: 2019-09-18 | End: 2019-09-20

## 2019-09-18 RX ORDER — SODIUM CHLORIDE 9 MG/ML
1000 INJECTION, SOLUTION INTRAVENOUS
Refills: 0 | Status: DISCONTINUED | OUTPATIENT
Start: 2019-09-18 | End: 2019-09-18

## 2019-09-18 RX ORDER — CLONAZEPAM 1 MG
0.5 TABLET ORAL THREE TIMES A DAY
Refills: 0 | Status: DISCONTINUED | OUTPATIENT
Start: 2019-09-18 | End: 2019-09-20

## 2019-09-18 RX ADMIN — MORPHINE SULFATE 4 MILLIGRAM(S): 50 CAPSULE, EXTENDED RELEASE ORAL at 12:35

## 2019-09-18 RX ADMIN — Medication 600 MILLIGRAM(S): at 21:09

## 2019-09-18 RX ADMIN — SODIUM CHLORIDE 100 MILLILITER(S): 9 INJECTION, SOLUTION INTRAVENOUS at 12:39

## 2019-09-18 RX ADMIN — Medication 100 MILLIGRAM(S): at 16:44

## 2019-09-18 RX ADMIN — Medication 600 MILLIGRAM(S): at 21:16

## 2019-09-18 RX ADMIN — HEPARIN SODIUM 5000 UNIT(S): 5000 INJECTION INTRAVENOUS; SUBCUTANEOUS at 21:10

## 2019-09-18 RX ADMIN — LOSARTAN POTASSIUM 100 MILLIGRAM(S): 100 TABLET, FILM COATED ORAL at 21:11

## 2019-09-18 RX ADMIN — MORPHINE SULFATE 4 MILLIGRAM(S): 50 CAPSULE, EXTENDED RELEASE ORAL at 13:30

## 2019-09-18 RX ADMIN — Medication 650 MILLIGRAM(S): at 21:10

## 2019-09-18 RX ADMIN — Medication 100 MILLIGRAM(S): at 21:13

## 2019-09-18 RX ADMIN — HEPARIN SODIUM 5000 UNIT(S): 5000 INJECTION INTRAVENOUS; SUBCUTANEOUS at 14:18

## 2019-09-18 RX ADMIN — Medication 0.5 MILLIGRAM(S): at 21:12

## 2019-09-18 RX ADMIN — Medication 650 MILLIGRAM(S): at 21:16

## 2019-09-18 RX ADMIN — HYDROMORPHONE HYDROCHLORIDE 30 MILLILITER(S): 2 INJECTION INTRAMUSCULAR; INTRAVENOUS; SUBCUTANEOUS at 14:27

## 2019-09-18 RX ADMIN — MORPHINE SULFATE 2 MILLIGRAM(S): 50 CAPSULE, EXTENDED RELEASE ORAL at 13:25

## 2019-09-18 RX ADMIN — MORPHINE SULFATE 4 MILLIGRAM(S): 50 CAPSULE, EXTENDED RELEASE ORAL at 13:25

## 2019-09-18 RX ADMIN — MORPHINE SULFATE 4 MILLIGRAM(S): 50 CAPSULE, EXTENDED RELEASE ORAL at 12:50

## 2019-09-18 RX ADMIN — MORPHINE SULFATE 2 MILLIGRAM(S): 50 CAPSULE, EXTENDED RELEASE ORAL at 13:13

## 2019-09-18 NOTE — CHART NOTE - NSCHARTNOTEFT_GEN_A_CORE
Post Operative Check    Patient is post op from a bilateral simple mastectomy with bilateral scintigraphic localization and surgical removal of sentinel lymph node.     Vitals    T(C): 36.3 (09-18-19 @ 16:00), Max: 37.2 (09-18-19 @ 06:15)  HR: 79 (09-18-19 @ 16:00) (73 - 98)  BP: 124/68 (09-18-19 @ 16:00) (113/58 - 140/81)  RR: 12 (09-18-19 @ 16:00) (12 - 18)  SpO2: 99% (09-18-19 @ 16:00) (96% - 99%)  Wt(kg): --      09-18 @ 07:01  -  09-18 @ 16:11  --------------------------------------------------------  IN:    lactated ringers.: 350 mL  Total IN: 350 mL    OUT:    Bulb: 30 mL    Bulb: 25 mL    Indwelling Catheter - Urethral: 295 mL  Total OUT: 350 mL    Total NET: 0 mL          Physical Exam  General: NAD AAOx3   Cards: RRR S1S2  Resp: CTAB  Abdomen:  :  Ext: NTBL    Labs  CAPILLARY BLOOD GLUCOSE          Radiology: No post-op studies.       Patient is a 74y old Female s/p bilateral simple mastectomy with bilateral scintigraphic localization and surgical removal of sentinel lymph node.     Plan:  - Pain control with Post Operative Check    Patient is post op from a bilateral simple mastectomy with bilateral scintigraphic localization and surgical removal of sentinel lymph node. Patient's pain is well controlled with PCA. No nausea or vomiting. Patient is making good urine (295mL since 1pm). No gas or BM. Pulling 1500 on IS.     Vitals    T(C): 36.3 (09-18-19 @ 16:00), Max: 37.2 (09-18-19 @ 06:15)  HR: 79 (09-18-19 @ 16:00) (73 - 98)  BP: 124/68 (09-18-19 @ 16:00) (113/58 - 140/81)  RR: 12 (09-18-19 @ 16:00) (12 - 18)  SpO2: 99% (09-18-19 @ 16:00) (96% - 99%)  Wt(kg): --      09-18 @ 07:01  -  09-18 @ 16:11  --------------------------------------------------------  IN:    lactated ringers.: 350 mL  Total IN: 350 mL    OUT:    Bulb: 30 mL    Bulb: 25 mL    Indwelling Catheter - Urethral: 295 mL  Total OUT: 350 mL    Total NET: 0 mL      Physical Exam  General: NAD AAOx3   Cards: RRR S1S2  Resp: CTAB  Abdomen: Soft and non-tender  Breast: GRACY drains in situ with serosanguinous output. Dressings dry and intact. No hematoma.   : bonilla in situ   Ext: NTBL    Labs  CAPILLARY BLOOD GLUCOSE          Radiology: No post-op studies.       Patient is a 74y old Female s/p bilateral simple mastectomy with bilateral scintigraphic localization and surgical removal of sentinel lymph node.     Plan:  - Pain control  - CLD   - remove bonilla at midnight   - IS   - DVT prophylaxis   - encourage ambulation

## 2019-09-18 NOTE — ASU PATIENT PROFILE, ADULT - PMH
Anxiety    Arthritis  OA  Asthma  no recent attacks  Bilateral breast cancer    Blood clot in vein  5 y ago with treatment  Cancer of breast    Chronic obstructive pulmonary disease, unspecified COPD type  h7p  abg/  Depression  no suicide ideation  HTN (hypertension)    Hypothyroid    Osteoarthritis

## 2019-09-18 NOTE — BRIEF OPERATIVE NOTE - NSICDXBRIEFPOSTOP_GEN_ALL_CORE_FT
POST-OP DIAGNOSIS:  Carcinoma of breast upper outer quadrant, right 18-Sep-2019 12:20:07  Geno Candelario

## 2019-09-18 NOTE — BRIEF OPERATIVE NOTE - NSICDXBRIEFPREOP_GEN_ALL_CORE_FT
PRE-OP DIAGNOSIS:  Primary cancer of upper outer quadrant of right breast 18-Sep-2019 12:19:56  Geno Candelario

## 2019-09-19 ENCOUNTER — TRANSCRIPTION ENCOUNTER (OUTPATIENT)
Age: 74
End: 2019-09-19

## 2019-09-19 RX ORDER — CALCIUM CARBONATE 500(1250)
1 TABLET ORAL THREE TIMES A DAY
Refills: 0 | Status: DISCONTINUED | OUTPATIENT
Start: 2019-09-19 | End: 2019-09-20

## 2019-09-19 RX ORDER — ACETAMINOPHEN 500 MG
2 TABLET ORAL
Qty: 0 | Refills: 0 | DISCHARGE
Start: 2019-09-19

## 2019-09-19 RX ORDER — IBUPROFEN 200 MG
1 TABLET ORAL
Qty: 0 | Refills: 0 | DISCHARGE
Start: 2019-09-19

## 2019-09-19 RX ADMIN — HEPARIN SODIUM 5000 UNIT(S): 5000 INJECTION INTRAVENOUS; SUBCUTANEOUS at 14:25

## 2019-09-19 RX ADMIN — PANTOPRAZOLE SODIUM 40 MILLIGRAM(S): 20 TABLET, DELAYED RELEASE ORAL at 05:22

## 2019-09-19 RX ADMIN — Medication 600 MILLIGRAM(S): at 05:22

## 2019-09-19 RX ADMIN — HEPARIN SODIUM 5000 UNIT(S): 5000 INJECTION INTRAVENOUS; SUBCUTANEOUS at 21:54

## 2019-09-19 RX ADMIN — Medication 600 MILLIGRAM(S): at 02:08

## 2019-09-19 RX ADMIN — LOSARTAN POTASSIUM 100 MILLIGRAM(S): 100 TABLET, FILM COATED ORAL at 05:22

## 2019-09-19 RX ADMIN — Medication 200 MICROGRAM(S): at 05:22

## 2019-09-19 RX ADMIN — Medication 650 MILLIGRAM(S): at 23:42

## 2019-09-19 RX ADMIN — Medication 650 MILLIGRAM(S): at 05:22

## 2019-09-19 RX ADMIN — Medication 600 MILLIGRAM(S): at 23:40

## 2019-09-19 RX ADMIN — BUDESONIDE AND FORMOTEROL FUMARATE DIHYDRATE 2 PUFF(S): 160; 4.5 AEROSOL RESPIRATORY (INHALATION) at 20:11

## 2019-09-19 RX ADMIN — Medication 600 MILLIGRAM(S): at 17:13

## 2019-09-19 RX ADMIN — Medication 0.5 MILLIGRAM(S): at 05:20

## 2019-09-19 RX ADMIN — Medication 600 MILLIGRAM(S): at 05:21

## 2019-09-19 RX ADMIN — Medication 100 MILLIGRAM(S): at 05:21

## 2019-09-19 RX ADMIN — Medication 600 MILLIGRAM(S): at 11:23

## 2019-09-19 RX ADMIN — HEPARIN SODIUM 5000 UNIT(S): 5000 INJECTION INTRAVENOUS; SUBCUTANEOUS at 05:22

## 2019-09-19 RX ADMIN — Medication 650 MILLIGRAM(S): at 17:13

## 2019-09-19 RX ADMIN — Medication 650 MILLIGRAM(S): at 11:23

## 2019-09-19 RX ADMIN — Medication 600 MILLIGRAM(S): at 02:09

## 2019-09-19 RX ADMIN — Medication 650 MILLIGRAM(S): at 02:09

## 2019-09-19 RX ADMIN — Medication 0.5 MILLIGRAM(S): at 14:24

## 2019-09-19 RX ADMIN — Medication 650 MILLIGRAM(S): at 05:21

## 2019-09-19 RX ADMIN — Medication 650 MILLIGRAM(S): at 23:41

## 2019-09-19 RX ADMIN — Medication 600 MILLIGRAM(S): at 23:42

## 2019-09-19 RX ADMIN — Medication 0.5 MILLIGRAM(S): at 21:56

## 2019-09-19 RX ADMIN — BUDESONIDE AND FORMOTEROL FUMARATE DIHYDRATE 2 PUFF(S): 160; 4.5 AEROSOL RESPIRATORY (INHALATION) at 09:24

## 2019-09-19 RX ADMIN — Medication 100 MILLIGRAM(S): at 21:53

## 2019-09-19 RX ADMIN — Medication 100 MILLIGRAM(S): at 14:24

## 2019-09-19 NOTE — DISCHARGE NOTE PROVIDER - CARE PROVIDER_API CALL
Geno Candelario)  Sur[facility]  [facility] [address] [place] [address]  Phone: [phone]  Fax: [phone]  Follow Up Time: 2 weeks

## 2019-09-19 NOTE — DISCHARGE NOTE NURSING/CASE MANAGEMENT/SOCIAL WORK - PATIENT PORTAL LINK FT
You can access the FollowMyHealth Patient Portal offered by [facility] by registering at the following website: [email or web address]. By joining [employer]’s FollowMyHealth portal, you will also be able to view your health information using other applications (apps) compatible with our system.

## 2019-09-19 NOTE — PROGRESS NOTE ADULT - SUBJECTIVE AND OBJECTIVE BOX
GENERAL SURGERY PROGRESS NOTE     ALDO DYKES  74y  Female  Hospital day :1d  POD:  Procedure: Bilateral simple mastectomy with bilateral scintigraphic localization and surgical removal of sentinel lymph node    OVERNIGHT EVENTS: Patient doing well postoperatively, has not passed TOV as of 3:21 AM, bonilla discontinued at midnight     T(F): 96.8 (09-19-19 @ 00:30), Max: 98.9 (09-18-19 @ 06:15)  HR: 63 (09-19-19 @ 00:30) (62 - 98)  BP: 126/70 (09-19-19 @ 00:30) (113/58 - 140/81)  RR: 18 (09-19-19 @ 00:30) (12 - 18)  SpO2: 99% (09-18-19 @ 18:42) (96% - 100%)    DIET/FLUIDS: lactated ringers. 1000 milliLiter(s) IV Continuous <Continuous>     GI proph:  pantoprazole    Tablet 40 milliGRAM(s) Oral before breakfast    AC/ proph: heparin  Injectable 5000 Unit(s) SubCutaneous every 8 hours    ABx: clindamycin IVPB 900 milliGRAM(s) IV Intermittent every 8 hours  clindamycin IVPB        PHYSICAL EXAM:  GENERAL: NAD, well-appearing  CHEST/LUNG: Clear to auscultation bilaterally  HEART: Regular rate and rhythm  ABDOMEN: Soft, Nontender, Nondistended;   EXTREMITIES:  No clubbing, cyanosis, or edema

## 2019-09-19 NOTE — DISCHARGE NOTE PROVIDER - NSDCFUSCHEDAPPT_GEN_ALL_CORE_FT
ALDO DYKES ; 09/20/2019 ; [facility] PreAdmits  ALDO DYKES ; 10/01/2019 ; [facility] [address] ALDO DYKES ; 09/20/2019 ; [facility] PreAdmits  ALDO DYKES ; 10/01/2019 ; [facility] [address] ALDO DYKES ; 10/01/2019 ; [facility] [address]

## 2019-09-19 NOTE — DISCHARGE NOTE PROVIDER - HOSPITAL COURSE
74F with R breast invasive ductal carcinoma s/p bilateral simple mastectomy with sentinel lymph node biopsy. She had 2 zena drains placed, one on either side. Post-operatively the patient's bonilla was removed and she was able to void. At the time of discharge, she was medically stable. She was able to tolerate a regular diet, ambulate, had pain controlled, and void.

## 2019-09-19 NOTE — DISCHARGE NOTE PROVIDER - NSDCCPCAREPLAN_GEN_ALL_CORE_FT
PRINCIPAL DISCHARGE DIAGNOSIS  Diagnosis: S/P bilateral mastectomy  Assessment and Plan of Treatment: and sentinel lymph node biopsy. PRINCIPAL DISCHARGE DIAGNOSIS  Diagnosis: S/P bilateral mastectomy  Assessment and Plan of Treatment: and sentinel lymph node biopsy.  Pain: Take Tylenol or Motrin as needed for pain.   Activity: Keep your surgical bra on at all times. No heavy lifting anything over 10 pounds or extreme activity for 6 weeks.   Diet: Continue your regular diet.   Wound care: Empty your drains at least once a day and record output as instructed. Change the dressings as needed.   Follow up: With Dr. Candelario in clinic in 1-2 weeks.   Come back to the ED and call Dr. Candelario if:   -You have pain not controlled by medications  -You have a fever greater than 100.4 F  -You have a concern for infection of the surgical site  -You have an increase in drainage of blood or pus from the surgical site PRINCIPAL DISCHARGE DIAGNOSIS  Diagnosis: S/P bilateral mastectomy  Assessment and Plan of Treatment: and sentinel lymph node biopsy.  Pain: Take Tylenol (650mg) or Motrin (600mg) every 6-8 hours as needed for pain. Take Oxycodone (5mg) every 8 hours as needed for severe pain. This medication can make you nauseous/drowsy, so reserve for nighttime use.  Activity: Keep your surgical bra on at all times. No heavy lifting anything over 10 pounds or extreme activity for 6 weeks.   Diet: Continue your regular diet.   Wound care: Empty your drains at least once a day and record output as instructed. Change the dressings as needed.   Follow up: With Dr. Candelario in clinic in 1-2 weeks.   Come back to the ED and call Dr. Candelario if:   -You have pain not controlled by medications  -You have a fever greater than 100.4 F  -You have a concern for infection of the surgical site  -You have an increase in drainage of blood or pus from the surgical site

## 2019-09-19 NOTE — CHART NOTE - NSCHARTNOTEFT_GEN_A_CORE
Spoke with patient via  phone (ID: [id number]). We discussed her current condition, treatment plans, discharge, and answered any questions she had.

## 2019-09-20 VITALS
TEMPERATURE: 97 F | DIASTOLIC BLOOD PRESSURE: 67 MMHG | RESPIRATION RATE: 18 BRPM | HEART RATE: 66 BPM | SYSTOLIC BLOOD PRESSURE: 141 MMHG

## 2019-09-20 LAB
ANION GAP SERPL CALC-SCNC: 10 MMOL/L — SIGNIFICANT CHANGE UP (ref 7–14)
BUN SERPL-MCNC: 17 MG/DL — SIGNIFICANT CHANGE UP (ref 10–20)
CALCIUM SERPL-MCNC: 8.6 MG/DL — SIGNIFICANT CHANGE UP (ref 8.5–10.1)
CHLORIDE SERPL-SCNC: 100 MMOL/L — SIGNIFICANT CHANGE UP (ref 98–110)
CK MB CFR SERPL CALC: 4.4 NG/ML — SIGNIFICANT CHANGE UP (ref 0.6–6.3)
CK SERPL-CCNC: 164 U/L — SIGNIFICANT CHANGE UP (ref 0–225)
CO2 SERPL-SCNC: 23 MMOL/L — SIGNIFICANT CHANGE UP (ref 17–32)
CREAT SERPL-MCNC: 0.9 MG/DL — SIGNIFICANT CHANGE UP (ref 0.7–1.5)
GLUCOSE SERPL-MCNC: 98 MG/DL — SIGNIFICANT CHANGE UP (ref 70–99)
HCT VFR BLD CALC: 36.2 % — LOW (ref 37–47)
HGB BLD-MCNC: 12.4 G/DL — SIGNIFICANT CHANGE UP (ref 12–16)
MAGNESIUM SERPL-MCNC: 1.6 MG/DL — LOW (ref 1.8–2.4)
MCHC RBC-ENTMCNC: 30.8 PG — SIGNIFICANT CHANGE UP (ref 27–31)
MCHC RBC-ENTMCNC: 34.3 G/DL — SIGNIFICANT CHANGE UP (ref 32–37)
MCV RBC AUTO: 89.8 FL — SIGNIFICANT CHANGE UP (ref 81–99)
NRBC # BLD: 0 /100 WBCS — SIGNIFICANT CHANGE UP (ref 0–0)
PHOSPHATE SERPL-MCNC: 2.7 MG/DL — SIGNIFICANT CHANGE UP (ref 2.1–4.9)
PLATELET # BLD AUTO: 219 K/UL — SIGNIFICANT CHANGE UP (ref 130–400)
POTASSIUM SERPL-MCNC: 4.1 MMOL/L — SIGNIFICANT CHANGE UP (ref 3.5–5)
POTASSIUM SERPL-SCNC: 4.1 MMOL/L — SIGNIFICANT CHANGE UP (ref 3.5–5)
RBC # BLD: 4.03 M/UL — LOW (ref 4.2–5.4)
RBC # FLD: 13.3 % — SIGNIFICANT CHANGE UP (ref 11.5–14.5)
SODIUM SERPL-SCNC: 133 MMOL/L — LOW (ref 135–146)
TROPONIN T SERPL-MCNC: <0.01 NG/ML — SIGNIFICANT CHANGE UP
WBC # BLD: 7.52 K/UL — SIGNIFICANT CHANGE UP (ref 4.8–10.8)
WBC # FLD AUTO: 7.52 K/UL — SIGNIFICANT CHANGE UP (ref 4.8–10.8)

## 2019-09-20 PROCEDURE — 93010 ELECTROCARDIOGRAM REPORT: CPT

## 2019-09-20 RX ORDER — MAGNESIUM SULFATE 500 MG/ML
2 VIAL (ML) INJECTION ONCE
Refills: 0 | Status: COMPLETED | OUTPATIENT
Start: 2019-09-20 | End: 2019-09-20

## 2019-09-20 RX ORDER — DIPHENHYDRAMINE HCL 50 MG
25 CAPSULE ORAL ONCE
Refills: 0 | Status: COMPLETED | OUTPATIENT
Start: 2019-09-20 | End: 2019-09-20

## 2019-09-20 RX ORDER — OXYCODONE HYDROCHLORIDE 5 MG/1
1 TABLET ORAL
Qty: 5 | Refills: 0
Start: 2019-09-20

## 2019-09-20 RX ORDER — OXYCODONE HYDROCHLORIDE 5 MG/1
5 TABLET ORAL EVERY 6 HOURS
Refills: 0 | Status: DISCONTINUED | OUTPATIENT
Start: 2019-09-20 | End: 2019-09-20

## 2019-09-20 RX ADMIN — Medication 200 MICROGRAM(S): at 05:04

## 2019-09-20 RX ADMIN — Medication 600 MILLIGRAM(S): at 05:10

## 2019-09-20 RX ADMIN — PANTOPRAZOLE SODIUM 40 MILLIGRAM(S): 20 TABLET, DELAYED RELEASE ORAL at 08:15

## 2019-09-20 RX ADMIN — LOSARTAN POTASSIUM 100 MILLIGRAM(S): 100 TABLET, FILM COATED ORAL at 05:04

## 2019-09-20 RX ADMIN — HEPARIN SODIUM 5000 UNIT(S): 5000 INJECTION INTRAVENOUS; SUBCUTANEOUS at 05:03

## 2019-09-20 RX ADMIN — Medication 0.5 MILLIGRAM(S): at 05:03

## 2019-09-20 RX ADMIN — Medication 600 MILLIGRAM(S): at 05:04

## 2019-09-20 RX ADMIN — Medication 650 MILLIGRAM(S): at 05:04

## 2019-09-20 RX ADMIN — Medication 100 MILLIGRAM(S): at 05:06

## 2019-09-20 RX ADMIN — Medication 650 MILLIGRAM(S): at 05:10

## 2019-09-20 RX ADMIN — Medication 650 MILLIGRAM(S): at 11:09

## 2019-09-20 RX ADMIN — Medication 25 MILLIGRAM(S): at 01:04

## 2019-09-20 RX ADMIN — Medication 600 MILLIGRAM(S): at 11:09

## 2019-09-20 RX ADMIN — Medication 50 GRAM(S): at 08:40

## 2019-09-20 NOTE — PROGRESS NOTE ADULT - ASSESSMENT
Patient is a 74y old Female s/p bilateral simple mastectomy with bilateral scintigraphic localization and surgical removal of sentinel lymph node.     Plan:  - Pain control  - CLD   - Dia removed at midnight, F/U TOV   - IS   - DVT prophylaxis   - encourage ambulation.
Patient is a 74y old Female s/p bilateral simple mastectomy with bilateral scintigraphic localization and surgical removal of sentinel lymph node.     Plan:  - Regular diet  - Pain control  - IS   - DVT prophylaxis   - encourage ambulation  - ensure VNS is setup (spoke to STEVEN 9/20)

## 2019-09-20 NOTE — PROGRESS NOTE ADULT - SUBJECTIVE AND OBJECTIVE BOX
GENERAL SURGERY PROGRESS NOTE     ALDO DYKES  74y  Female  Hospital day :2d  POD:  Procedure: Bilateral simple mastectomy with bilateral scintigraphic localization and surgical removal of sentinel lymph node    OVERNIGHT EVENTS: Spoke with patient via  phone, pain is well controlled voiding normally, tolerating regular diet. Some unsteadiness while walking but is improving.     T(F): 97 (09-20-19 @ 00:51), Max: 98.5 (09-19-19 @ 15:30)  HR: 70 (09-20-19 @ 00:51) (52 - 70)  BP: 152/67 (09-20-19 @ 00:51) (108/57 - 152/67)  ABP: --  ABP(mean): --  RR: 18 (09-20-19 @ 00:51) (18 - 18)  SpO2: 98% (09-19-19 @ 04:00) (98% - 98%)    DIET/FLUIDS:   NG:                                                                                DRAINS:   09-18-19 @ 07:01  -  09-19-19 @ 07:00  --------------------------------------------------------  OUT: 225 mL      BM:     EMESIS:     URINE:   09-18-19 @ 07:01  -  09-19-19 @ 07:00  --------------------------------------------------------  OUT: 1220 mL       GI proph:  pantoprazole    Tablet 40 milliGRAM(s) Oral before breakfast    AC/ proph: heparin  Injectable 5000 Unit(s) SubCutaneous every 8 hours    ABx: clindamycin IVPB 900 milliGRAM(s) IV Intermittent every 8 hours  clindamycin IVPB          PHYSICAL EXAM:  GENERAL: NAD, well-appearing  CHEST/LUNG: Clear to auscultation bilaterally  HEART: Regular rate and rhythm  ABDOMEN/CHEST: Dressings dry and intact, GRACY output = serosanguinous Soft, Nontender, Nondistended;   EXTREMITIES:  No clubbing, cyanosis, or edema      LABS  Labs:  CAPILLARY BLOOD GLUCOSE                      RADIOLOGY & ADDITIONAL TESTS:  No new studies GENERAL SURGERY PROGRESS NOTE     ALDO DYKES  74y  Female  Hospital day :2d  POD:  Procedure: Bilateral simple mastectomy with bilateral scintigraphic localization and surgical removal of sentinel lymph node    OVERNIGHT EVENTS: Called to assess patient for chest pain at 4am. Spoke with patient via  phone (ID [id number]), pain was described as a pressure and was localized to LUQ and radiated to back. No nausea/vomting, mild sob, no diaphoreses. Vitally stable with BP of 142/78, Hr 62, sating 100@ on 2L NC. Cardiorespiratory exam was normal. STAT ECG had no ST elevation. STAT labs in going to lab now.      Patient is tolerating regular diet. Some unsteadiness while walking but is improving.     T(F): 97 (09-20-19 @ 00:51), Max: 98.5 (09-19-19 @ 15:30)  HR: 70 (09-20-19 @ 00:51) (52 - 70)  BP: 152/67 (09-20-19 @ 00:51) (108/57 - 152/67)  ABP: --  ABP(mean): --  RR: 18 (09-20-19 @ 00:51) (18 - 18)  SpO2: 98% (09-19-19 @ 04:00) (98% - 98%)    DIET/FLUIDS:   NG:                                                                                DRAINS:   09-18-19 @ 07:01  -  09-19-19 @ 07:00  --------------------------------------------------------  OUT: 225 mL      BM:     EMESIS:     URINE:   09-18-19 @ 07:01  -  09-19-19 @ 07:00  --------------------------------------------------------  OUT: 1220 mL       GI proph:  pantoprazole    Tablet 40 milliGRAM(s) Oral before breakfast    AC/ proph: heparin  Injectable 5000 Unit(s) SubCutaneous every 8 hours    ABx: clindamycin IVPB 900 milliGRAM(s) IV Intermittent every 8 hours  clindamycin IVPB          PHYSICAL EXAM:  GENERAL: NAD, well-appearing  CHEST/LUNG: Clear to auscultation bilaterally  HEART: Regular rate and rhythm  ABDOMEN/CHEST: Dressings dry and intact, GRACY output = serosanguinous Soft, Nontender, Nondistended;   EXTREMITIES:  No clubbing, cyanosis, or edema      LABS  Labs:  CAPILLARY BLOOD GLUCOSE                      RADIOLOGY & ADDITIONAL TESTS:  No new studies

## 2019-09-26 LAB — SURGICAL PATHOLOGY STUDY: SIGNIFICANT CHANGE UP

## 2019-09-27 DIAGNOSIS — E78.5 HYPERLIPIDEMIA, UNSPECIFIED: ICD-10-CM

## 2019-09-27 DIAGNOSIS — Z90.49 ACQUIRED ABSENCE OF OTHER SPECIFIED PARTS OF DIGESTIVE TRACT: ICD-10-CM

## 2019-09-27 DIAGNOSIS — I10 ESSENTIAL (PRIMARY) HYPERTENSION: ICD-10-CM

## 2019-09-27 DIAGNOSIS — J44.9 CHRONIC OBSTRUCTIVE PULMONARY DISEASE, UNSPECIFIED: ICD-10-CM

## 2019-09-27 DIAGNOSIS — Z88.0 ALLERGY STATUS TO PENICILLIN: ICD-10-CM

## 2019-09-27 DIAGNOSIS — M19.90 UNSPECIFIED OSTEOARTHRITIS, UNSPECIFIED SITE: ICD-10-CM

## 2019-09-27 DIAGNOSIS — C50.411 MALIGNANT NEOPLASM OF UPPER-OUTER QUADRANT OF RIGHT FEMALE BREAST: ICD-10-CM

## 2019-09-27 DIAGNOSIS — E03.9 HYPOTHYROIDISM, UNSPECIFIED: ICD-10-CM

## 2019-09-27 DIAGNOSIS — F32.9 MAJOR DEPRESSIVE DISORDER, SINGLE EPISODE, UNSPECIFIED: ICD-10-CM

## 2019-10-01 ENCOUNTER — APPOINTMENT (OUTPATIENT)
Dept: BREAST CENTER | Facility: CLINIC | Age: 74
End: 2019-10-01
Payer: MEDICAID

## 2019-10-01 VITALS
TEMPERATURE: 99.4 F | WEIGHT: 224 LBS | DIASTOLIC BLOOD PRESSURE: 84 MMHG | SYSTOLIC BLOOD PRESSURE: 132 MMHG | HEIGHT: 65 IN | BODY MASS INDEX: 37.32 KG/M2

## 2019-10-01 PROCEDURE — 99024 POSTOP FOLLOW-UP VISIT: CPT

## 2019-10-01 RX ORDER — OXYCODONE AND ACETAMINOPHEN 5; 325 MG/1; MG/1
5-325 TABLET ORAL
Qty: 30 | Refills: 0 | Status: COMPLETED | COMMUNITY
Start: 2019-10-01 | End: 2019-10-06

## 2019-10-01 RX ORDER — SULFAMETHOXAZOLE AND TRIMETHOPRIM 800; 160 MG/1; MG/1
800-160 TABLET ORAL TWICE DAILY
Qty: 20 | Refills: 0 | Status: DISCONTINUED | COMMUNITY
Start: 2019-10-01 | End: 2019-10-01

## 2019-10-03 ENCOUNTER — APPOINTMENT (OUTPATIENT)
Dept: BREAST CENTER | Facility: CLINIC | Age: 74
End: 2019-10-03
Payer: MEDICAID

## 2019-10-03 VITALS — HEIGHT: 65 IN | BODY MASS INDEX: 37.32 KG/M2 | TEMPERATURE: 98.8 F | WEIGHT: 224 LBS

## 2019-10-03 PROCEDURE — 99024 POSTOP FOLLOW-UP VISIT: CPT

## 2019-10-03 NOTE — ASSESSMENT
[FreeTextEntry1] : 74 year old female with estrogen receptor positive, HER2 negative right breast invasive well to moderately differentiated ductal carcinoma status post bilateral mastectomy.  She presented on Tuesday for her first post operative examination and was found to have bilateral cellulitis; she did not start antibiotics until POD 4 and had not been taking clindamycin appropriately.  On Tuesday, her antibiotics were switched to Bactrim which she is solely on.  She reports improvement in her pain, going from pain scale 10/10 on Tuesday, to 3/10 today.  Chest wall erythema is no longer present on the left side and is decreasing on the right.  Her temperature is normal today and she denies fever and chills.  She still has swelling and erythema on the right side, however, she feels like she is getting better.  She does not have an accurate recording of her zena drain output, she left it at home, however, the output is mildly purulent and serosanguineous, and is still more than 25 cc per 24 hours.  Both zena drains will remain in place.  She will follow up on Tuesday for another post operative check. All of her and her family member's questions were appropriately answered.

## 2019-10-03 NOTE — HISTORY OF PRESENT ILLNESS
[FreeTextEntry1] : PCP: Glenroy Hutchins MD\par \par Patient with right breast invasive well to moderately differentiated ductal carcinoma on ultrasound guided core biopsy 6/12/19; 2:00 N8, 19 mm (cork).  \par Estrogen receptor positive, \par Progesterone receptor positive, \par HER2 negative, 1+\par Ki-67: 10-15%\par \par Right breast invasive well to moderately differentiated ductal carcinoma on ultrasound guided core biopsy 6/12/19; 2:00 N10, 10 mm (tophat).   \par Estrogen receptor positive, \par Progesterone receptor positive, \par HER2 negative, 1+\par Ki-67: 10-15%\par \par Left breast fibroadenoma on ultrasound guided core biopsy 6/12/19; 1:00 N5, (hourglass).  Findings are benign concordant and short term follow up left breast ultrasound recommended in six months.  \par \par No prior complaints related to the breasts. \par Her family history is significant for her sister with breast cancer at 68; her maternal aunt with breast cancer at 65; and her niece (sister's daughter) with breast cancer at 54.  \par \par Bilateral Breast MRI 7/26/19: 5 cm area of clumped non mass enhancement in the lateral aspect of the left breast; MR guided core biopsy recommended.  Per patient and her son, she prefers bilateral mastectomy and therefore biopsy was not indicated. \par \par Status post bilateral simple mastectomy and SLNB 9/18/19 demonstrating Left 0/1 (-); Left hyalinized fibroadenoma.  Right 0/1; Right breast two healing prior biopsy sites in the UIQ with invasive moderately differentiated ductal carcinoma, 22 mm associated with calcifications and DCIS, cribiform and micropapillary types, intermediate nuclear grade; with no definitive foci of LVI.  AJCC 8th Edition Pathologic Stage: pT2, p(sn)N0, pMx.\par \par Patient started antibiotics 4 days after discharge from hospital.

## 2019-10-08 ENCOUNTER — APPOINTMENT (OUTPATIENT)
Dept: BREAST CENTER | Facility: CLINIC | Age: 74
End: 2019-10-08
Payer: MEDICAID

## 2019-10-08 VITALS
BODY MASS INDEX: 37.32 KG/M2 | DIASTOLIC BLOOD PRESSURE: 74 MMHG | HEIGHT: 65 IN | SYSTOLIC BLOOD PRESSURE: 128 MMHG | TEMPERATURE: 98.6 F | WEIGHT: 224 LBS

## 2019-10-08 PROCEDURE — 99024 POSTOP FOLLOW-UP VISIT: CPT

## 2019-10-14 NOTE — ASSESSMENT
[FreeTextEntry1] : Esther Is status post a bilateral simple mastectomy and sentinel LN biopsy. She did not receive empiric antibiotics at the time of her hospital discharge. She has developed cellulitis of the right chest wall. She denies fever and chills. She has recently been on clindamycin but has not taken it appropriately. Incisions are intact. Pathology reviewed with the patient.\par \par We will change her antibiotics to Bactrim DS twice a day. She will follow up in 2 days for evaluation of cellulitis. She will also follow up next week.\par \par We will send Oncotype DX.  She is referred to Dr. Toro, medical oncology

## 2019-10-14 NOTE — REVIEW OF SYSTEMS
[Breast Pain] : breast pain [Fever] : no fever [Chills] : no chills [de-identified] : 10/10 pain scale with right chest wall erythema.

## 2019-10-14 NOTE — DATA REVIEWED
[FreeTextEntry1] : Surgical Final Report\par Final Diagnosis\par 1. Breast, left, simple mastectomy:\par - Hyalinized fibroadenoma located in the upper inner quadrant.\par - Benign atrophic fatty breast tissue with proliferative type\par fibrocystic changes and focal mammary duct ectasia.\par - Unremarkable nipple, skin, and deep fascial margin.\par 2. Breast, left axillary sentinel lymph node, biopsy:\par - One benign largely fatty replaced lymph node (0/1) with\par dermatopathic lymphadenitis and benign age related hyaline\par deposition.\par - Negative for carcinoma with evaluation of multiple H&E levels\par and with negative immunohistochemical stains for cytokeratins (CK\par AE1/AE and CK 8/18).\par 3. Breast, right axillary sentinel lymph node, biopsy:\par - One benign largely fatty replaced lymph node (0/1) with\par dermatopathic lymphadenitis.\par - Negative for carcinoma with evaluation of multiple H&E levels\par and with negative immunohistochemical stains for cytokeratins (CK\par AE1/AE and CK 8/18).\par 4. 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), cribriform and\par micropapillary types, intermediate nuclear grade.\par - No definitive foci of lymphovascular invasion are seen.\par - Atrophic fatty breast tissue.\par - Benign skin with a small seborrheic keratosis, unremarkable\par nipple, and deep fascial margin including skeletal muscle.\par Negative for carcinoma.\par - Pending special studies for ER/MA proteins, proliferation\par index (Ki-67), and HER2/ISADORA oncoprotein expression and/or gene\par amplification, with results to be reported as a separate\par addendum.\par - AJCC 8th Edition Pathologic Stage: pT2, p(sn)N0, pMx.\par Verified by: Jatinder Matute M.D.\par (Electronic Signature)\par Reported on: 09/26/19 12:28 EDT, [address] [place] [id number]\par Phone: [phone]   Fax: [phone]\par

## 2019-10-14 NOTE — CONSULT LETTER
[Dear  ___] : Dear  [unfilled], [Please see my note below.] : Please see my note below. [Sincerely,] : Sincerely, [FreeTextEntry2] : Glenroy Hutchins M.D.\par [address] [place] [address], [address]\[place] [address] \par  [FreeTextEntry1] : This letter is in regard to our mutual patient who underwent mastectomy and sentinel lymph node mapping and biopsy.  Enclosed is a copy of her pathology results.\par \par We will continue to take excellent care of your patient.\par \par Please feel free to contact our office with any questions or concerns.  [FreeTextEntry3] : Geno Candelario M.D., F.A.C.S.

## 2019-10-15 ENCOUNTER — APPOINTMENT (OUTPATIENT)
Dept: BREAST CENTER | Facility: CLINIC | Age: 74
End: 2019-10-15
Payer: MEDICAID

## 2019-10-15 VITALS
WEIGHT: 224 LBS | TEMPERATURE: 98.7 F | DIASTOLIC BLOOD PRESSURE: 82 MMHG | HEIGHT: 65 IN | BODY MASS INDEX: 37.32 KG/M2 | SYSTOLIC BLOOD PRESSURE: 132 MMHG

## 2019-10-15 PROCEDURE — 99024 POSTOP FOLLOW-UP VISIT: CPT

## 2019-10-15 NOTE — REVIEW OF SYSTEMS
[Chills] : chills [Feeling Tired] : feeling tired [Breast Pain] : breast pain [FreeTextEntry2] : occasional chills on and off [Fever] : no fever [de-identified] : 5/10 pain scale

## 2019-10-15 NOTE — ASSESSMENT
[FreeTextEntry1] : Esther is s/p bilateral mastectomy.  On examination, there is decreased erythema and swelling.  GRACY output is serous.\par \par Continue GRACY drains and Bactrim.  F/up in one week.\par \par

## 2019-10-22 ENCOUNTER — APPOINTMENT (OUTPATIENT)
Dept: BREAST CENTER | Facility: CLINIC | Age: 74
End: 2019-10-22
Payer: MEDICAID

## 2019-10-22 PROCEDURE — 99024 POSTOP FOLLOW-UP VISIT: CPT

## 2019-10-22 NOTE — ASSESSMENT
[FreeTextEntry1] : 74 year old female with estrogen receptor positive, HER2 negative right breast invasive well to moderately differentiated ductal carcinoma status post bilateral mastectomy.  At her first post operative examination she was found to have bilateral cellulitis; she did not start antibiotics until POD 4 and had not been taking clindamycin appropriately.  On October 1, her antibiotics were switched to Bactrim which she completed.  Her Bactrim was refilled on October 8.  \par \par She reports minimal left sided pain along the site of the zena drain.  Chest wall erythema is no longer present on the left side and is significantly decreased on the right.  Her temperature is normal today and she denies fever and chills.  Her right sided zena drain fell out over the weekend and has been dressed by her visiting nurse.  All of her dressings were changed.  Her left sided zena drain is putting out  cc of serous fluid in 24 hours, however, the suture is no longer present and is falling out as well.  There is an odor present at the drain site.  She never picked up the antibiotics that were prescribed to her at her last office visit.  Case discussed with Dr. Candelario and her left zena drain was removed today.  She was instructed to  that antibiotic prescription (Bactrim).  She will follow up in two weeks.  All of her and her family member's questions were appropriately answered.

## 2019-11-05 ENCOUNTER — APPOINTMENT (OUTPATIENT)
Dept: BREAST CENTER | Facility: CLINIC | Age: 74
End: 2019-11-05
Payer: MEDICAID

## 2019-11-05 PROCEDURE — 99024 POSTOP FOLLOW-UP VISIT: CPT

## 2019-11-05 NOTE — HISTORY OF PRESENT ILLNESS
[FreeTextEntry1] : PCP: Glenroy Hutchins MD\par \par Patient with right breast invasive well to moderately differentiated ductal carcinoma on ultrasound guided core biopsy 6/12/19; 2:00 N8, 19 mm (cork).  \par Estrogen receptor positive, \par Progesterone receptor positive, \par HER2 negative, 1+\par Ki-67: 10-15%\par \par Right breast invasive well to moderately differentiated ductal carcinoma on ultrasound guided core biopsy 6/12/19; 2:00 N10, 10 mm (tophat).   \par Estrogen receptor positive, \par Progesterone receptor positive, \par HER2 negative, 1+\par Ki-67: 10-15%\par \par Left breast fibroadenoma on ultrasound guided core biopsy 6/12/19; 1:00 N5, (hourglass).  Findings are benign concordant and short term follow up left breast ultrasound recommended in six months.  \par \par No prior complaints related to the breasts. \par Her family history is significant for her sister with breast cancer at 68; her maternal aunt with breast cancer at 65; and her niece (sister's daughter) with breast cancer at 54.  \par \par Bilateral Breast MRI 7/26/19: 5 cm area of clumped non mass enhancement in the lateral aspect of the left breast; MR guided core biopsy recommended.  Per patient and her son, she prefers bilateral mastectomy and therefore biopsy was not indicated. \par \par Status post bilateral simple mastectomy and SLNB 9/18/19 demonstrating Left 0/1 (-); Left hyalinized fibroadenoma.  Right 0/1; Right breast two healing prior biopsy sites in the UIQ with invasive moderately differentiated ductal carcinoma, 22 mm associated with calcifications and DCIS, cribiform and micropapillary types, intermediate nuclear grade; with no definitive foci of LVI.  AJCC 8th Edition Pathologic Stage: pT2, p(sn)N0, pMx.\par \par Patient started antibiotics 4 days after discharge from hospital. \par \par Oncotype DX RS 6\par \par Patient completed antibiotics approximately one week ago; presents today complaining of fever, chills, and dull pain, with abdominal bloating, and swelling of the right axillary area, with mild erythema on her chest.

## 2019-11-05 NOTE — REVIEW OF SYSTEMS
[Fever] : fever [Chills] : chills [Feeling Poorly] : feeling poorly [Breast Pain] : breast pain [de-identified] : complaining of dull pain in the chest.

## 2019-11-05 NOTE — ASSESSMENT
[FreeTextEntry1] : Esther is s/p bilateral mastectomy.  There is much less erythema and the swelling is decreasing.\par \par Continue Bactrim.  F/up in 2 weeks.

## 2019-11-18 ENCOUNTER — APPOINTMENT (OUTPATIENT)
Dept: HEMATOLOGY ONCOLOGY | Facility: CLINIC | Age: 74
End: 2019-11-18
Payer: MEDICAID

## 2019-11-18 VITALS
HEART RATE: 57 BPM | HEIGHT: 65 IN | DIASTOLIC BLOOD PRESSURE: 72 MMHG | BODY MASS INDEX: 37.49 KG/M2 | TEMPERATURE: 96.9 F | RESPIRATION RATE: 16 BRPM | SYSTOLIC BLOOD PRESSURE: 138 MMHG | WEIGHT: 225 LBS

## 2019-11-18 PROCEDURE — 99205 OFFICE O/P NEW HI 60 MIN: CPT

## 2019-11-18 RX ORDER — SULFAMETHOXAZOLE AND TRIMETHOPRIM 800; 160 MG/1; MG/1
800-160 TABLET ORAL TWICE DAILY
Qty: 14 | Refills: 0 | Status: DISCONTINUED | COMMUNITY
Start: 2019-11-05 | End: 2019-11-18

## 2019-11-18 RX ORDER — SULFAMETHOXAZOLE AND TRIMETHOPRIM 800; 160 MG/1; MG/1
800-160 TABLET ORAL TWICE DAILY
Qty: 20 | Refills: 0 | Status: DISCONTINUED | COMMUNITY
Start: 2019-10-08 | End: 2019-11-18

## 2019-11-18 RX ORDER — SULFAMETHOXAZOLE AND TRIMETHOPRIM 800; 160 MG/1; MG/1
800-160 TABLET ORAL TWICE DAILY
Qty: 20 | Refills: 0 | Status: DISCONTINUED | COMMUNITY
Start: 2019-10-01 | End: 2019-11-18

## 2019-11-18 RX ORDER — CLINDAMYCIN HYDROCHLORIDE 300 MG/1
300 CAPSULE ORAL EVERY 6 HOURS
Qty: 28 | Refills: 0 | Status: DISCONTINUED | COMMUNITY
Start: 2019-09-26 | End: 2019-11-18

## 2019-11-18 RX ORDER — SULFAMETHOXAZOLE AND TRIMETHOPRIM 800; 160 MG/1; MG/1
800-160 TABLET ORAL TWICE DAILY
Qty: 20 | Refills: 0 | Status: DISCONTINUED | COMMUNITY
Start: 2019-10-15 | End: 2019-11-18

## 2019-11-18 NOTE — OB HISTORY
[Menarche Age: ____] : age at menarche was [unfilled] [Currently In Menopause] : currently in menopause [Menopause Age: ____] : age at menopause was [unfilled] [___] : Full Term: [unfilled]

## 2019-11-19 ENCOUNTER — APPOINTMENT (OUTPATIENT)
Dept: BREAST CENTER | Facility: CLINIC | Age: 74
End: 2019-11-19
Payer: MEDICAID

## 2019-11-19 VITALS
DIASTOLIC BLOOD PRESSURE: 82 MMHG | SYSTOLIC BLOOD PRESSURE: 130 MMHG | TEMPERATURE: 98.5 F | WEIGHT: 225 LBS | BODY MASS INDEX: 37.49 KG/M2 | HEIGHT: 65 IN

## 2019-11-19 PROCEDURE — 99024 POSTOP FOLLOW-UP VISIT: CPT

## 2019-11-19 NOTE — REVIEW OF SYSTEMS
[Breast Pain] : breast pain [Negative] : Constitutional [Fever] : no fever [Chills] : no chills [Breast Lump] : no breast lump [Breast Warmth] : no breast warmth

## 2019-11-19 NOTE — REASON FOR VISIT
[Post Op: _________] : a [unfilled] post op visit [FreeTextEntry1] : Status post bilateral simple mastectomy and SLNB 9/18/19.

## 2019-11-19 NOTE — ASSESSMENT
[FreeTextEntry1] : ALDO is a sade 74 year old patient who presented today in follow up for a history of right breast cancer.\par She is status post bilateral simple mastectomy and SLNB 9/18/19.\par She has been doing relatively well with complaints of only continued "swelling" (R>L). \par Physical exam today showed stability - erythema continues to decrease.\par \par She will return for follow-up and clinical breast exam in three months or sooner if needed.\par Rx to be sent electronically for Motrin 600mg to be used as needed.\par All questions answered. She knows to call with any concerns. \par

## 2019-11-19 NOTE — HISTORY OF PRESENT ILLNESS
[FreeTextEntry1] : PCP: Glenroy Hutchins MD\par \par Patient with right breast invasive well to moderately differentiated ductal carcinoma on ultrasound guided core biopsy 6/12/19; 2:00 N8, 19 mm (cork).  \par Estrogen receptor positive, \par Progesterone receptor positive, \par HER2 negative, 1+\par Ki-67: 10-15%\par \par Right breast invasive well to moderately differentiated ductal carcinoma on ultrasound guided core biopsy 6/12/19; 2:00 N10, 10 mm (tophat).   \par Estrogen receptor positive, \par Progesterone receptor positive, \par HER2 negative, 1+\par Ki-67: 10-15%\par \par Left breast fibroadenoma on ultrasound guided core biopsy 6/12/19; 1:00 N5, (hourglass).  Findings are benign concordant and short term follow up left breast ultrasound recommended in six months.  \par \par No prior complaints related to the breasts. \par Her family history is significant for her sister with breast cancer at 68; her maternal aunt with breast cancer at 65; and her niece (sister's daughter) with breast cancer at 54.  \par \par Bilateral Breast MRI 7/26/19: 5 cm area of clumped non mass enhancement in the lateral aspect of the left breast; MR guided core biopsy recommended.  Per patient and her son, she prefers bilateral mastectomy and therefore biopsy was not indicated. \par \par Status post bilateral simple mastectomy and SLNB 9/18/19 demonstrating Left 0/1 (-); Left hyalinized fibroadenoma.  Right 0/1; Right breast two healing prior biopsy sites in the UIQ with invasive moderately differentiated ductal carcinoma, 22 mm associated with calcifications and DCIS, cribiform and micropapillary types, intermediate nuclear grade; with no definitive foci of LVI.  AJCC 8th Edition Pathologic Stage: pT2, p(sn)N0, pMx.\par \par Patient started antibiotics 4 days after discharge from hospital. \par \par Oncotype DX RS 6\par \par Dr. Hartley - Tamoxifen.\par \par Pt has been feeling well. \par She is still c/o intermittent discomfort (R>L).

## 2019-11-19 NOTE — PHYSICAL EXAM
[Atraumatic] : atraumatic [Normocephalic] : normocephalic [No Ulceration] : no ulceration [de-identified] : s/p B/L mastectomy.\par mild erythema / swelling.\par (R>L).

## 2019-11-24 NOTE — HISTORY OF PRESENT ILLNESS
[de-identified] : 74 yof, PMH of asthma, HTN, hypothyroidism, severe osteoarthritis (receives cortisone injections) presents for initial evaluation of R-sided breast cancer.  Patient palpated a mass in her right breast and underwent ultrasound-guided core biopsy, which showed invasive ductal carcinoma well to moderately differentiated ER/IA positive %, HER2 negative, Ki-67 10-15%.  B/L breast MRI revealed 5 cm non-mass enhancement lateral left breast.  No biopsy was done secondary to patient preference to undergo B/L mastectomy and sentinel lymph node biopsy.  Pathology from B/L mastectomy and SLNB revealed left breast fibroadenoma, right breast invasive ductal carcinoma, moderately differentiated, 22 mm, a/w calcifications and DCIS, no LVI, negative margins.  Pathology pT2p(sn)N0pMx (stage II A).  ER >90%, IA >90%, Ki-67 10-15%.  Oncotype dx score is 6.  Patient now presents with daughter regarding initiation of antihormonal therapy.\par \par Patient experienced menarche at age 11 and menopause at age 49.  She is  and has never .  She took OCPs for about one year in late 20s.  Never took hormone replacement therapy.\par \par ROS is significant for persistent post-operative cellulitis of right chest wall, for which patient has already completed antibiotic therapy.  Patient also has chronic low back pain and B/L knee arthritis pain and has received cortisone injections.  She is very concerned about risk of osteoporosis and osteopenia with aromatase inhibitors.

## 2019-11-24 NOTE — PHYSICAL EXAM
[Obese] : obese [Normal] : affect appropriate [de-identified] : S/p B/L mastectomy.  Redness and warmth of R chest wall. [de-identified] : obese [de-identified] : Ambulates with cane [de-identified] : Redness and warmth of right chest wall

## 2019-11-24 NOTE — REVIEW OF SYSTEMS
[Negative] : Heme/Lymph [FreeTextEntry9] : chronic low back and B/L knee pain [de-identified] : persistent cellulitis R chest wall

## 2019-11-24 NOTE — REASON FOR VISIT
[Initial Consultation] : an initial consultation [Family Member] : family member [Patient Declined  Services] : - None: Patient declined  services [FreeTextEntry2] : breast cancer [TWNoteComboBox1] : Somali [FreeTextEntry3] : Preferred daughter to translate.

## 2019-11-24 NOTE — ASSESSMENT
[FreeTextEntry1] : 74 yof, newly diagnosed with stage IIA, HR positive, HER2 negative breast cancer s/p B/L mastectomy, presents to discuss treatment with antihormonal therapy.\par \par 1. Stage IIA HR positive HER2 negative breast cancer-We had a long discussion with the pt and her daughter regarding the natural history, treatment, and prognosis of early stage breast cancer.  We reviewed the pathology report and staging with them.  The pathology is showing a  moderately differentiated tumor with low Oncotype Dx score..  There are no significant adverse features.  We  recommended treatment with an aromatase inhibitor, Arimidex 1 mg daily for at least 5 years.  The side effects from such treatment were discussed in detail including but not limited to hot flashes, weight gain, hair thinning, arthralgia, myalgia, bone loss, increased risk of osteoporotic fractures and thrombo-embolism.  Patient is very concerned regarding the risk of osteopenia and osteoporosis secondary to her baseline chronic arthritis pains decreasing ambulatory status.  Also discussed treatment with tamoxifen 20 mg daily for at least 5 years.  The side effects from such treatment were discussed in detail including but not limited to hot flashes, weight gain, arthralgia, mylagia, increase thrombo-embolism risk, and small increased risk of endometrial cancer.  Explained that tamoxifen does not carry the same risk of osteoporosis and osteopenia as aromatase inhibitors.  After an extensive discussion, patient agreed to start treatment with tamoxifen.  Will order baseline DEXA scan today.\par \par Patient will follow up with Dr. Candelario tomorrow regarding postoperative cellulitis of R chest wall.  In the event of future reconstructive surgeries, she was instructed to hold her tamoxifen one week prior and to restart it about one week later or at the time of return to baseline ambulatory status.\par \par Follow up in 3 months.  Patient will call for sooner appointment if she is experiencing severe or unexpected adverse effects.\par \par Patient was seen and examined with Dr. Hartley, who agreed with the above plan of care.\par \par \par

## 2020-01-21 ENCOUNTER — APPOINTMENT (OUTPATIENT)
Dept: BREAST CENTER | Facility: CLINIC | Age: 75
End: 2020-01-21
Payer: MEDICARE

## 2020-01-21 VITALS
HEIGHT: 65 IN | SYSTOLIC BLOOD PRESSURE: 132 MMHG | DIASTOLIC BLOOD PRESSURE: 80 MMHG | WEIGHT: 200 LBS | BODY MASS INDEX: 33.32 KG/M2 | TEMPERATURE: 97.9 F

## 2020-01-21 PROCEDURE — 99213 OFFICE O/P EST LOW 20 MIN: CPT

## 2020-01-21 NOTE — REVIEW OF SYSTEMS
[Breast Pain] : breast pain [Breast Reddening] : reddening of the breast [Fever] : no fever [Chills] : no chills [Breast Swelling] : no breast swelling [Breast Itching] : no breast itching [Breast Warmth] : no breast warmth [Enlargement] : no breast enlargement [Dimpling Of Skin] : no dimpling of breast skin [Decreasing In Size] : breast size not decreasing [de-identified] : pain scale 7-10/10.  ['Orange Peel' Appearance] : no 'orange peel' appearance of breast skin

## 2020-01-21 NOTE — ASSESSMENT
[FreeTextEntry1] : 74 year old female with history of right breast estrogen receptor positive, HER2 negative invasive well to moderately differentiated ductal carcinoma on core biopsy of two areas status post bilateral simple mastectomy with bilateral SLNB 9/18/19.  She presents today to discuss excision of bilateral axillary masses. \par \par On physical exam, there are no discreet masses in either breast or axilla.  There is slight erythema of the right axillary mass.  We had a lengthy discussion regarding her surgical options.  She agrees to bilateral chest wall revision which can be performed as an outpatient procedure.  Her daughter translated for her.  All of the risks and benefits were discussed, including but not limited to, long term pain and post operative infection.  She is aware that her surgery will require the placement of drains, either GRACY or Lance, and will need VNS nursing care post operatively until they are discontinued.  I spent a total of 20 minutes of face to face time with this patient, greater than 50% of which was spent in counseling and/or coordination of care.  All of her questions were appropriately answered.

## 2020-01-21 NOTE — HISTORY OF PRESENT ILLNESS
[FreeTextEntry1] : PCP: Glenroy Hutchins MD\par \par Patient with right breast invasive well to moderately differentiated ductal carcinoma on ultrasound guided core biopsy 6/12/19; 2:00 N8, 19 mm (cork).  \par Estrogen receptor positive, \par Progesterone receptor positive, \par HER2 negative, 1+\par Ki-67: 10-15%\par \par Right breast invasive well to moderately differentiated ductal carcinoma on ultrasound guided core biopsy 6/12/19; 2:00 N10, 10 mm (tophat).   \par Estrogen receptor positive, \par Progesterone receptor positive, \par HER2 negative, 1+\par Ki-67: 10-15%\par \par Left breast fibroadenoma on ultrasound guided core biopsy 6/12/19; 1:00 N5, (hourglass).  Findings are benign concordant and short term follow up left breast ultrasound recommended in six months.  \par \par No prior complaints related to the breasts. \par Her family history is significant for her sister with breast cancer at 68; her maternal aunt with breast cancer at 65; and her niece (sister's daughter) with breast cancer at 54.  \par \par Bilateral Breast MRI 7/26/19: 5 cm area of clumped non mass enhancement in the lateral aspect of the left breast; MR guided core biopsy recommended.  Per patient and her son, she prefers bilateral mastectomy and therefore biopsy was not indicated. \par \par Status post bilateral simple mastectomy and SLNB 9/18/19 demonstrating Left 0/1 (-); Left hyalinized fibroadenoma.  Right 0/1; Right breast two healing prior biopsy sites in the UIQ with invasive moderately differentiated ductal carcinoma, 22 mm associated with calcifications and DCIS, cribiform and micropapillary types, intermediate nuclear grade; with no definitive foci of LVI.  AJCC 8th Edition Pathologic Stage: pT2, p(sn)N0, pMx.\par \par Patient started antibiotics 4 days after discharge from hospital. \par \par Oncotype DX RS 6\par On Tamoxifen with Dr. Laws

## 2020-01-21 NOTE — REASON FOR VISIT
[Consultation] : a consultation visit [Other: _____] : [unfilled] [FreeTextEntry1] : bilateral axillary tissue excision.

## 2020-01-21 NOTE — PHYSICAL EXAM
[Normocephalic] : normocephalic [Atraumatic] : atraumatic [Supple] : supple [No Supraclavicular Adenopathy] : no supraclavicular adenopathy [No Cervical Adenopathy] : no cervical adenopathy [No Thyromegaly] : no thyromegaly [Examined in the supine and seated position] : examined in the supine and seated position [Symmetrical] : symmetrical [No dominant masses] : no dominant masses in right breast  [Breast Mass Right Breast ___cm] : no masses [No dominant masses] : no dominant masses left breast [No Axillary Lymphadenopathy] : no right axillary lymphadenopathy [Breast Mass Left Breast ___cm] : no masses [No Edema] : no edema [No Swelling] : no swelling [Full ROM] : full range of motion [No Rashes] : no rashes [No Ulceration] : no ulceration [de-identified] : bilateral axillary/chest wall masses.

## 2020-01-28 ENCOUNTER — FORM ENCOUNTER (OUTPATIENT)
Age: 75
End: 2020-01-28

## 2020-01-29 ENCOUNTER — OUTPATIENT (OUTPATIENT)
Dept: OUTPATIENT SERVICES | Facility: HOSPITAL | Age: 75
LOS: 1 days | Discharge: HOME | End: 2020-01-29
Payer: MEDICAID

## 2020-01-29 VITALS
HEART RATE: 68 BPM | TEMPERATURE: 98 F | RESPIRATION RATE: 18 BRPM | HEIGHT: 69 IN | WEIGHT: 231.49 LBS | SYSTOLIC BLOOD PRESSURE: 120 MMHG | DIASTOLIC BLOOD PRESSURE: 80 MMHG | OXYGEN SATURATION: 98 %

## 2020-01-29 DIAGNOSIS — Z90.49 ACQUIRED ABSENCE OF OTHER SPECIFIED PARTS OF DIGESTIVE TRACT: Chronic | ICD-10-CM

## 2020-01-29 DIAGNOSIS — C50.411 MALIGNANT NEOPLASM OF UPPER-OUTER QUADRANT OF RIGHT FEMALE BREAST: ICD-10-CM

## 2020-01-29 DIAGNOSIS — Z98.51 TUBAL LIGATION STATUS: Chronic | ICD-10-CM

## 2020-01-29 DIAGNOSIS — Z01.818 ENCOUNTER FOR OTHER PREPROCEDURAL EXAMINATION: ICD-10-CM

## 2020-01-29 DIAGNOSIS — Z98.890 OTHER SPECIFIED POSTPROCEDURAL STATES: Chronic | ICD-10-CM

## 2020-01-29 DIAGNOSIS — Z90.89 ACQUIRED ABSENCE OF OTHER ORGANS: Chronic | ICD-10-CM

## 2020-01-29 LAB
ALBUMIN SERPL ELPH-MCNC: 4.2 G/DL — SIGNIFICANT CHANGE UP (ref 3.5–5.2)
ALP SERPL-CCNC: 53 U/L — SIGNIFICANT CHANGE UP (ref 30–115)
ALT FLD-CCNC: 10 U/L — SIGNIFICANT CHANGE UP (ref 0–41)
ANION GAP SERPL CALC-SCNC: 11 MMOL/L — SIGNIFICANT CHANGE UP (ref 7–14)
APTT BLD: 27.7 SEC — SIGNIFICANT CHANGE UP (ref 27–39.2)
AST SERPL-CCNC: 17 U/L — SIGNIFICANT CHANGE UP (ref 0–41)
BASOPHILS # BLD AUTO: 0.04 K/UL — SIGNIFICANT CHANGE UP (ref 0–0.2)
BASOPHILS NFR BLD AUTO: 0.7 % — SIGNIFICANT CHANGE UP (ref 0–1)
BILIRUB SERPL-MCNC: 0.4 MG/DL — SIGNIFICANT CHANGE UP (ref 0.2–1.2)
BUN SERPL-MCNC: 13 MG/DL — SIGNIFICANT CHANGE UP (ref 10–20)
CALCIUM SERPL-MCNC: 9.4 MG/DL — SIGNIFICANT CHANGE UP (ref 8.5–10.1)
CHLORIDE SERPL-SCNC: 103 MMOL/L — SIGNIFICANT CHANGE UP (ref 98–110)
CO2 SERPL-SCNC: 25 MMOL/L — SIGNIFICANT CHANGE UP (ref 17–32)
CREAT SERPL-MCNC: 0.8 MG/DL — SIGNIFICANT CHANGE UP (ref 0.7–1.5)
EOSINOPHIL # BLD AUTO: 0.17 K/UL — SIGNIFICANT CHANGE UP (ref 0–0.7)
EOSINOPHIL NFR BLD AUTO: 2.9 % — SIGNIFICANT CHANGE UP (ref 0–8)
GLUCOSE SERPL-MCNC: 98 MG/DL — SIGNIFICANT CHANGE UP (ref 70–99)
HCT VFR BLD CALC: 39.9 % — SIGNIFICANT CHANGE UP (ref 37–47)
HGB BLD-MCNC: 13.7 G/DL — SIGNIFICANT CHANGE UP (ref 12–16)
IMM GRANULOCYTES NFR BLD AUTO: 0.3 % — SIGNIFICANT CHANGE UP (ref 0.1–0.3)
INR BLD: 1.08 RATIO — SIGNIFICANT CHANGE UP (ref 0.65–1.3)
LYMPHOCYTES # BLD AUTO: 1.7 K/UL — SIGNIFICANT CHANGE UP (ref 1.2–3.4)
LYMPHOCYTES # BLD AUTO: 28.9 % — SIGNIFICANT CHANGE UP (ref 20.5–51.1)
MCHC RBC-ENTMCNC: 30.2 PG — SIGNIFICANT CHANGE UP (ref 27–31)
MCHC RBC-ENTMCNC: 34.3 G/DL — SIGNIFICANT CHANGE UP (ref 32–37)
MCV RBC AUTO: 88.1 FL — SIGNIFICANT CHANGE UP (ref 81–99)
MONOCYTES # BLD AUTO: 0.52 K/UL — SIGNIFICANT CHANGE UP (ref 0.1–0.6)
MONOCYTES NFR BLD AUTO: 8.8 % — SIGNIFICANT CHANGE UP (ref 1.7–9.3)
NEUTROPHILS # BLD AUTO: 3.44 K/UL — SIGNIFICANT CHANGE UP (ref 1.4–6.5)
NEUTROPHILS NFR BLD AUTO: 58.4 % — SIGNIFICANT CHANGE UP (ref 42.2–75.2)
NRBC # BLD: 0 /100 WBCS — SIGNIFICANT CHANGE UP (ref 0–0)
PLATELET # BLD AUTO: 275 K/UL — SIGNIFICANT CHANGE UP (ref 130–400)
POTASSIUM SERPL-MCNC: 4.6 MMOL/L — SIGNIFICANT CHANGE UP (ref 3.5–5)
POTASSIUM SERPL-SCNC: 4.6 MMOL/L — SIGNIFICANT CHANGE UP (ref 3.5–5)
PROT SERPL-MCNC: 6.9 G/DL — SIGNIFICANT CHANGE UP (ref 6–8)
PROTHROM AB SERPL-ACNC: 12.4 SEC — SIGNIFICANT CHANGE UP (ref 9.95–12.87)
RBC # BLD: 4.53 M/UL — SIGNIFICANT CHANGE UP (ref 4.2–5.4)
RBC # FLD: 12.9 % — SIGNIFICANT CHANGE UP (ref 11.5–14.5)
SODIUM SERPL-SCNC: 139 MMOL/L — SIGNIFICANT CHANGE UP (ref 135–146)
WBC # BLD: 5.89 K/UL — SIGNIFICANT CHANGE UP (ref 4.8–10.8)
WBC # FLD AUTO: 5.89 K/UL — SIGNIFICANT CHANGE UP (ref 4.8–10.8)

## 2020-01-29 PROCEDURE — 71046 X-RAY EXAM CHEST 2 VIEWS: CPT | Mod: 26

## 2020-01-29 PROCEDURE — 93010 ELECTROCARDIOGRAM REPORT: CPT

## 2020-01-29 NOTE — H&P PST ADULT - HISTORY OF PRESENT ILLNESS
73 Y/O FEMALE PRESENTS TO PAST WITH HX BREAST CA PT NOW FOR SCHEDULED PROCEDURE. PT DENIES ANY CP SOB PALP COUGH DYSURIA FEVER URI. PT ABLE TO LAZARO 1-2 FOS W/O SOB

## 2020-02-06 ENCOUNTER — APPOINTMENT (OUTPATIENT)
Dept: HEMATOLOGY ONCOLOGY | Facility: CLINIC | Age: 75
End: 2020-02-06
Payer: MEDICAID

## 2020-02-06 ENCOUNTER — OUTPATIENT (OUTPATIENT)
Dept: OUTPATIENT SERVICES | Facility: HOSPITAL | Age: 75
LOS: 1 days | Discharge: HOME | End: 2020-02-06

## 2020-02-06 VITALS
HEART RATE: 95 BPM | TEMPERATURE: 96.3 F | DIASTOLIC BLOOD PRESSURE: 80 MMHG | BODY MASS INDEX: 39.49 KG/M2 | SYSTOLIC BLOOD PRESSURE: 174 MMHG | HEIGHT: 65 IN | WEIGHT: 237 LBS

## 2020-02-06 DIAGNOSIS — Z90.49 ACQUIRED ABSENCE OF OTHER SPECIFIED PARTS OF DIGESTIVE TRACT: Chronic | ICD-10-CM

## 2020-02-06 DIAGNOSIS — C50.411 MALIGNANT NEOPLASM OF UPPER-OUTER QUADRANT OF RIGHT FEMALE BREAST: ICD-10-CM

## 2020-02-06 DIAGNOSIS — Z90.89 ACQUIRED ABSENCE OF OTHER ORGANS: Chronic | ICD-10-CM

## 2020-02-06 DIAGNOSIS — Z98.51 TUBAL LIGATION STATUS: Chronic | ICD-10-CM

## 2020-02-06 DIAGNOSIS — Z98.890 OTHER SPECIFIED POSTPROCEDURAL STATES: Chronic | ICD-10-CM

## 2020-02-06 PROCEDURE — 99214 OFFICE O/P EST MOD 30 MIN: CPT

## 2020-02-06 NOTE — ASSESSMENT
[FreeTextEntry1] : 74 yof, newly diagnosed with stage IIA, HR positive, HER2 negative breast cancer s/p B/L mastectomy, presents to discuss treatment with antihormonal therapy.\par \par 1. Stage IIA HR positive HER2 negative breast cancer-\par \par On Tamoxifen since 11/19, tolerating it well.\par She will continue with tamoxifen 20 mg daily.\par Hold tamoxifen 1 week prior to surgery and resume post op once ambulating.\par Side effects of Tamoxifen including hot flashes, menstrual irregularities, weight gain, mood changes, DVT, cataracts and uterine cancer were discussed.\par  Compliance assessed on todays visit.\par Reminded to go for DEXA scan.\par Recommended exercise and dietary modification for wt loss.\par GYN evaluation annually ( pt has never seen GYN in [place])\par Son served as a  per pt choice\par Follow up in 5 months. \par \par \par \par \par

## 2020-02-06 NOTE — OB HISTORY
[Menarche Age: ____] : age at menarche was [unfilled] [Menopause Age: ____] : age at menopause was [unfilled] [Currently In Menopause] : currently in menopause [___] : Full Term: [unfilled]

## 2020-02-06 NOTE — PHYSICAL EXAM
[Fully active, able to carry on all pre-disease performance without restriction] : Status 0 - Fully active, able to carry on all pre-disease performance without restriction [Obese] : obese [Normal] : full range of motion and no deformities appreciated [de-identified] : S/p B/L mastectomy.  excess tissue in both axillae [de-identified] : obese [de-identified] : Redness and warmth of right chest wall [de-identified] : Ambulates with cane

## 2020-02-06 NOTE — REASON FOR VISIT
[Follow-Up Visit] : a follow-up [Family Member] : family member [Patient Declined  Services] : - None: Patient declined  services [FreeTextEntry2] : breast cancer [FreeTextEntry3] : Preferred daughter to translate. [TWNoteComboBox1] : Salvadorean

## 2020-02-06 NOTE — REVIEW OF SYSTEMS
[Negative] : Psychiatric [FreeTextEntry9] : chronic low back and B/L knee pain [de-identified] : persistent cellulitis R chest wall

## 2020-02-06 NOTE — HISTORY OF PRESENT ILLNESS
[de-identified] : 74 yof, PMH of asthma, HTN, hypothyroidism, severe osteoarthritis (receives cortisone injections) presents for initial evaluation of R-sided breast cancer. \par \par  Patient palpated a mass in her right breast and underwent ultrasound-guided core biopsy, which showed invasive ductal carcinoma well to moderately differentiated ER/LA positive %, HER2 negative, Ki-67 10-15%.  B/L breast MRI revealed 5 cm non-mass enhancement lateral left breast.  No biopsy was done secondary to patient preference to undergo B/L mastectomy and sentinel lymph node biopsy.  Pathology from B/L mastectomy and SLNB revealed left breast fibroadenoma, right breast invasive ductal carcinoma, moderately differentiated, 22 mm, a/w calcifications and DCIS, no LVI, negative margins. \par \par  Pathology pT2p(sn)N0pMx (stage II A).  ER >90%, LA >90%, Ki-67 10-15%.  Oncotype dx score is 6.  Patient now presents with daughter regarding initiation of antihormonal therapy.\par \par Patient experienced menarche at age 11 and menopause at age 49.  She is  and has never .  She took OCPs for about one year in late 20s.  Never took hormone replacement therapy.\par \par  [de-identified] : 2/6/20\par  Patient here for follow up, feeling well.  She denies any new complaints.  Patient denies any new palpable breast lumps or pain, denies skin changes, denies nipple discharge.  Patient denies cough, shortness of breath, denies fever, denies bone pain.\par She started taking Tamoxifen since Nov 2019, tolerating it well.\par Denies hot flashes, sweating.\par Pt is going for b/l axillary fat excision.

## 2020-02-18 NOTE — ASU PATIENT PROFILE, ADULT - TEACHING/LEARNING RELIGIOUS CONSIDERATIONS
----- Message from [facility] sent at 5/2/2018 12:39 PM CDT -----  Calcium and phosphorous levels normal. Electrolytes and kidney function also normal. Her protein levels was low. Focus on eating high protein diet.  
I called and spoke to patient relaying the result message below.  Patient had no questions or concerns at this time.  
Left message for return call.  
none

## 2020-02-19 ENCOUNTER — RESULT REVIEW (OUTPATIENT)
Age: 75
End: 2020-02-19

## 2020-02-19 ENCOUNTER — APPOINTMENT (OUTPATIENT)
Dept: BREAST CENTER | Facility: AMBULATORY SURGERY CENTER | Age: 75
End: 2020-02-19
Payer: MEDICAID

## 2020-02-19 ENCOUNTER — OUTPATIENT (OUTPATIENT)
Dept: OUTPATIENT SERVICES | Facility: HOSPITAL | Age: 75
LOS: 1 days | Discharge: HOME | End: 2020-02-19
Payer: MEDICAID

## 2020-02-19 VITALS
OXYGEN SATURATION: 97 % | DIASTOLIC BLOOD PRESSURE: 77 MMHG | TEMPERATURE: 99 F | WEIGHT: 231.49 LBS | HEIGHT: 69 IN | HEART RATE: 77 BPM | SYSTOLIC BLOOD PRESSURE: 148 MMHG | RESPIRATION RATE: 18 BRPM

## 2020-02-19 VITALS
HEART RATE: 82 BPM | OXYGEN SATURATION: 99 % | DIASTOLIC BLOOD PRESSURE: 70 MMHG | RESPIRATION RATE: 18 BRPM | SYSTOLIC BLOOD PRESSURE: 119 MMHG

## 2020-02-19 DIAGNOSIS — L90.5 SCAR CONDITIONS AND FIBROSIS OF SKIN: ICD-10-CM

## 2020-02-19 DIAGNOSIS — Z98.51 TUBAL LIGATION STATUS: ICD-10-CM

## 2020-02-19 DIAGNOSIS — Z98.890 OTHER SPECIFIED POSTPROCEDURAL STATES: Chronic | ICD-10-CM

## 2020-02-19 DIAGNOSIS — F41.9 ANXIETY DISORDER, UNSPECIFIED: ICD-10-CM

## 2020-02-19 DIAGNOSIS — Z79.810 LONG TERM (CURRENT) USE OF SELECTIVE ESTROGEN RECEPTOR MODULATORS (SERMS): ICD-10-CM

## 2020-02-19 DIAGNOSIS — C50.912 MALIGNANT NEOPLASM OF UNSPECIFIED SITE OF LEFT FEMALE BREAST: ICD-10-CM

## 2020-02-19 DIAGNOSIS — Z87.891 PERSONAL HISTORY OF NICOTINE DEPENDENCE: ICD-10-CM

## 2020-02-19 DIAGNOSIS — Z90.49 ACQUIRED ABSENCE OF OTHER SPECIFIED PARTS OF DIGESTIVE TRACT: Chronic | ICD-10-CM

## 2020-02-19 DIAGNOSIS — L82.1 OTHER SEBORRHEIC KERATOSIS: ICD-10-CM

## 2020-02-19 DIAGNOSIS — E03.9 HYPOTHYROIDISM, UNSPECIFIED: ICD-10-CM

## 2020-02-19 DIAGNOSIS — Z90.89 ACQUIRED ABSENCE OF OTHER ORGANS: Chronic | ICD-10-CM

## 2020-02-19 DIAGNOSIS — J44.9 CHRONIC OBSTRUCTIVE PULMONARY DISEASE, UNSPECIFIED: ICD-10-CM

## 2020-02-19 DIAGNOSIS — Z88.0 ALLERGY STATUS TO PENICILLIN: ICD-10-CM

## 2020-02-19 DIAGNOSIS — F32.9 MAJOR DEPRESSIVE DISORDER, SINGLE EPISODE, UNSPECIFIED: ICD-10-CM

## 2020-02-19 DIAGNOSIS — Z90.49 ACQUIRED ABSENCE OF OTHER SPECIFIED PARTS OF DIGESTIVE TRACT: ICD-10-CM

## 2020-02-19 DIAGNOSIS — L98.7 EXCESSIVE AND REDUNDANT SKIN AND SUBCUTANEOUS TISSUE: ICD-10-CM

## 2020-02-19 DIAGNOSIS — C50.911 MALIGNANT NEOPLASM OF UNSPECIFIED SITE OF RIGHT FEMALE BREAST: ICD-10-CM

## 2020-02-19 DIAGNOSIS — Z98.51 TUBAL LIGATION STATUS: Chronic | ICD-10-CM

## 2020-02-19 DIAGNOSIS — M19.90 UNSPECIFIED OSTEOARTHRITIS, UNSPECIFIED SITE: ICD-10-CM

## 2020-02-19 DIAGNOSIS — Z90.13 ACQUIRED ABSENCE OF BILATERAL BREASTS AND NIPPLES: ICD-10-CM

## 2020-02-19 PROCEDURE — 19380 REVJ RECONSTRUCTED BREAST: CPT | Mod: 50

## 2020-02-19 PROCEDURE — 88304 TISSUE EXAM BY PATHOLOGIST: CPT | Mod: 26

## 2020-02-19 RX ORDER — DIPHENHYDRAMINE HCL 50 MG
12.5 CAPSULE ORAL ONCE
Refills: 0 | Status: DISCONTINUED | OUTPATIENT
Start: 2020-02-19 | End: 2020-03-07

## 2020-02-19 RX ORDER — CIPROFLOXACIN HYDROCHLORIDE 500 MG/1
500 TABLET, FILM COATED ORAL
Qty: 20 | Refills: 0 | Status: COMPLETED | COMMUNITY
Start: 2020-02-19 | End: 2020-02-29

## 2020-02-19 RX ORDER — SODIUM CHLORIDE 9 MG/ML
1000 INJECTION, SOLUTION INTRAVENOUS
Refills: 0 | Status: DISCONTINUED | OUTPATIENT
Start: 2020-02-19 | End: 2020-03-07

## 2020-02-19 RX ORDER — ONDANSETRON 8 MG/1
4 TABLET, FILM COATED ORAL ONCE
Refills: 0 | Status: DISCONTINUED | OUTPATIENT
Start: 2020-02-19 | End: 2020-03-07

## 2020-02-19 RX ORDER — MORPHINE SULFATE 50 MG/1
2 CAPSULE, EXTENDED RELEASE ORAL
Refills: 0 | Status: DISCONTINUED | OUTPATIENT
Start: 2020-02-19 | End: 2020-03-07

## 2020-02-19 RX ADMIN — SODIUM CHLORIDE 100 MILLILITER(S): 9 INJECTION, SOLUTION INTRAVENOUS at 16:20

## 2020-02-19 NOTE — BRIEF OPERATIVE NOTE - NSICDXBRIEFPROCEDURE_GEN_ALL_CORE_FT
PROCEDURES:  Excision of chest subcutaneous tissue and fascia, percutaneous approach 19-Feb-2020 15:23:56  Geno Candelario L

## 2020-02-19 NOTE — ASU DISCHARGE PLAN (ADULT/PEDIATRIC) - ASU DC SPECIAL INSTRUCTIONSFT
Regular Diet.    No heavy lifting more than 15 pounds for two weeks.    LEAVE ALL DRESSINGS IN PLACE; Visiting Nursing for wound and drain care.  DO NOT SHOWER.     No shaving for 4 weeks if surgery was performed in or near the axilla (armpit).      Wear a supportive bra.

## 2020-02-19 NOTE — ASU DISCHARGE PLAN (ADULT/PEDIATRIC) - CARE PROVIDER_API CALL
Geno Candelario)  Sur[facility]  [facility], [address] [place] [address]  Phone: [phone]  Fax: [phone]  Established Patient  Scheduled Appointment: 03/05/2020 10:15 AM

## 2020-02-19 NOTE — ASU DISCHARGE PLAN (ADULT/PEDIATRIC) - PROVIDER TOKENS
PROVIDER:[TOKEN:[[id number]],SCHEDULEDAPPT:[03/05/2020],SCHEDULEDAPPTTIME:[10:15 AM],ESTABLISHEDPATIENT:[T]]

## 2020-02-24 LAB — SURGICAL PATHOLOGY STUDY: SIGNIFICANT CHANGE UP

## 2020-02-25 ENCOUNTER — APPOINTMENT (OUTPATIENT)
Dept: BREAST CENTER | Facility: CLINIC | Age: 75
End: 2020-02-25

## 2020-02-25 ENCOUNTER — APPOINTMENT (OUTPATIENT)
Dept: BREAST CENTER | Facility: CLINIC | Age: 75
End: 2020-02-25
Payer: MEDICAID

## 2020-02-27 ENCOUNTER — APPOINTMENT (OUTPATIENT)
Dept: BREAST CENTER | Facility: CLINIC | Age: 75
End: 2020-02-27
Payer: MEDICAID

## 2020-02-27 VITALS
DIASTOLIC BLOOD PRESSURE: 80 MMHG | SYSTOLIC BLOOD PRESSURE: 142 MMHG | HEIGHT: 65 IN | WEIGHT: 237 LBS | BODY MASS INDEX: 39.49 KG/M2 | TEMPERATURE: 98.4 F

## 2020-02-27 PROCEDURE — 99024 POSTOP FOLLOW-UP VISIT: CPT

## 2020-03-03 ENCOUNTER — APPOINTMENT (OUTPATIENT)
Dept: BREAST CENTER | Facility: CLINIC | Age: 75
End: 2020-03-03

## 2020-03-04 ENCOUNTER — APPOINTMENT (OUTPATIENT)
Dept: ORTHOPEDIC SURGERY | Facility: CLINIC | Age: 75
End: 2020-03-04

## 2020-03-09 NOTE — DATA REVIEWED
[FreeTextEntry1] : Surgical Final Report\par \par \par \par \par Final Diagnosis\par 1.  Chest wall, left lateral skin scar, excision/revision:\par -  Benign skin and subcutaneous adipose tissue with dermal scar\par and focal post surgical site changes; 143 grams.\par \par 2.  Chest wall, right lateral skin scar, excision/revision:\par -  Benign skin and subcutaneous adipose tissue with a small\par seborrheic keratosis, dermal scar, and focal post\par surgical site changes; 274 grams.\par \par Verified by: Jatinder Matute M.D.\par (Electronic Signature)\par Reported on: 02/24/20 12:09 EST, [address] [facility],\[place] [address]\par Phone: [phone]   Fax: [phone]\par

## 2020-03-09 NOTE — ASSESSMENT
[FreeTextEntry1] : 74 year old female with history of right breast estrogen receptor positive, HER2 negative invasive well to moderately differentiated ductal carcinoma on core biopsy of two areas status post bilateral simple mastectomy with bilateral SLNB 9/18/19.  Oncotype DX RS was low and chemotherapy was not indicated.  She is on Tamoxifen with Dr. Hartley.  She  is status post bilateral chest wall revision 2/19/20.  She presents today for her post operative visit.  \par \par On physical exam, her incisions are healing well and her drains were removed today.  She has some erythema at the incision site.  Her pathology results were reviewed. For now, she will follow up in three months for another post operative check.  She will discontinue Cipro tonight after her second dose, and start Bactrim DS twice a day for ten days.  All of her questions were appropriately answered.

## 2020-03-09 NOTE — HISTORY OF PRESENT ILLNESS
[FreeTextEntry1] : PCP: Glenroy Hutchins MD\par \par Patient with right breast invasive well to moderately differentiated ductal carcinoma on ultrasound guided core biopsy 6/12/19; 2:00 N8, 19 mm (cork).  \par Estrogen receptor positive, \par Progesterone receptor positive, \par HER2 negative, 1+\par Ki-67: 10-15%\par \par Right breast invasive well to moderately differentiated ductal carcinoma on ultrasound guided core biopsy 6/12/19; 2:00 N10, 10 mm (tophat).   \par Estrogen receptor positive, \par Progesterone receptor positive, \par HER2 negative, 1+\par Ki-67: 10-15%\par \par Left breast fibroadenoma on ultrasound guided core biopsy 6/12/19; 1:00 N5, (hourglass).  Findings are benign concordant and short term follow up left breast ultrasound recommended in six months.  \par \par No prior complaints related to the breasts. \par Her family history is significant for her sister with breast cancer at 68; her maternal aunt with breast cancer at 65; and her niece (sister's daughter) with breast cancer at 54.  \par \par Bilateral Breast MRI 7/26/19: 5 cm area of clumped non mass enhancement in the lateral aspect of the left breast; MR guided core biopsy recommended.  Per patient and her son, she prefers bilateral mastectomy and therefore biopsy was not indicated. \par \par Status post bilateral simple mastectomy and SLNB 9/18/19 demonstrating Left 0/1 (-); Left hyalinized fibroadenoma.  Right 0/1; Right breast two healing prior biopsy sites in the UIQ with invasive moderately differentiated ductal carcinoma, 22 mm associated with calcifications and DCIS, cribiform and micropapillary types, intermediate nuclear grade; with no definitive foci of LVI.  AJCC 8th Edition Pathologic Stage: pT2, p(sn)N0, pMx.\par \par Patient started antibiotics 4 days after discharge from hospital. \par \par Oncotype DX RS 6\par On Tamoxifen with Dr. Hartley.  \par \par Status post bilateral chest wall revision 2/19/20 demonstrating left benign skin and subcutaneous adipose tissue with dermal scar, 143 grams, and right benign skin and subcutaneous adipose tissue with small seborrheic keratosis, dermal scar, 274 grams.

## 2020-03-09 NOTE — CONSULT LETTER
[Dear  ___] : Dear  [unfilled], [Please see my note below.] : Please see my note below. [Sincerely,] : Sincerely, [FreeTextEntry2] : Glenroy Hutchins M.D.\par [address] [place] [address], [address]\[place] [address]  [FreeTextEntry1] : This letter is in regard to our mutual patient who was seen for her post-operative visit.  Enclosed is a copy of her pathology results.\par \par We will continue to take excellent care of your patient.\par \par Please feel free to contact our office with any questions or concerns.  [FreeTextEntry3] : Geno Candelario M.D., F.A.C.S.

## 2020-03-10 ENCOUNTER — APPOINTMENT (OUTPATIENT)
Dept: BREAST CENTER | Facility: CLINIC | Age: 75
End: 2020-03-10

## 2020-05-28 ENCOUNTER — APPOINTMENT (OUTPATIENT)
Dept: HEMATOLOGY ONCOLOGY | Facility: CLINIC | Age: 75
End: 2020-05-28

## 2020-06-01 NOTE — ED PROVIDER NOTE - ATTENDING CONTRIBUTION TO CARE
[General Appearance - In No Acute Distress] : in no acute distress [General Appearance - Alert] : alert [Extraocular Movements] : extraocular movements were intact [Sclera] : the sclera and conjunctiva were normal [Examination Of The Oral Cavity] : the lips and gums were normal [Neck Appearance] : the appearance of the neck was normal [Outer Ear] : the ears and nose were normal in appearance See MDM section above for attending physician contribution to care. [Neck Cervical Mass (___cm)] : no neck mass was observed [Exaggerated Use Of Accessory Muscles For Inspiration] : no accessory muscle use [Heart Rate And Rhythm] : heart rate was normal and rhythm regular [Auscultation Breath Sounds / Voice Sounds] : lungs were clear to auscultation bilaterally [Heart Sounds] : normal S1 and S2 [Heart Sounds Gallop] : no gallops [Murmurs] : no murmurs [Heart Sounds Pericardial Friction Rub] : no pericardial rub [Supraclavicular Lymph Nodes Enlarged Bilaterally] : supraclavicular [Cervical Lymph Nodes Enlarged Anterior Bilaterally] : anterior cervical [No CVA Tenderness] : no ~M costovertebral angle tenderness [No Spinal Tenderness] : no spinal tenderness [] : no rash [Abnormal Walk] : normal gait [No Focal Deficits] : no focal deficits [Oriented To Time, Place, And Person] : oriented to person, place, and time [Impaired Insight] : insight and judgment were intact [Affect] : the affect was normal [Edema] : there was no peripheral edema [Involuntary Movements] : no involuntary movements were seen

## 2020-06-30 ENCOUNTER — APPOINTMENT (OUTPATIENT)
Dept: BREAST CENTER | Facility: CLINIC | Age: 75
End: 2020-06-30
Payer: MEDICAID

## 2020-07-09 ENCOUNTER — APPOINTMENT (OUTPATIENT)
Dept: HEMATOLOGY ONCOLOGY | Facility: CLINIC | Age: 75
End: 2020-07-09
Payer: MEDICAID

## 2020-07-09 ENCOUNTER — OUTPATIENT (OUTPATIENT)
Dept: OUTPATIENT SERVICES | Facility: HOSPITAL | Age: 75
LOS: 1 days | Discharge: HOME | End: 2020-07-09

## 2020-07-09 VITALS
WEIGHT: 240 LBS | DIASTOLIC BLOOD PRESSURE: 73 MMHG | BODY MASS INDEX: 39.99 KG/M2 | OXYGEN SATURATION: 99 % | RESPIRATION RATE: 16 BRPM | HEIGHT: 65 IN | HEART RATE: 76 BPM | SYSTOLIC BLOOD PRESSURE: 161 MMHG | TEMPERATURE: 96.2 F

## 2020-07-09 DIAGNOSIS — Z98.51 TUBAL LIGATION STATUS: Chronic | ICD-10-CM

## 2020-07-09 DIAGNOSIS — Z90.89 ACQUIRED ABSENCE OF OTHER ORGANS: Chronic | ICD-10-CM

## 2020-07-09 DIAGNOSIS — Z90.49 ACQUIRED ABSENCE OF OTHER SPECIFIED PARTS OF DIGESTIVE TRACT: Chronic | ICD-10-CM

## 2020-07-09 DIAGNOSIS — Z98.890 OTHER SPECIFIED POSTPROCEDURAL STATES: Chronic | ICD-10-CM

## 2020-07-09 PROCEDURE — 99214 OFFICE O/P EST MOD 30 MIN: CPT

## 2020-07-12 NOTE — PHYSICAL EXAM
[Fully active, able to carry on all pre-disease performance without restriction] : Status 0 - Fully active, able to carry on all pre-disease performance without restriction [Obese] : obese [Normal] : affect appropriate [de-identified] : S/p B/L mastectomy.  excess tissue in both axillae [de-identified] : Ambulates with cane [de-identified] : obese

## 2020-07-12 NOTE — HISTORY OF PRESENT ILLNESS
[de-identified] : 74 yof, PMH of asthma, HTN, hypothyroidism, severe osteoarthritis (receives cortisone injections) presents for initial evaluation of R-sided breast cancer. \par \par  Patient palpated a mass in her right breast and underwent ultrasound-guided core biopsy, which showed invasive ductal carcinoma well to moderately differentiated ER/AR positive %, HER2 negative, Ki-67 10-15%.  B/L breast MRI revealed 5 cm non-mass enhancement lateral left breast.  No biopsy was done secondary to patient preference to undergo B/L mastectomy and sentinel lymph node biopsy.  Pathology from B/L mastectomy and SLNB revealed left breast fibroadenoma, right breast invasive ductal carcinoma, moderately differentiated, 22 mm, a/w calcifications and DCIS, no LVI, negative margins. \par \par  Pathology pT2p(sn)N0pMx (stage II A).  ER >90%, AR >90%, Ki-67 10-15%.  Oncotype dx score is 6.  Patient now presents with daughter regarding initiation of antihormonal therapy.\par \par Patient experienced menarche at age 11 and menopause at age 49.  She is  and has never .  She took OCPs for about one year in late 20s.  Never took hormone replacement therapy.\par \par  [de-identified] : 2/6/20\par  Patient here for follow up, feeling well.  She denies any new complaints.  Patient denies any new palpable breast lumps or pain, denies skin changes, denies nipple discharge.  Patient denies cough, shortness of breath, denies fever, denies bone pain.\par She started taking Tamoxifen since Nov 2019, tolerating it well.\par Denies hot flashes, sweating.\par Pt is going for b/l axillary fat excision.\par \par 7/9/20\par  Patient here for follow up, feeling well.  She denies any new complaints.  Patient denies any new palpable  lumps or pain, denies skin changes.  Patient denies cough, shortness of breath, denies fever, denies bone pain.\par Compliant with tamoxifen, tolerating it well.\par

## 2020-07-12 NOTE — REASON FOR VISIT
[Follow-Up Visit] : a follow-up [Family Member] : family member [Patient Declined  Services] : - None: Patient declined  services [FreeTextEntry2] : breast cancer [FreeTextEntry3] : Preferred daughter to translate. [TWNoteComboBox1] : Rwandan

## 2020-07-12 NOTE — ASSESSMENT
[FreeTextEntry1] : 74 yof, newly diagnosed with stage IIA, HR positive, HER2 negative breast cancer s/p B/L mastectomy\par \par On Tamoxifen since 11/19, tolerating it well.\par \par RECS\par She will continue with tamoxifen 20 mg daily.\par Side effects of Tamoxifen including hot flashes, weight gain, mood changes, DVT, cataracts and uterine cancer were discussed.\par  Compliance assessed on todays visit.\par Reminded to go for DEXA scan.\par Recommended exercise and dietary modification for wt loss.\par GYN evaluation annually ( pt has never seen GYN in [place])\par Son served as a  per pt choice\par Follow up in 6 months. \par \par \par \par \par

## 2020-07-12 NOTE — REVIEW OF SYSTEMS
[Negative] : Heme/Lymph [FreeTextEntry9] : chronic low back and B/L knee pain [de-identified] : persistent cellulitis R chest wall

## 2020-07-14 ENCOUNTER — APPOINTMENT (OUTPATIENT)
Dept: BREAST CENTER | Facility: CLINIC | Age: 75
End: 2020-07-14
Payer: MEDICAID

## 2020-07-14 DIAGNOSIS — Z79.810 LONG TERM (CURRENT) USE OF SELECTIVE ESTROGEN RECEPTOR MODULATORS (SERMS): ICD-10-CM

## 2020-07-14 DIAGNOSIS — C50.411 MALIGNANT NEOPLASM OF UPPER-OUTER QUADRANT OF RIGHT FEMALE BREAST: ICD-10-CM

## 2020-07-14 PROCEDURE — 99213 OFFICE O/P EST LOW 20 MIN: CPT

## 2020-07-14 NOTE — ASSESSMENT
[FreeTextEntry1] : ALDO is a sade 74 year old patient who presented today in follow up for a history of right breast cancer; status post bilateral mastectomy (September 2019).  \par She has been doing well with no new breast related complaints.  She continues to have intermittent chest wall pain, but this has not changed.\par Physical exam was unrevealing today.\par \par She will return for follow-up and clinical breast exam in six months.\par Referral for the center for [facility] was provided to the pt to establish care with\par a gynecologist.  It was explained that this is important with her taking Tamoxifen.\par \par I spent a total of 15 minutes of face to face time with this patient, greater than 50% of which was spent in counseling and/or coordination of care.\par All of her questions were appropriately answered.\par She knows to call with any concerns.

## 2020-07-14 NOTE — PHYSICAL EXAM
[Normocephalic] : normocephalic [Atraumatic] : atraumatic [No dominant masses] : no dominant masses in right breast  [No dominant masses] : no dominant masses left breast [No Axillary Lymphadenopathy] : no left axillary lymphadenopathy [No Rashes] : no rashes [No Ulceration] : no ulceration [de-identified] : well healed surgical scars.\par s/p bilateral mastectomy; no reconstruction.

## 2020-07-14 NOTE — REASON FOR VISIT
[Follow-Up: _____] : a [unfilled] follow-up visit [Family Member] : family member [FreeTextEntry1] : h/o right breast cancer; s/p B/L mastectomy (Sept 2019).

## 2020-07-14 NOTE — REVIEW OF SYSTEMS
[Joint Pain] : joint pain [Skin Lesions] : no skin lesions [Breast Pain] : breast pain [Breast Lump] : no breast lump [Negative] : Constitutional

## 2020-07-14 NOTE — HISTORY OF PRESENT ILLNESS
[FreeTextEntry1] : PCP: Glenroy Hutchins MD\par \par Patient with right breast invasive well to moderately differentiated ductal carcinoma on ultrasound guided core biopsy 6/12/19; 2:00 N8, 19 mm (cork).  \par Estrogen receptor positive, \par Progesterone receptor positive, \par HER2 negative, 1+\par Ki-67: 10-15%\par \par Right breast invasive well to moderately differentiated ductal carcinoma on ultrasound guided core biopsy 6/12/19; 2:00 N10, 10 mm (tophat).   \par Estrogen receptor positive, \par Progesterone receptor positive, \par HER2 negative, 1+\par Ki-67: 10-15%\par \par Left breast fibroadenoma on ultrasound guided core biopsy 6/12/19; 1:00 N5, (hourglass).  Findings are benign concordant and short term follow up left breast ultrasound recommended in six months.  \par \par No prior complaints related to the breasts. \par Her family history is significant for her sister with breast cancer at 68; her maternal aunt with breast cancer at 65; and her niece (sister's daughter) with breast cancer at 54.  \par \par Bilateral Breast MRI 7/26/19: 5 cm area of clumped non mass enhancement in the lateral aspect of the left breast; MR guided core biopsy recommended.  Per patient and her son, she prefers bilateral mastectomy and therefore biopsy was not indicated. \par \par Status post bilateral simple mastectomy and SLNB 9/18/19 demonstrating Left 0/1 (-); Left hyalinized fibroadenoma.  Right 0/1; Right breast two healing prior biopsy sites in the UIQ with invasive moderately differentiated ductal carcinoma, 22 mm associated with calcifications and DCIS, cribiform and micropapillary types, intermediate nuclear grade; with no definitive foci of LVI.  AJCC 8th Edition Pathologic Stage: pT2, p(sn)N0, pMx.\par \par Patient started antibiotics 4 days after discharge from hospital. \par \par Oncotype DX RS 6\par On Tamoxifen with Dr. Hartley.  \par \par Status post bilateral chest wall revision 2/19/20 demonstrating left benign skin and subcutaneous adipose tissue with dermal scar, 143 grams, and right benign skin and subcutaneous adipose tissue with small seborrheic keratosis, dermal scar, 274 grams.  \par \par Patient presents today for a evaluation.

## 2020-07-15 NOTE — ASU PREOP CHECKLIST - BP NONINVASIVE DIASTOLIC (MM HG)
Department of Anesthesiology  Postprocedure Note    Patient: Viktor Medrano  MRN: [id number]  YOB: 1954  Date of evaluation: 7/15/2020  Time:  1:27 PM     Procedure Summary     Date:  07/15/20 Room / Location:  [address] [place] [address]    Anesthesia Start:  4242 Anesthesia Stop:  0610    Procedure:  Right Breast Biopsy w/ needle placement (Right ) Diagnosis:  (right breast fibroadenoma)    Surgeon:  Moris Quintanilla MD Responsible Provider:  Jose Luis Gatica APRN - CRNA    Anesthesia Type:  general ASA Status:  3          Anesthesia Type: general    Jatinder Phase I: Jatinder Score: 10    Jatinder Phase II: Jatinder Score: 10    Last vitals: Reviewed and per EMR flowsheets.        Anesthesia Post Evaluation    Patient location during evaluation: bedside  Patient participation: complete - patient participated  Level of consciousness: sleepy but conscious  Pain score: 4  Airway patency: patent  Nausea & Vomiting: no nausea and no vomiting  Complications: no  Cardiovascular status: blood pressure returned to baseline and hemodynamically stable  Respiratory status: acceptable  Hydration status: euvolemic 75

## 2020-07-24 ENCOUNTER — OUTPATIENT (OUTPATIENT)
Dept: OUTPATIENT SERVICES | Facility: HOSPITAL | Age: 75
LOS: 1 days | Discharge: HOME | End: 2020-07-24
Payer: MEDICAID

## 2020-07-24 DIAGNOSIS — R10.9 UNSPECIFIED ABDOMINAL PAIN: ICD-10-CM

## 2020-07-24 DIAGNOSIS — Z98.890 OTHER SPECIFIED POSTPROCEDURAL STATES: Chronic | ICD-10-CM

## 2020-07-24 DIAGNOSIS — Z90.49 ACQUIRED ABSENCE OF OTHER SPECIFIED PARTS OF DIGESTIVE TRACT: Chronic | ICD-10-CM

## 2020-07-24 DIAGNOSIS — Z90.89 ACQUIRED ABSENCE OF OTHER ORGANS: Chronic | ICD-10-CM

## 2020-07-24 DIAGNOSIS — Z98.51 TUBAL LIGATION STATUS: Chronic | ICD-10-CM

## 2020-07-24 PROCEDURE — 74018 RADEX ABDOMEN 1 VIEW: CPT | Mod: 26

## 2020-08-04 ENCOUNTER — APPOINTMENT (OUTPATIENT)
Dept: GYNECOLOGIC ONCOLOGY | Facility: CLINIC | Age: 75
End: 2020-08-04

## 2020-08-31 NOTE — CHART NOTE - NSCHARTNOTEFT_GEN_A_CORE
1 dose of benadryl ordered for complaint of itching Addended by: Linda Arnett on: 8/31/2020 09:02 AM     Modules accepted: Orders

## 2020-11-16 ENCOUNTER — OUTPATIENT (OUTPATIENT)
Dept: OUTPATIENT SERVICES | Facility: HOSPITAL | Age: 75
LOS: 1 days | Discharge: HOME | End: 2020-11-16
Payer: MEDICAID

## 2020-11-16 VITALS
WEIGHT: 244.93 LBS | HEART RATE: 72 BPM | HEIGHT: 60 IN | SYSTOLIC BLOOD PRESSURE: 134 MMHG | DIASTOLIC BLOOD PRESSURE: 86 MMHG | RESPIRATION RATE: 16 BRPM | OXYGEN SATURATION: 97 % | TEMPERATURE: 98 F

## 2020-11-16 DIAGNOSIS — M20.5X2 OTHER DEFORMITIES OF TOE(S) (ACQUIRED), LEFT FOOT: ICD-10-CM

## 2020-11-16 DIAGNOSIS — Z98.890 OTHER SPECIFIED POSTPROCEDURAL STATES: Chronic | ICD-10-CM

## 2020-11-16 DIAGNOSIS — Z01.818 ENCOUNTER FOR OTHER PREPROCEDURAL EXAMINATION: ICD-10-CM

## 2020-11-16 DIAGNOSIS — M20.12 HALLUX VALGUS (ACQUIRED), LEFT FOOT: ICD-10-CM

## 2020-11-16 DIAGNOSIS — M20.42 OTHER HAMMER TOE(S) (ACQUIRED), LEFT FOOT: ICD-10-CM

## 2020-11-16 DIAGNOSIS — Z90.49 ACQUIRED ABSENCE OF OTHER SPECIFIED PARTS OF DIGESTIVE TRACT: Chronic | ICD-10-CM

## 2020-11-16 DIAGNOSIS — M20.41 OTHER HAMMER TOE(S) (ACQUIRED), RIGHT FOOT: ICD-10-CM

## 2020-11-16 DIAGNOSIS — Z90.89 ACQUIRED ABSENCE OF OTHER ORGANS: Chronic | ICD-10-CM

## 2020-11-16 DIAGNOSIS — Z90.10 ACQUIRED ABSENCE OF UNSPECIFIED BREAST AND NIPPLE: Chronic | ICD-10-CM

## 2020-11-16 DIAGNOSIS — Z98.51 TUBAL LIGATION STATUS: Chronic | ICD-10-CM

## 2020-11-16 LAB
APPEARANCE UR: CLEAR — SIGNIFICANT CHANGE UP
BILIRUB UR-MCNC: NEGATIVE — SIGNIFICANT CHANGE UP
COLOR SPEC: YELLOW — SIGNIFICANT CHANGE UP
DIFF PNL FLD: NEGATIVE — SIGNIFICANT CHANGE UP
GLUCOSE UR QL: NEGATIVE — SIGNIFICANT CHANGE UP
KETONES UR-MCNC: NEGATIVE — SIGNIFICANT CHANGE UP
LEUKOCYTE ESTERASE UR-ACNC: NEGATIVE — SIGNIFICANT CHANGE UP
NITRITE UR-MCNC: NEGATIVE — SIGNIFICANT CHANGE UP
PH UR: 6 — SIGNIFICANT CHANGE UP (ref 5–8)
PROT UR-MCNC: NEGATIVE — SIGNIFICANT CHANGE UP
SP GR SPEC: 1.02 — SIGNIFICANT CHANGE UP (ref 1.01–1.03)
UROBILINOGEN FLD QL: SIGNIFICANT CHANGE UP

## 2020-11-16 PROCEDURE — 93010 ELECTROCARDIOGRAM REPORT: CPT

## 2020-11-16 RX ORDER — ALBUTEROL 90 UG/1
2 AEROSOL, METERED ORAL
Qty: 0 | Refills: 0 | DISCHARGE

## 2020-11-16 RX ORDER — AMLODIPINE BESYLATE 2.5 MG/1
1 TABLET ORAL
Qty: 0 | Refills: 0 | DISCHARGE

## 2020-11-16 RX ORDER — MOMETASONE FUROATE AND FORMOTEROL FUMARATE DIHYDRATE 200; 5 UG/1; UG/1
2 AEROSOL RESPIRATORY (INHALATION)
Qty: 0 | Refills: 0 | DISCHARGE

## 2020-11-16 RX ORDER — TAMOXIFEN CITRATE 20 MG/1
1 TABLET, FILM COATED ORAL
Qty: 0 | Refills: 0 | DISCHARGE

## 2020-11-16 NOTE — H&P PST ADULT - NSICDXPASTMEDICALHX_GEN_ALL_CORE_FT
PAST MEDICAL HISTORY:  Anxiety     Arthritis OA    Asthma Remote- last use albuterol 2019? Well controlled    Bilateral breast cancer no chemo , no radRX    Blood clot in vein 2014 in [facility]    Cancer of breast     Depression no suicide ideation    HTN (hypertension)     Hypothyroid     Osteoarthritis      PAST MEDICAL HISTORY:  Anxiety     Arthritis OA    Asthma Remote- last use albuterol 2019? Well controlled    Bilateral breast cancer no chemo , no radRX    Blood clot in vein 2014 in [facility]    Cancer of breast     COPD (chronic obstructive pulmonary disease) not using meds    Depression no suicide ideation    HTN (hypertension)     Hypothyroid     Osteoarthritis

## 2020-11-16 NOTE — H&P PST ADULT - ADDITIONAL PE
deformities noted of 2 and 4th toes and 1st MTP bunion, RT foot 2nd hyperflexed. PEDAL OEDEMA NOTED rt >lt.

## 2020-11-16 NOTE — H&P PST ADULT - HISTORY OF PRESENT ILLNESS
75yr old female states has been having pain and difficulty walking because her toes are turned in both RT AND LT FEET -going on for many yrs -but has worsened over the last 1yr-presents to pretesting for First metatarsal osteotomy with internal fixation and arthoplasty of second and fourth toes with capsulotomy of second metatarsal phalangeal joint left foot plus arthoplasty of RT SECOND TOE. Denies COVID S/S. Denies sick contacts. Exercise mariola able to walk 2flat blocks slowly with cane- LTD by pain B/L knees.  Anesthesia Alert  NO--Difficult Airway  NO--History of neck surgery or radiation  NO--Limited ROM of neck  NO--History of Malignant hyperthermia  NO--Personal or family history of Pseudocholinesterase deficiency  NO--Prior Anesthesia Complication  NO--Latex Allergy  NO--Loose teeth  NO--History of Rheumatoid Arthritis  NO--MAGALIE  NO--Other_____

## 2020-11-16 NOTE — H&P PST ADULT - NSICDXPASTSURGICALHX_GEN_ALL_CORE_FT
PAST SURGICAL HISTORY:  H/O hernia repair may 25 2018    H/O tubal ligation     History of cholecystectomy     History of mastectomy B/L-1/2020    S/P tonsillectomy

## 2020-11-16 NOTE — H&P PST ADULT - HEART RATE (BEATS/MIN)
Message  Symbicort not covered by pharmacy  Will dc Symbicort and try Advair  U GABRIEL Gomez      Plan  Asthma    · Advair Diskus 250-50 MCG/DOSE Inhalation Aerosol Powder Breath Activated;  INHALE 1 PUFF TWICE DAILY   RINSE MOUTH AFTER USE  Chronic obstructive pulmonary disease    · Symbicort 80-4 5 MCG/ACT Inhalation Aerosol    Signatures   Electronically signed by : AJAY Gomez DO; Sep  6 2016  3:37PM EST                       (Author)
72

## 2020-11-17 ENCOUNTER — OUTPATIENT (OUTPATIENT)
Dept: OUTPATIENT SERVICES | Facility: HOSPITAL | Age: 75
LOS: 1 days | Discharge: HOME | End: 2020-11-17

## 2020-11-17 DIAGNOSIS — Z11.59 ENCOUNTER FOR SCREENING FOR OTHER VIRAL DISEASES: ICD-10-CM

## 2020-11-17 DIAGNOSIS — Z90.49 ACQUIRED ABSENCE OF OTHER SPECIFIED PARTS OF DIGESTIVE TRACT: Chronic | ICD-10-CM

## 2020-11-17 DIAGNOSIS — Z98.51 TUBAL LIGATION STATUS: Chronic | ICD-10-CM

## 2020-11-17 DIAGNOSIS — Z90.10 ACQUIRED ABSENCE OF UNSPECIFIED BREAST AND NIPPLE: Chronic | ICD-10-CM

## 2020-11-17 DIAGNOSIS — Z90.89 ACQUIRED ABSENCE OF OTHER ORGANS: Chronic | ICD-10-CM

## 2020-11-17 DIAGNOSIS — Z98.890 OTHER SPECIFIED POSTPROCEDURAL STATES: Chronic | ICD-10-CM

## 2020-11-17 PROBLEM — J44.9 CHRONIC OBSTRUCTIVE PULMONARY DISEASE, UNSPECIFIED: Chronic | Status: ACTIVE | Noted: 2020-11-16

## 2020-11-17 PROBLEM — J45.909 UNSPECIFIED ASTHMA, UNCOMPLICATED: Chronic | Status: ACTIVE | Noted: 2018-07-17

## 2020-11-17 PROBLEM — C50.911 MALIGNANT NEOPLASM OF UNSPECIFIED SITE OF RIGHT FEMALE BREAST: Chronic | Status: ACTIVE | Noted: 2019-08-17

## 2020-11-17 PROBLEM — I82.90 ACUTE EMBOLISM AND THROMBOSIS OF UNSPECIFIED VEIN: Chronic | Status: ACTIVE | Noted: 2018-05-27

## 2020-11-19 NOTE — ASU PATIENT PROFILE, ADULT - PSH
H/O hernia repair  may 25 2018  H/O tubal ligation    History of cholecystectomy    History of mastectomy  B/L-1/2020  S/P tonsillectomy

## 2020-11-19 NOTE — ASU PATIENT PROFILE, ADULT - PMH
Anxiety    Arthritis  OA  Asthma  Remote- last use albuterol 2019? Well controlled  Bilateral breast cancer  no chemo , no radRX  Blood clot in vein  2014 in [facility]  Cancer of breast    COPD (chronic obstructive pulmonary disease)  not using meds  Depression  no suicide ideation  HTN (hypertension)    Hypothyroid    Osteoarthritis

## 2020-11-20 ENCOUNTER — RESULT REVIEW (OUTPATIENT)
Age: 75
End: 2020-11-20

## 2020-11-20 ENCOUNTER — OUTPATIENT (OUTPATIENT)
Dept: OUTPATIENT SERVICES | Facility: HOSPITAL | Age: 75
LOS: 1 days | Discharge: HOME | End: 2020-11-20
Payer: MEDICAID

## 2020-11-20 VITALS
RESPIRATION RATE: 17 BRPM | HEART RATE: 99 BPM | DIASTOLIC BLOOD PRESSURE: 76 MMHG | WEIGHT: 244.05 LBS | TEMPERATURE: 98 F | SYSTOLIC BLOOD PRESSURE: 144 MMHG | OXYGEN SATURATION: 99 %

## 2020-11-20 VITALS — HEART RATE: 76 BPM | SYSTOLIC BLOOD PRESSURE: 141 MMHG | RESPIRATION RATE: 19 BRPM | DIASTOLIC BLOOD PRESSURE: 67 MMHG

## 2020-11-20 DIAGNOSIS — Z90.10 ACQUIRED ABSENCE OF UNSPECIFIED BREAST AND NIPPLE: Chronic | ICD-10-CM

## 2020-11-20 DIAGNOSIS — Z90.49 ACQUIRED ABSENCE OF OTHER SPECIFIED PARTS OF DIGESTIVE TRACT: Chronic | ICD-10-CM

## 2020-11-20 DIAGNOSIS — Z98.51 TUBAL LIGATION STATUS: Chronic | ICD-10-CM

## 2020-11-20 DIAGNOSIS — Z90.89 ACQUIRED ABSENCE OF OTHER ORGANS: Chronic | ICD-10-CM

## 2020-11-20 DIAGNOSIS — Z98.890 OTHER SPECIFIED POSTPROCEDURAL STATES: Chronic | ICD-10-CM

## 2020-11-20 PROCEDURE — 88304 TISSUE EXAM BY PATHOLOGIST: CPT | Mod: 26

## 2020-11-20 PROCEDURE — 88311 DECALCIFY TISSUE: CPT | Mod: 26

## 2020-11-20 RX ORDER — ONDANSETRON 8 MG/1
4 TABLET, FILM COATED ORAL ONCE
Refills: 0 | Status: DISCONTINUED | OUTPATIENT
Start: 2020-11-20 | End: 2020-12-05

## 2020-11-20 RX ORDER — MEPERIDINE HYDROCHLORIDE 50 MG/ML
12.5 INJECTION INTRAMUSCULAR; INTRAVENOUS; SUBCUTANEOUS ONCE
Refills: 0 | Status: DISCONTINUED | OUTPATIENT
Start: 2020-11-20 | End: 2020-11-20

## 2020-11-20 RX ORDER — SODIUM CHLORIDE 9 MG/ML
1000 INJECTION, SOLUTION INTRAVENOUS
Refills: 0 | Status: DISCONTINUED | OUTPATIENT
Start: 2020-11-20 | End: 2020-12-05

## 2020-11-20 RX ORDER — MORPHINE SULFATE 50 MG/1
4 CAPSULE, EXTENDED RELEASE ORAL
Refills: 0 | Status: DISCONTINUED | OUTPATIENT
Start: 2020-11-20 | End: 2020-11-20

## 2020-11-20 RX ORDER — MORPHINE SULFATE 50 MG/1
2 CAPSULE, EXTENDED RELEASE ORAL
Refills: 0 | Status: DISCONTINUED | OUTPATIENT
Start: 2020-11-20 | End: 2020-11-20

## 2020-11-20 RX ORDER — OXYCODONE AND ACETAMINOPHEN 5; 325 MG/1; MG/1
1 TABLET ORAL ONCE
Refills: 0 | Status: DISCONTINUED | OUTPATIENT
Start: 2020-11-20 | End: 2020-11-20

## 2020-11-20 RX ADMIN — SODIUM CHLORIDE 100 MILLILITER(S): 9 INJECTION, SOLUTION INTRAVENOUS at 16:45

## 2020-11-20 NOTE — ASU DISCHARGE PLAN (ADULT/PEDIATRIC) - CALL YOUR DOCTOR IF YOU HAVE ANY OF THE FOLLOWING:
Swelling that gets worse/Unable to urinate/Wound/Surgical Site with redness, or foul smelling discharge or pus/Numbness, tingling, color or temperature change to extremity/Pain not relieved by Medications/Nausea and vomiting that does not stop/Fever greater than (need to indicate Fahrenheit or Celsius)/Bleeding that does not stop

## 2020-11-20 NOTE — ASU DISCHARGE PLAN (ADULT/PEDIATRIC) - CARE PROVIDER_API CALL
Nghia Roy  SURGERY  [address] [place] [address]  Phone: [phone]  Fax: [phone]  Follow Up Time:

## 2020-11-20 NOTE — BRIEF OPERATIVE NOTE - NSICDXBRIEFPROCEDURE_GEN_ALL_CORE_FT
PROCEDURES:  Tenotomy of foot 20-Nov-2020 16:17:30 4th left foot Stefan Rueda  Capsulotomy, digit 20-Nov-2020 16:17:08 2nd met head left foot Stefan Rueda  Arthroplasty, toe 20-Nov-2020 16:16:37 2nd right and 2nd left Stefan Rueda  Gonzales bunionectomy 20-Nov-2020 16:16:21 left foot Stefan Rueda

## 2020-11-20 NOTE — BRIEF OPERATIVE NOTE - NSICDXBRIEFPREOP_GEN_ALL_CORE_FT
PRE-OP DIAGNOSIS:  Hammertoe, bilateral 20-Nov-2020 16:18:07 2nd digit b/l and 4th digit left foot Stefan Rueda  Hav (hallux abducto valgus), right 20-Nov-2020 16:17:56  Stefan Rueda

## 2020-11-20 NOTE — ASU DISCHARGE PLAN (ADULT/PEDIATRIC) - ASU DC SPECIAL INSTRUCTIONSFT
pt to follow up on monday 11/23/2020  weight bearing as tolerated bilateral feet with DARCO  keep dressing clean dry intact  rest ice compression and elevation  take pain medications as directed

## 2020-11-20 NOTE — BRIEF OPERATIVE NOTE - NSICDXBRIEFPOSTOP_GEN_ALL_CORE_FT
POST-OP DIAGNOSIS:  Merrick, bilateral 20-Nov-2020 16:19:26  Stefan Rueda  Hallux abductovalgus, right 20-Nov-2020 16:19:17  Stefan Rueda

## 2020-11-20 NOTE — PRE-ANESTHESIA EVALUATION ADULT - NSANTHAPLANRD_GEN_ALL_CORE
Upon Nutritional Assessment by the Registered Dietitian your patient was determined to meet criteria / has evidence of the following diagnosis/diagnoses:          [ ]  Mild Protein Calorie Malnutrition        [ ]  Moderate Protein Calorie Malnutrition        [x ] Severe Protein Calorie Malnutrition        [ ] Unspecified Protein Calorie Malnutrition        [ ] Underweight / BMI <19        [ ] Morbid Obesity / BMI > 40      Findings as based on:  •  Comprehensive nutrition assessment and consultation  •  Calorie counts (nutrient intake analysis)  •  Food acceptance and intake status from observations by staff  •  Follow up  •  Patient education  •  Intervention secondary to interdisciplinary rounds  •   concerns      Treatment:    The following diet has been recommended:    Ensure TID     PROVIDER Section:     By signing this assessment you are acknowledging and agree with the diagnosis/diagnoses assigned by the Registered Dietitian    Comments: general/monitored anesthesia care (MAC)

## 2020-11-25 DIAGNOSIS — M21.612 BUNION OF LEFT FOOT: ICD-10-CM

## 2020-11-25 DIAGNOSIS — Z88.0 ALLERGY STATUS TO PENICILLIN: ICD-10-CM

## 2020-11-25 DIAGNOSIS — I10 ESSENTIAL (PRIMARY) HYPERTENSION: ICD-10-CM

## 2020-11-25 DIAGNOSIS — M20.42 OTHER HAMMER TOE(S) (ACQUIRED), LEFT FOOT: ICD-10-CM

## 2020-11-25 DIAGNOSIS — E03.9 HYPOTHYROIDISM, UNSPECIFIED: ICD-10-CM

## 2020-11-25 DIAGNOSIS — J45.909 UNSPECIFIED ASTHMA, UNCOMPLICATED: ICD-10-CM

## 2020-11-27 LAB — SURGICAL PATHOLOGY STUDY: SIGNIFICANT CHANGE UP

## 2021-01-06 ENCOUNTER — APPOINTMENT (OUTPATIENT)
Dept: BREAST CENTER | Facility: CLINIC | Age: 76
End: 2021-01-06
Payer: MEDICAID

## 2021-01-06 PROCEDURE — 99072 ADDL SUPL MATRL&STAF TM PHE: CPT

## 2021-01-06 PROCEDURE — 99213 OFFICE O/P EST LOW 20 MIN: CPT

## 2021-01-06 NOTE — REVIEW OF SYSTEMS
[Joint Pain] : joint pain [Breast Pain] : breast pain [Negative] : Constitutional [Skin Lesions] : no skin lesions [Breast Lump] : no breast lump

## 2021-01-06 NOTE — ASSESSMENT
[FreeTextEntry1] : ALDO is a sade 75 year old patient who presented today in follow up for a history of right breast cancer; status post bilateral mastectomy (September 2019).  \par She has been doing well with no new breast related complaints.  She continues to have intermittent chest wall pain, but this has not changed.\par Physical exam was unrevealing today.\par \par She will return for follow-up and clinical breast exam in six months.\par It was explained to the patient that she should establish care with\par a gynecologist - this is important with her taking Tamoxifen.\par She will continue follow-up with her medical oncologist.\par \par I spent a total of 15 minutes of face to face time with this patient, greater than 50% of which was spent in counseling and/or coordination of care.\par All of her questions were appropriately answered.\par She knows to call with any concerns.

## 2021-01-11 ENCOUNTER — APPOINTMENT (OUTPATIENT)
Dept: HEMATOLOGY ONCOLOGY | Facility: CLINIC | Age: 76
End: 2021-01-11

## 2021-01-11 DIAGNOSIS — K13.79 OTHER LESIONS OF ORAL MUCOSA: ICD-10-CM

## 2021-01-19 NOTE — ASU PREOP CHECKLIST - BP NONINVASIVE SYSTOLIC (MM HG)
Patient called pre-service center [facility]) Reshma with red flag complaint. Brief description of triage: Palpitations     Triage indicates for patient to ED    Care advice provided, patient verbalizes understanding; denies any other questions or concerns; instructed to call back for any new or worsening symptoms. Reason for Disposition   Dizziness, lightheadedness, or weakness    Answer Assessment - Initial Assessment Questions  1. DESCRIPTION: \"Please describe your heart rate or heart beat that you are having\" (e.g., fast/slow, regular/irregular, skipped or extra beats, \"palpitations\")  Feels like heart is \"pounding out of his chest\"; faster heart rate (123 now)     2. ONSET: \"When did it start? \" (Minutes, hours or days)   A couple weeks ago     3. DURATION: \"How long does it last\" (e.g., seconds, minutes, hours)  Worse when exercising; mostly constant      4. PATTERN \"Does it come and go, or has it been constant since it started? \"  \"Does it get worse with exertion? \"   \"Are you feeling it now? \"  Mostly constant     5. TAP: \"Using your hand, can you tap out what you are feeling on a chair or table in front of you, so that I can hear? \" (Note: not all patients can do this)    Unsure    6. HEART RATE: \"Can you tell me your heart rate? \" \"How many beats in 15 seconds? \"  (Note: not all patients can do this)    123    7. RECURRENT SYMPTOM: \"Have you ever had this before? \" If so, ask: \"When was the last time? \" and \"What happened that time? \"   History of tachycardia in past        8. CAUSE: \"What do you think is causing the palpitations? \"  Unsure    9. CARDIAC HISTORY: \"Do you have any history of heart disease? \" (e.g., heart attack, angina, bypass surgery, angioplasty, arrhythmia)   Denies, hx of ST    10. OTHER SYMPTOMS: \"Do you have any other symptoms? \" (e.g., dizziness, chest pain, sweating, difficulty breathing)  Sweaty palms, chest pain earlier today while skateboarding (1-2 minutes today on left chest), headache, soreness in calves, tightening in hands    Protocols used: Oseas Villareal 3. QUESTIONS-ADULT-OH      Attention Provider: Thank you for allowing me to participate in the care of your patient. The patient was connected to triage in response to information provided to the ECC. Please do not respond through this encounter as the response is not directed to a shared pool. 131

## 2021-06-15 ENCOUNTER — APPOINTMENT (OUTPATIENT)
Dept: HEMATOLOGY ONCOLOGY | Facility: CLINIC | Age: 76
End: 2021-06-15

## 2021-06-28 NOTE — ASU PREOP CHECKLIST - ISOLATION PRECAUTIONS
none Cimetidine Pregnancy And Lactation Text: This medication is Pregnancy Category B and is considered safe during pregnancy. It is also excreted in breast milk and breast feeding isn't recommended.

## 2021-07-19 ENCOUNTER — NON-APPOINTMENT (OUTPATIENT)
Age: 76
End: 2021-07-19

## 2021-07-19 ENCOUNTER — APPOINTMENT (OUTPATIENT)
Dept: ORTHOPEDIC SURGERY | Facility: CLINIC | Age: 76
End: 2021-07-19
Payer: MEDICAID

## 2021-07-19 PROCEDURE — 99204 OFFICE O/P NEW MOD 45 MIN: CPT

## 2021-07-19 PROCEDURE — 73562 X-RAY EXAM OF KNEE 3: CPT | Mod: 26

## 2021-07-19 NOTE — HISTORY OF PRESENT ILLNESS
[de-identified] : 75 year old female presents to the office today complaining of bilateral knee pain for more than one year. Patient reports pain that is sharp and achy in nature. There is swelling, buckling, and a loss of motion. Patient reports pain with just a few feet of ambulation. There is pain and difficulty with negotiating stairs. She reports taking Tylenol for pain with only minimal relief. She reports having cortisone injections into her bilateral knees about one month ago with only minimal relief. Patient is here today to discuss her next options for pain relief.

## 2021-07-19 NOTE — ASSESSMENT
[FreeTextEntry1] : 75 year old female presents to the office for bilateral knee pain for 1 year. Her PCP asked us to see her. He provided us with a note stating that she was not a good candidate for Sx at this time due to her obesity. He would like for us to treat her conservatively and recommended gel injections. She does have advanced arthritis of both knees and i am in total agreement that weight loss prior to Sx would be advisable. We will order the gel injections and start her on those once we have the product.

## 2021-07-19 NOTE — PHYSICAL EXAM
[de-identified] : General appearance: well nourished and hydrated, pleasant, alert and oriented x 3, cooperative.\par Cardiovascular: no apparent abnormalities, no lower leg edema, no varicosities, pedal pulses are palpable.\par Neurologic: sensation is normal, no muscle weakness in upper or lower extremities\par Dermatologic no apparent skin lesions, moist, warm, no rash.\par Gait: nonantalgic.\par \par Left knee\par Inspection: no effusion or erythema.\par Wounds: none.\par Alignment: normal.\par Palpation: lateral joint line tenderness \par ROM:15 degree flexion contracture, flexes  degrees \par Ligamentous laxity: all ligaments appear stable\par Muscle Test: good quad strength.\par \par Right knee\par Inspection: no effusion or erythema.\par Wounds: none.\par Alignment: normal \par Palpation: lateral joint line tenderness \par ROM: 0-110 degrees\par Ligamentous laxity: all ligaments appear stable\par Muscle Test: good quad strength.\par \par Left hip\par Inspection: No swelling or ecchymosis.\par Wounds: none.\par Palpation: non-tender.\par Stability: no instability.\par Strength: 5/5 all motor groups.\par ROM: no pain with FROM.\par Leg length: equal.\par \par Right hip\par Inspection: No swelling or ecchymosis.\par Wounds: none.\par Palpation: non-tender.\par Stability: no instability.\par Strength: 5/5 all motor groups.\par ROM: no pain with FROM.\par Leg length: equal.\par \par   [de-identified] : Radiographs done today AP lateral and skyline of knees shows bone on bone in the PF joints bilaterally , right knee has 50 percent medial joint space narrowing and the left has 50 percent lateral joint space narrowing.

## 2021-08-03 ENCOUNTER — APPOINTMENT (OUTPATIENT)
Dept: BREAST CENTER | Facility: CLINIC | Age: 76
End: 2021-08-03
Payer: MEDICAID

## 2021-08-03 VITALS
DIASTOLIC BLOOD PRESSURE: 78 MMHG | SYSTOLIC BLOOD PRESSURE: 132 MMHG | WEIGHT: 240 LBS | TEMPERATURE: 98.7 F | HEIGHT: 65 IN | BODY MASS INDEX: 39.99 KG/M2

## 2021-08-03 PROCEDURE — 99212 OFFICE O/P EST SF 10 MIN: CPT

## 2021-08-03 NOTE — REVIEW OF SYSTEMS
[Negative] : Heme/Lymph [de-identified] : right food cellulitis treated with antibiotics currently

## 2021-08-03 NOTE — ASSESSMENT
[FreeTextEntry1] : ALDO is a sade 75 year old patient who presented today in follow up for a history of right breast cancer; status post bilateral mastectomy (September 2019).  \par She has been doing well with no new breast related complaints.  \par \par \par \par PLAN: She will return for follow-up and clinical breast exam in six months.\par Patient will follow up with Dr Hartley on 09/01/21\par The patient was informed that Dr Geno Candelario will no longer be practicing here as of the end of August 2021.; her care will be continued with the practice\par \par \par I spent a total of 15 minutes of face to face time with this patient, greater than 50% of which was spent in counseling and/or coordination of care.\par All of her questions were appropriately answered.\par She knows to call with any concerns.

## 2021-08-03 NOTE — PHYSICAL EXAM
[Normocephalic] : normocephalic [EOMI] : extra ocular movement intact [Examined in the supine and seated position] : examined in the supine and seated position [No Axillary Lymphadenopathy] : no left axillary lymphadenopathy [No Edema] : no edema [No Rashes] : no rashes [No Ulceration] : no ulceration

## 2021-08-03 NOTE — HISTORY OF PRESENT ILLNESS
[FreeTextEntry1] : PCP: Glenroy Hutchins MD\par \par Patient with right breast invasive well to moderately differentiated ductal carcinoma on ultrasound guided core biopsy 6/12/19; 2:00 N8, 19 mm (cork).  \par Estrogen receptor positive, \par Progesterone receptor positive, \par HER2 negative, 1+\par Ki-67: 10-15%\par \par Right breast invasive well to moderately differentiated ductal carcinoma on ultrasound guided core biopsy 6/12/19; 2:00 N10, 10 mm (tophat).   \par Estrogen receptor positive, \par Progesterone receptor positive, \par HER2 negative, 1+\par Ki-67: 10-15%\par \par Left breast fibroadenoma on ultrasound guided core biopsy 6/12/19; 1:00 N5, (hourglass).  Findings are benign concordant and short term follow up left breast ultrasound recommended in six months.  \par \par No prior complaints related to the breasts. \par Her family history is significant for her sister with breast cancer at 68; her maternal aunt with breast cancer at 65; and her niece (sister's daughter) with breast cancer at 54.  \par \par Bilateral Breast MRI 7/26/19: 5 cm area of clumped non mass enhancement in the lateral aspect of the left breast; MR guided core biopsy recommended.  Per patient and her son, she prefers bilateral mastectomy and therefore biopsy was not indicated. \par \par Status post bilateral simple mastectomy and SLNB 9/18/19 demonstrating Left 0/1 (-); Left hyalinized fibroadenoma.  Right 0/1; Right breast two healing prior biopsy sites in the UIQ with invasive moderately differentiated ductal carcinoma, 22 mm associated with calcifications and DCIS, cribiform and micropapillary types, intermediate nuclear grade; with no definitive foci of LVI.  AJCC 8th Edition Pathologic Stage: pT2, p(sn)N0, pMx.\par \par Patient started antibiotics 4 days after discharge from hospital. \par \par Oncotype DX RS 6\par On Tamoxifen with Dr. Hartley.  \par \par Status post bilateral chest wall revision 2/19/20\par Patient presents today for clinical exam

## 2021-09-01 ENCOUNTER — APPOINTMENT (OUTPATIENT)
Dept: HEMATOLOGY ONCOLOGY | Facility: CLINIC | Age: 76
End: 2021-09-01
Payer: MEDICAID

## 2021-09-01 ENCOUNTER — OUTPATIENT (OUTPATIENT)
Dept: OUTPATIENT SERVICES | Facility: HOSPITAL | Age: 76
LOS: 1 days | Discharge: HOME | End: 2021-09-01

## 2021-09-01 VITALS
BODY MASS INDEX: 40.98 KG/M2 | SYSTOLIC BLOOD PRESSURE: 141 MMHG | WEIGHT: 246 LBS | TEMPERATURE: 98.3 F | HEIGHT: 65 IN | DIASTOLIC BLOOD PRESSURE: 74 MMHG | HEART RATE: 79 BPM

## 2021-09-01 DIAGNOSIS — Z90.89 ACQUIRED ABSENCE OF OTHER ORGANS: Chronic | ICD-10-CM

## 2021-09-01 DIAGNOSIS — Z98.51 TUBAL LIGATION STATUS: Chronic | ICD-10-CM

## 2021-09-01 DIAGNOSIS — Z98.890 OTHER SPECIFIED POSTPROCEDURAL STATES: Chronic | ICD-10-CM

## 2021-09-01 DIAGNOSIS — Z90.10 ACQUIRED ABSENCE OF UNSPECIFIED BREAST AND NIPPLE: Chronic | ICD-10-CM

## 2021-09-01 DIAGNOSIS — Z90.49 ACQUIRED ABSENCE OF OTHER SPECIFIED PARTS OF DIGESTIVE TRACT: Chronic | ICD-10-CM

## 2021-09-01 PROCEDURE — 99214 OFFICE O/P EST MOD 30 MIN: CPT

## 2021-09-05 NOTE — REASON FOR VISIT
[Follow-Up Visit] : a follow-up [Family Member] : family member [Patient Declined  Services] : - None: Patient declined  services [FreeTextEntry2] : Breast cancer [FreeTextEntry3] : Preferred daughter to translate.

## 2021-09-05 NOTE — REVIEW OF SYSTEMS
[Negative] : Allergic/Immunologic [de-identified] : right leg cellulitis recently and was treated with antibiotics

## 2021-09-05 NOTE — HISTORY OF PRESENT ILLNESS
[de-identified] : 74 yof, PMH of asthma, HTN, hypothyroidism, severe osteoarthritis (receives cortisone injections) presents for initial evaluation of R-sided breast cancer. \par \par  Patient palpated a mass in her right breast and underwent ultrasound-guided core biopsy, which showed invasive ductal carcinoma well to moderately differentiated ER/MO positive %, HER2 negative, Ki-67 10-15%.  B/L breast MRI revealed 5 cm non-mass enhancement lateral left breast.  No biopsy was done secondary to patient preference to undergo B/L mastectomy and sentinel lymph node biopsy.  Pathology from B/L mastectomy and SLNB revealed left breast fibroadenoma, right breast invasive ductal carcinoma, moderately differentiated, 22 mm, a/w calcifications and DCIS, no LVI, negative margins. \par \par  Pathology pT2p(sn)N0pMx (stage II A).  ER >90%, MO >90%, Ki-67 10-15%.  Oncotype dx score is 6.  Patient now presents with daughter regarding initiation of antihormonal therapy.\par \par Patient experienced menarche at age 11 and menopause at age 49.  She is  and has never .  She took OCPs for about one year in late 20s.  Never took hormone replacement therapy.\par \par  [de-identified] : 2/6/20\par  Patient here for follow up, feeling well.  She denies any new complaints.  Patient denies any new palpable breast lumps or pain, denies skin changes, denies nipple discharge.  Patient denies cough, shortness of breath, denies fever, denies bone pain.\par She started taking Tamoxifen since Nov 2019, tolerating it well.\par Denies hot flashes, sweating.\par Pt is going for b/l axillary fat excision.\par \par 9/1/2021\par Patient is here to follow up for breast cancer, accompanied by son Prashanth who is also interpreting for the patient since patient refused to use the \par She is compliant with Tamoxifen since 11/2019, tolerating well\par She denies any new chest wall mass/pain, no vaginal bleeding, hot flushes or mood swings\par She is concerned that her hair falls in the morning\par She has not seen gyne and GI for colonoscopy\par She f/u with PCP Dr Glenroy Hutchins

## 2021-09-05 NOTE — ASSESSMENT
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 s/p bilateral mastectomy, presents to discuss treatment with antihormonal therapy.\par \par RECOMMENDATION:\par Previous notes reviewed and all relevant radiology results discussed with Dr Hartley and communicated to the patient and his son, Prashanth.\par \par #Stage IIA HR positive HER2 negative breast cancer\par -- Continue Tamoxifen 20 mg daily - eRx sent\par Side effects of Tamoxifen including hot flashes, menstrual irregularities, weight gain, mood changes, DVT, cataracts and uterine cancer were discussed.\par Compliance assessed on today's visit.\par -- Emphasized to follow up with gyne - Women's health number provided\par -- Follow up with GI for colonoscopy\par -- Healthy lifestyle discussed\par -- Continue to follow up with PCP as recommended\par \par All her concerns were addressed during the visit\par \par RTC in 6 months\par \par Case was seen and discussed with Dr. Hartley who agreed with assessment and plan.\par \par \par \par \par

## 2021-09-05 NOTE — PHYSICAL EXAM
[Fully active, able to carry on all pre-disease performance without restriction] : Status 0 - Fully active, able to carry on all pre-disease performance without restriction [Obese] : obese [Normal] : PERRL, EOMI, no conjunctival infection, anicteric [de-identified] : s/p bilateral mastectomy; no lymphadenopathy noted bilaterally [de-identified] : obese [de-identified] : skin on right leg taut; no warmth or redness [de-identified] : Ambulates with cane

## 2021-09-07 DIAGNOSIS — C50.411 MALIGNANT NEOPLASM OF UPPER-OUTER QUADRANT OF RIGHT FEMALE BREAST: ICD-10-CM

## 2021-09-07 DIAGNOSIS — Z79.810 LONG TERM (CURRENT) USE OF SELECTIVE ESTROGEN RECEPTOR MODULATORS (SERMS): ICD-10-CM

## 2021-09-08 NOTE — ED PROVIDER NOTE - PMH
Arthritis  OA  Asthma  no recent attacks  Blood clot in vein  5 y ago with treatment  Chronic obstructive pulmonary disease, unspecified COPD type    Depression  no suicide ideation  HTN (hypertension)    Hypothyroid    Osteoarthritis
Yes...

## 2021-10-19 ENCOUNTER — APPOINTMENT (OUTPATIENT)
Dept: BURN CARE | Facility: CLINIC | Age: 76
End: 2021-10-19
Payer: MEDICAID

## 2021-10-19 ENCOUNTER — OUTPATIENT (OUTPATIENT)
Dept: OUTPATIENT SERVICES | Facility: HOSPITAL | Age: 76
LOS: 1 days | Discharge: HOME | End: 2021-10-19

## 2021-10-19 DIAGNOSIS — Z90.49 ACQUIRED ABSENCE OF OTHER SPECIFIED PARTS OF DIGESTIVE TRACT: Chronic | ICD-10-CM

## 2021-10-19 DIAGNOSIS — Z90.10 ACQUIRED ABSENCE OF UNSPECIFIED BREAST AND NIPPLE: Chronic | ICD-10-CM

## 2021-10-19 DIAGNOSIS — Z98.51 TUBAL LIGATION STATUS: Chronic | ICD-10-CM

## 2021-10-19 DIAGNOSIS — Z90.89 ACQUIRED ABSENCE OF OTHER ORGANS: Chronic | ICD-10-CM

## 2021-10-19 DIAGNOSIS — Z98.890 OTHER SPECIFIED POSTPROCEDURAL STATES: Chronic | ICD-10-CM

## 2021-10-19 PROCEDURE — 99202 OFFICE O/P NEW SF 15 MIN: CPT

## 2021-11-09 ENCOUNTER — APPOINTMENT (OUTPATIENT)
Dept: BURN CARE | Facility: CLINIC | Age: 76
End: 2021-11-09

## 2021-11-19 ENCOUNTER — APPOINTMENT (OUTPATIENT)
Dept: ORTHOPEDIC SURGERY | Facility: CLINIC | Age: 76
End: 2021-11-19
Payer: MEDICAID

## 2021-11-19 PROCEDURE — 99213 OFFICE O/P EST LOW 20 MIN: CPT

## 2021-11-19 NOTE — HISTORY OF PRESENT ILLNESS
[de-identified] : 76 year old female presents to the office for Euflexxa injections to her bilateral arthritic and painful knees.

## 2021-11-19 NOTE — ASSESSMENT
[FreeTextEntry1] : Patient presents to the office for bilateral Euflexxa injections to both her arthritic knees. The pt left lower leg was wrapped with an ace bandage. She is under the care of Dr Mantilla for an open wound. I told the patient we would need to wait until the wound is completely healed before proceeding with gel injections. We therefore did not do the gel injections today. They have an appt with Dr Mantilla on the 23rd of November. The son will call us  when the wound is resolved.

## 2021-11-19 NOTE — HISTORY OF PRESENT ILLNESS
[de-identified] : 76 year old female presents to the office for Euflexxa injections to her bilateral arthritic and painful knees.

## 2021-11-23 ENCOUNTER — OUTPATIENT (OUTPATIENT)
Dept: OUTPATIENT SERVICES | Facility: HOSPITAL | Age: 76
LOS: 1 days | Discharge: HOME | End: 2021-11-23

## 2021-11-23 ENCOUNTER — APPOINTMENT (OUTPATIENT)
Dept: BURN CARE | Facility: CLINIC | Age: 76
End: 2021-11-23
Payer: MEDICAID

## 2021-11-23 DIAGNOSIS — Z90.89 ACQUIRED ABSENCE OF OTHER ORGANS: Chronic | ICD-10-CM

## 2021-11-23 DIAGNOSIS — Z90.10 ACQUIRED ABSENCE OF UNSPECIFIED BREAST AND NIPPLE: Chronic | ICD-10-CM

## 2021-11-23 DIAGNOSIS — Z98.890 OTHER SPECIFIED POSTPROCEDURAL STATES: Chronic | ICD-10-CM

## 2021-11-23 DIAGNOSIS — Z90.49 ACQUIRED ABSENCE OF OTHER SPECIFIED PARTS OF DIGESTIVE TRACT: Chronic | ICD-10-CM

## 2021-11-23 DIAGNOSIS — Z98.51 TUBAL LIGATION STATUS: Chronic | ICD-10-CM

## 2021-11-23 PROCEDURE — 99213 OFFICE O/P EST LOW 20 MIN: CPT

## 2021-11-24 ENCOUNTER — APPOINTMENT (OUTPATIENT)
Dept: ORTHOPEDIC SURGERY | Facility: CLINIC | Age: 76
End: 2021-11-24

## 2021-11-26 ENCOUNTER — APPOINTMENT (OUTPATIENT)
Dept: OBGYN | Facility: CLINIC | Age: 76
End: 2021-11-26

## 2021-12-03 ENCOUNTER — APPOINTMENT (OUTPATIENT)
Dept: ORTHOPEDIC SURGERY | Facility: CLINIC | Age: 76
End: 2021-12-03

## 2021-12-07 ENCOUNTER — OUTPATIENT (OUTPATIENT)
Dept: OUTPATIENT SERVICES | Facility: HOSPITAL | Age: 76
LOS: 1 days | Discharge: HOME | End: 2021-12-07

## 2021-12-07 ENCOUNTER — APPOINTMENT (OUTPATIENT)
Dept: BURN CARE | Facility: CLINIC | Age: 76
End: 2021-12-07
Payer: MEDICAID

## 2021-12-07 DIAGNOSIS — Z90.89 ACQUIRED ABSENCE OF OTHER ORGANS: Chronic | ICD-10-CM

## 2021-12-07 DIAGNOSIS — S81.801A UNSPECIFIED OPEN WOUND, RIGHT LOWER LEG, INITIAL ENCOUNTER: ICD-10-CM

## 2021-12-07 DIAGNOSIS — Z98.51 TUBAL LIGATION STATUS: Chronic | ICD-10-CM

## 2021-12-07 DIAGNOSIS — Z90.10 ACQUIRED ABSENCE OF UNSPECIFIED BREAST AND NIPPLE: Chronic | ICD-10-CM

## 2021-12-07 DIAGNOSIS — Z90.49 ACQUIRED ABSENCE OF OTHER SPECIFIED PARTS OF DIGESTIVE TRACT: Chronic | ICD-10-CM
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
## 2021-12-07 DIAGNOSIS — Z98.890 OTHER SPECIFIED POSTPROCEDURAL STATES: Chronic | ICD-10-CM

## 2021-12-07 PROCEDURE — 99213 OFFICE O/P EST LOW 20 MIN: CPT

## 2021-12-23 ENCOUNTER — APPOINTMENT (OUTPATIENT)
Dept: OBGYN | Facility: CLINIC | Age: 76
End: 2021-12-23

## 2022-01-01 NOTE — ED PROVIDER NOTE - DISPOSITION TYPE
VSS, NAD noted. Formula and breastfeeding; mother encouraged to feed 8 or more times in 24 hours, and on cue. Tolerating feedings. Voiding and stooling spontaneously. Reviewed plan of care with mother. Mother stated verbal understanding.     ADMIT

## 2022-01-04 NOTE — H&P PST ADULT - CONSTITUTIONAL
Please let pt know kidney function has improved some. See if he has any LE edema since cutting dose of lasix and check on BP, weights.      Thanks,   Radha    12/28- Writer spoke to Jimmy by phone. He reports no swelling since his decrease in lasix dose and states he is feeling well. He had already seen his kidney labs and was happy to see his function had a slight improvement.  Writer asked him about his weights and BP. He reports he has not been checking blood pressures but did check his weight this a.m.- it was 162.8 pounds. He thinks his weight yesterday was 161 pounds. He thinks the increase is related to his prednisone. Writer informed him that it could also be from edema as well and encouraged him to weigh himself daily at the same time, preferably after urinating in a.m. and recording that. Writer also encouraged him to check BP daily after his weight checks. Jimmy stated he would do that and send a [employer] message to provider towards end of week. This writer will check on him at end of week if they have not heard from him through [employer]. Writer will also route msg to  to get him scheduled for end of January with Dr. Parmar.  He prefers a phone visit at that time.    12/31- Left VM to check on BP and weights.  
Spoke with Jimmy. He states he is outside right now and doesn't have his BP or weights with him but when he goes inside he will send that information through [employer] for writer.   
detailed exam

## 2022-01-05 RX ORDER — SILVER SULFADIAZINE 10 MG/G
1 CREAM TOPICAL TWICE DAILY
Qty: 1 | Refills: 1 | Status: ACTIVE | COMMUNITY
Start: 2022-01-05 | End: 1900-01-01

## 2022-01-13 ENCOUNTER — OUTPATIENT (OUTPATIENT)
Dept: OUTPATIENT SERVICES | Facility: HOSPITAL | Age: 77
LOS: 1 days | Discharge: HOME | End: 2022-01-13

## 2022-01-13 ENCOUNTER — APPOINTMENT (OUTPATIENT)
Dept: BURN CARE | Facility: CLINIC | Age: 77
End: 2022-01-13
Payer: MEDICAID

## 2022-01-13 DIAGNOSIS — Z98.890 OTHER SPECIFIED POSTPROCEDURAL STATES: Chronic | ICD-10-CM

## 2022-01-13 DIAGNOSIS — Z90.89 ACQUIRED ABSENCE OF OTHER ORGANS: Chronic | ICD-10-CM

## 2022-01-13 DIAGNOSIS — Z90.49 ACQUIRED ABSENCE OF OTHER SPECIFIED PARTS OF DIGESTIVE TRACT: Chronic | ICD-10-CM

## 2022-01-13 DIAGNOSIS — Z98.51 TUBAL LIGATION STATUS: Chronic | ICD-10-CM

## 2022-01-13 DIAGNOSIS — Z90.10 ACQUIRED ABSENCE OF UNSPECIFIED BREAST AND NIPPLE: Chronic | ICD-10-CM

## 2022-01-13 PROCEDURE — 99213 OFFICE O/P EST LOW 20 MIN: CPT

## 2022-01-18 NOTE — HISTORY OF PRESENT ILLNESS
[Did you have an operation on your burn/wound injury?] : Did you have an operation on your burn/wound injury? No [Did this injury occur on the job?] : Did this injury occur on the job? No [de-identified] : right lower leg wound with infection 5 months ago -.> admitted for iv abx [de-identified] : healing

## 2022-01-18 NOTE — ASSESSMENT
[FreeTextEntry1] : right lower leg --> open wound --.-> local wound care --> healing \par \par rx aquacell [Wound Care] : wound care

## 2022-01-18 NOTE — REASON FOR VISIT
[Revisit] : revisit [Were you seen in the Emergency Room?] : seen in the emergency room [Were you admitted to the burn center at [facility]?] : not admitted to the burn center at [facility]

## 2022-01-18 NOTE — PHYSICAL EXAM
[Healing] : healing [Size%: ______] : Size: [unfilled]% [Infected?] : Infected: No [3] : 3 out of 10 [Abnormal] : abnormal [Medium] : medium [] : no [de-identified] : right lower leg --> open wound --.-> local wound care --> healing \par \par rx aquacell [TWNoteComboBox1] : xeroform

## 2022-02-09 ENCOUNTER — APPOINTMENT (OUTPATIENT)
Dept: BREAST CENTER | Facility: CLINIC | Age: 77
End: 2022-02-09
Payer: MEDICAID

## 2022-02-09 VITALS
TEMPERATURE: 97.3 F | HEIGHT: 65 IN | BODY MASS INDEX: 40.98 KG/M2 | WEIGHT: 246 LBS | SYSTOLIC BLOOD PRESSURE: 144 MMHG | DIASTOLIC BLOOD PRESSURE: 82 MMHG

## 2022-02-09 PROCEDURE — 99213 OFFICE O/P EST LOW 20 MIN: CPT

## 2022-02-09 NOTE — PHYSICAL EXAM
[Normocephalic] : normocephalic [Atraumatic] : atraumatic [No Supraclavicular Adenopathy] : no supraclavicular adenopathy [No dominant masses] : no dominant masses in right breast  [No dominant masses] : no dominant masses left breast [No Rashes] : no rashes [No Ulceration] : no ulceration [de-identified] : well healed surgical scars.\par s/p bilateral mastectomy; no reconstruction. [de-identified] : No axillary lymphadenopathy appreciated. [de-identified] : No axillary lymphadenopathy appreciated.

## 2022-02-09 NOTE — ASSESSMENT
[FreeTextEntry1] : ALDO is a sade 76 year old patient who presented today in follow up for a history of right breast cancer; status post bilateral mastectomy (September 2019).  \par She has been doing well with no new breast related complaints.  She continues to have intermittent chest wall pain, but this has not changed.\par Physical exam was unrevealing today.\par \par She will return for follow-up and clinical breast exam in one year.\par It was explained to the patient that she should establish care with\par a gynecologist - this is important with her taking Tamoxifen.\par She will continue follow-up with her medical oncologist.\par \par The patient was informed that Dr. Geno Candelario will no longer be practicing here as of the end of August 2021; her care will be continued with the practice.\par \par I spent a total of 20 minutes of face to face time with this patient, greater than 50% of which was spent in counseling and/or coordination of care.\par All of her questions were appropriately answered.\par She knows to call with any concerns.

## 2022-02-09 NOTE — HISTORY OF PRESENT ILLNESS
[FreeTextEntry1] : PCP: Glenroy Hutchins MD\par \par Patient with right breast invasive well to moderately differentiated ductal carcinoma on ultrasound guided core biopsy 6/12/19; 2:00 N8, 19 mm (cork).  \par Estrogen receptor positive, \par Progesterone receptor positive, \par HER2 negative, 1+\par Ki-67: 10-15%\par \par Right breast invasive well to moderately differentiated ductal carcinoma on ultrasound guided core biopsy 6/12/19; 2:00 N10, 10 mm (tophat).   \par Estrogen receptor positive, \par Progesterone receptor positive, \par HER2 negative, 1+\par Ki-67: 10-15%\par \par Left breast fibroadenoma on ultrasound guided core biopsy 6/12/19; 1:00 N5, (hourglass).  Findings are benign concordant and short term follow up left breast ultrasound recommended in six months.  \par \par No prior complaints related to the breasts. \par Her family history is significant for her sister with breast cancer at 68; her maternal aunt with breast cancer at 65; and her niece (sister's daughter) with breast cancer at 54.  \par \par Bilateral Breast MRI 7/26/19: 5 cm area of clumped non mass enhancement in the lateral aspect of the left breast; MR guided core biopsy recommended.  Per patient and her son, she prefers bilateral mastectomy and therefore biopsy was not indicated. \par \par Status post bilateral simple mastectomy and SLNB 9/18/19 demonstrating Left 0/1 (-); Left hyalinized fibroadenoma.  Right 0/1; Right breast two healing prior biopsy sites in the UIQ with invasive moderately differentiated ductal carcinoma, 22 mm associated with calcifications and DCIS, cribiform and micropapillary types, intermediate nuclear grade; with no definitive foci of LVI.  AJCC 8th Edition Pathologic Stage: pT2, p(sn)N0, pMx.\par \par Patient started antibiotics 4 days after discharge from hospital. \par \par Oncotype DX RS 6\par On Tamoxifen with Dr. Hartley.  \par \par Status post bilateral chest wall revision 2/19/20 demonstrating left benign skin and subcutaneous adipose tissue with dermal scar, 143 grams, and right benign skin and subcutaneous adipose tissue with small seborrheic keratosis, dermal scar, 274 grams.  \par \par \par ALDO DYKES is a 76 year old female patient who presents today in follow up for Stage IIA right breast cancer.\par Since her last visit, she has no new breast related complaints. \par \par She presents today for evaluation.\par \par

## 2022-02-09 NOTE — REVIEW OF SYSTEMS
[Negative] : Constitutional [Skin Lesions] : no skin lesions [Breast Pain] : no breast pain [Breast Lump] : no breast lump

## 2022-02-09 NOTE — REASON FOR VISIT
[Follow-Up: _____] : a [unfilled] follow-up visit [Family Member] : family member [FreeTextEntry1] : h/o Stage IIA right breast cancer; s/p B/L mastectomy (Sept 2019).

## 2022-02-17 ENCOUNTER — APPOINTMENT (OUTPATIENT)
Dept: HEMATOLOGY ONCOLOGY | Facility: CLINIC | Age: 77
End: 2022-02-17
Payer: MEDICAID

## 2022-02-17 ENCOUNTER — OUTPATIENT (OUTPATIENT)
Dept: OUTPATIENT SERVICES | Facility: HOSPITAL | Age: 77
LOS: 1 days | Discharge: HOME | End: 2022-02-17

## 2022-02-17 VITALS
RESPIRATION RATE: 16 BRPM | DIASTOLIC BLOOD PRESSURE: 84 MMHG | HEIGHT: 65 IN | TEMPERATURE: 98.7 F | WEIGHT: 238 LBS | SYSTOLIC BLOOD PRESSURE: 182 MMHG | BODY MASS INDEX: 39.65 KG/M2 | HEART RATE: 69 BPM

## 2022-02-17 DIAGNOSIS — Z90.10 ACQUIRED ABSENCE OF UNSPECIFIED BREAST AND NIPPLE: Chronic | ICD-10-CM

## 2022-02-17 DIAGNOSIS — Z90.49 ACQUIRED ABSENCE OF OTHER SPECIFIED PARTS OF DIGESTIVE TRACT: Chronic | ICD-10-CM

## 2022-02-17 DIAGNOSIS — Z98.51 TUBAL LIGATION STATUS: Chronic | ICD-10-CM

## 2022-02-17 DIAGNOSIS — Z98.890 OTHER SPECIFIED POSTPROCEDURAL STATES: Chronic | ICD-10-CM

## 2022-02-17 DIAGNOSIS — Z90.89 ACQUIRED ABSENCE OF OTHER ORGANS: Chronic | ICD-10-CM

## 2022-02-17 PROCEDURE — 99214 OFFICE O/P EST MOD 30 MIN: CPT

## 2022-02-17 NOTE — PHYSICAL EXAM
[Fully active, able to carry on all pre-disease performance without restriction] : Status 0 - Fully active, able to carry on all pre-disease performance without restriction [Obese] : obese [Normal] : affect appropriate [de-identified] : Uses a cane [de-identified] : s/p bilateral mastectomy; no lymphadenopathy noted bilaterally [de-identified] : obese [de-identified] : open wound on right shin, healing [de-identified] : Ambulates with cane

## 2022-02-17 NOTE — REVIEW OF SYSTEMS
[Negative] : Allergic/Immunologic [de-identified] : right shin open wound, with aquacel, healing well, no bleeding or abnormal discharge noted.

## 2022-02-17 NOTE — HISTORY OF PRESENT ILLNESS
[de-identified] : 74 yof, PMH of asthma, HTN, hypothyroidism, severe osteoarthritis (receives cortisone injections) presents for initial evaluation of R-sided breast cancer. \par \par  Patient palpated a mass in her right breast and underwent ultrasound-guided core biopsy, which showed invasive ductal carcinoma well to moderately differentiated ER/NC positive %, HER2 negative, Ki-67 10-15%.  B/L breast MRI revealed 5 cm non-mass enhancement lateral left breast.  No biopsy was done secondary to patient preference to undergo B/L mastectomy and sentinel lymph node biopsy.  Pathology from B/L mastectomy and SLNB revealed left breast fibroadenoma, right breast invasive ductal carcinoma, moderately differentiated, 22 mm, a/w calcifications and DCIS, no LVI, negative margins. \par \par  Pathology pT2p(sn)N0pMx (stage II A).  ER >90%, NC >90%, Ki-67 10-15%.  Oncotype dx score is 6.  Patient now presents with daughter regarding initiation of antihormonal therapy.\par \par Patient experienced menarche at age 11 and menopause at age 49.  She is  and has never .  She took OCPs for about one year in late 20s.  Never took hormone replacement therapy.\par \par  [de-identified] : 2/6/20\par  Patient here for follow up, feeling well.  She denies any new complaints.  Patient denies any new palpable breast lumps or pain, denies skin changes, denies nipple discharge.  Patient denies cough, shortness of breath, denies fever, denies bone pain.\par She started taking Tamoxifen since Nov 2019, tolerating it well.\par Denies hot flashes, sweating.\par Pt is going for b/l axillary fat excision.\par \par 9/1/2021\par Patient is here to follow up for breast cancer, accompanied by son Prashanth who is also interpreting for the patient since patient refused to use the \par She is compliant with Tamoxifen since 11/2019, tolerating well\par She denies any new chest wall mass/pain, no vaginal bleeding, hot flushes or mood swings\par She is concerned that her hair falls in the morning\par She has not seen gyne and GI for colonoscopy\par She f/u with PCP Dr Glenroy Hutchins\par \par 2/17/2022\par Patient is here to follow up for breast cancer, accompanied by her son Prashanth who is also interpreting for the patient since patient refused to use the \par She is compliant with Tamoxifen since 11/2019, tolerating well.\par She denies any new chest wall mass/pain, no vaginal bleeding, hot flushes or mood swings.\par She has not seen gyne and GI for colonoscopy\par She had COVID infection on 12/2021. She had received 2 doses of COVID vaccine.

## 2022-02-17 NOTE — ASSESSMENT
[FreeTextEntry1] : Patient is a 76 year old female with newly diagnosed with stage IIA, HR positive, HER2 negative breast cancer s/p bilateral mastectomy, presents to discuss treatment with antihormonal therapy.\par \par RECOMMENDATION:\par Previous notes reviewed and all relevant radiology results discussed with Dr Hartley and communicated to the patient and his son, Prashanth.\par \par #Stage IIA HR positive HER2 negative breast cancer\par -- Continue Tamoxifen 20 mg daily - eRx sent\par Side effects of Tamoxifen including hot flashes, menstrual irregularities, weight gain, mood changes, DVT, cataracts and uterine cancer were discussed.\par Compliance assessed on today's visit.\par -- Advised to follow up with gyne - Women's health number provided.\par -- Emphasized to follow up with GI for colonoscopy.\par -- Healthy lifestyle discussed.\par -- Continue to follow up with PCP as recommended.\par -- Encouraged to get the COVID booster dose.\par \par #Right leg wound\par -- Dressing change as recommended. Skin care advised.\par -- Follow up with Dr Mantilla as scheduled.\par \par #Bilateral knee pain\par -- Pain meds as needed.\par -- Follow up with ortho as scheduled.\par \par All her concerns were addressed during the visit\par RTC in 6 months\par \par Case was seen and discussed with Dr. Hartley who agreed with assessment and plan.\par \par \par \par \par

## 2022-02-18 DIAGNOSIS — Z79.810 LONG TERM (CURRENT) USE OF SELECTIVE ESTROGEN RECEPTOR MODULATORS (SERMS): ICD-10-CM

## 2022-02-18 DIAGNOSIS — C50.411 MALIGNANT NEOPLASM OF UPPER-OUTER QUADRANT OF RIGHT FEMALE BREAST: ICD-10-CM

## 2022-03-10 ENCOUNTER — APPOINTMENT (OUTPATIENT)
Dept: BURN CARE | Facility: CLINIC | Age: 77
End: 2022-03-10

## 2022-04-22 NOTE — DISCHARGE NOTE ADULT - PATIENT PORTAL LINK FT
You can access the [facility], offered by [facility], by registering with the following website: [email or web address] Patient's first and last name, , procedure, and correct site confirmed prior to the start of procedure.

## 2022-05-11 ENCOUNTER — APPOINTMENT (OUTPATIENT)
Dept: ORTHOPEDIC SURGERY | Facility: CLINIC | Age: 77
End: 2022-05-11

## 2022-05-16 NOTE — H&P ADULT - NSHPPOASURGSITEINCISION_GEN_ALL_CORE
no 73yM pmhx CKD5, CAD, CHF, HLD, HTN BIBEMS after being found unresponsive in hemodialysis (per EMS pt received most of treatment); no pulse so CPR initiated, found to be in vfib, received 4x epi and amio 300mg, down time in field 20min. ROSC achieved as pt was being wheeled into ED.

## 2022-05-17 NOTE — PATIENT PROFILE ADULT. - FUNCTIONAL SCREEN CURRENT LEVEL: SWALLOWING (IF SCORE 2 OR MORE FOR ANY ITEM, CONSULT REHAB SERVICES), MLM)
(0) swallows foods/liquids without difficulty Pt. educated on benefits and how to privately purchase if needed. Pt reports he owns a shower chair already./grab bar

## 2022-05-19 NOTE — ASU PATIENT PROFILE, ADULT - HARM RISK FACTORS
Detail Level: Detailed Was A Bandage Applied: No Punch Size In Mm: 3 Biopsy Type: H and E Anesthesia Type: 1% lidocaine with epinephrine Anesthesia Volume In Cc (Will Not Render If 0): 0.5 Additional Anesthesia Volume In Cc (Will Not Render If 0): 0 Hemostasis: Electrocautery Epidermal Sutures: 3-0 Polypropylene Number Of Epidermal Sutures (Optional): 1 Wound Care: Polysporin ointment Dressing: no dressing applied Suture Removal: 7 days Lab: 6 Consent: Written consent was obtained and risks were reviewed including but not limited to scarring, infection, bleeding, scabbing, incomplete removal, nerve damage and allergy to anesthesia. Post-Care Instructions: I reviewed with the patient in detail post-care instructions. Patient is to keep the biopsy site dry overnight, and then apply bacitracin twice daily until healed. Patient may apply hydrogen peroxide soaks to remove any crusting. Home Suture Removal Text: Patient was provided a home suture removal kit and will remove their sutures at home.  If they have any questions or difficulties they will call the office. Notification Instructions: Patient will be notified of biopsy results. However, patient instructed to call the office if not contacted within 2 weeks. Billing Type: Third-Party Bill Validate Note Data (See Information Below): Yes Information: Selecting Yes will display possible errors in your note based on the variables you have selected. This validation is only offered as a suggestion for you. PLEASE NOTE THAT THE VALIDATION TEXT WILL BE REMOVED WHEN YOU FINALIZE YOUR NOTE. IF YOU WANT TO FAX A PRELIMINARY NOTE YOU WILL NEED TO TOGGLE THIS TO 'NO' IF YOU DO NOT WANT IT IN YOUR FAXED NOTE. yes

## 2022-06-06 ENCOUNTER — APPOINTMENT (OUTPATIENT)
Dept: ORTHOPEDIC SURGERY | Facility: CLINIC | Age: 77
End: 2022-06-06
Payer: MEDICAID

## 2022-06-06 VITALS — TEMPERATURE: 97.8 F

## 2022-06-06 PROCEDURE — 20610 DRAIN/INJ JOINT/BURSA W/O US: CPT | Mod: LT

## 2022-06-06 NOTE — ASSESSMENT
[FreeTextEntry1] : Patient presents to the office for bilateral Euflexxa injections to both her arthritic knees, but we cannot perform these today. The pt has a wound on the right lower leg. We will send her back to Dr Mantilla.

## 2022-06-09 ENCOUNTER — OUTPATIENT (OUTPATIENT)
Dept: OUTPATIENT SERVICES | Facility: HOSPITAL | Age: 77
LOS: 1 days | Discharge: HOME | End: 2022-06-09

## 2022-06-09 ENCOUNTER — APPOINTMENT (OUTPATIENT)
Dept: BURN CARE | Facility: CLINIC | Age: 77
End: 2022-06-09
Payer: MEDICAID

## 2022-06-09 DIAGNOSIS — Z98.51 TUBAL LIGATION STATUS: Chronic | ICD-10-CM

## 2022-06-09 DIAGNOSIS — Z98.890 OTHER SPECIFIED POSTPROCEDURAL STATES: Chronic | ICD-10-CM

## 2022-06-09 DIAGNOSIS — Z90.10 ACQUIRED ABSENCE OF UNSPECIFIED BREAST AND NIPPLE: Chronic | ICD-10-CM

## 2022-06-09 DIAGNOSIS — Z90.89 ACQUIRED ABSENCE OF OTHER ORGANS: Chronic | ICD-10-CM

## 2022-06-09 DIAGNOSIS — Z90.49 ACQUIRED ABSENCE OF OTHER SPECIFIED PARTS OF DIGESTIVE TRACT: Chronic | ICD-10-CM

## 2022-06-09 PROCEDURE — 99214 OFFICE O/P EST MOD 30 MIN: CPT

## 2022-06-09 RX ORDER — COLLAGENASE SANTYL 250 [ARB'U]/G
250 OINTMENT TOPICAL DAILY
Qty: 1 | Refills: 0 | Status: ACTIVE | COMMUNITY
Start: 2022-06-09 | End: 1900-01-01

## 2022-06-12 LAB — BACTERIA SPEC CULT: ABNORMAL

## 2022-07-07 ENCOUNTER — OUTPATIENT (OUTPATIENT)
Dept: OUTPATIENT SERVICES | Facility: HOSPITAL | Age: 77
LOS: 1 days | Discharge: HOME | End: 2022-07-07

## 2022-07-07 ENCOUNTER — APPOINTMENT (OUTPATIENT)
Dept: BURN CARE | Facility: CLINIC | Age: 77
End: 2022-07-07

## 2022-07-07 DIAGNOSIS — Z90.89 ACQUIRED ABSENCE OF OTHER ORGANS: Chronic | ICD-10-CM

## 2022-07-07 DIAGNOSIS — S81.801A UNSPECIFIED OPEN WOUND, RIGHT LOWER LEG, INITIAL ENCOUNTER: ICD-10-CM

## 2022-07-07 DIAGNOSIS — Z98.51 TUBAL LIGATION STATUS: Chronic | ICD-10-CM

## 2022-07-07 DIAGNOSIS — Z90.49 ACQUIRED ABSENCE OF OTHER SPECIFIED PARTS OF DIGESTIVE TRACT: Chronic | ICD-10-CM

## 2022-07-07 DIAGNOSIS — Z98.890 OTHER SPECIFIED POSTPROCEDURAL STATES: Chronic | ICD-10-CM

## 2022-07-07 DIAGNOSIS — Z90.10 ACQUIRED ABSENCE OF UNSPECIFIED BREAST AND NIPPLE: Chronic | ICD-10-CM

## 2022-07-07 PROCEDURE — 99212 OFFICE O/P EST SF 10 MIN: CPT

## 2022-07-13 LAB — BACTERIA SPEC CULT: ABNORMAL

## 2022-07-14 PROBLEM — S81.801A LEG WOUND, RIGHT: Status: ACTIVE | Noted: 2022-07-14

## 2022-07-14 NOTE — ASSESSMENT
[Wound Care] : wound care [FreeTextEntry1] :  RLE wounds. Recent cultures positive for staph. Treated with Doxy x 1 week, now complete.\par no systemic symptoms. Today wounds look good. \par Counseled pt and son regarding wound care, recommended that she keep wounds dressed and covered. Also recommended compression wrap or stocking \par \par Dssg with Hydrogel, Adaptic, Kerlix, Ace. \par Return to clinic 6-8 wks.

## 2022-07-14 NOTE — PHYSICAL EXAM
[Healing] : healing [Size%: ______] : Size: [unfilled]% [Infected?] : Infected: Yes [3] : 3 out of 10 [Abnormal] : abnormal [Medium] : medium [] : no [FreeTextEntry1] : p [de-identified] : RLE anterior shin, 4 small wounds, largest 2x3cm, minimal serosang drainage, yellow slough. Erythema present. Swelling present. [TWNoteComboBox1] : adaptic [TWNoteComboBox2] : Hydragel

## 2022-07-14 NOTE — HISTORY OF PRESENT ILLNESS
[Did you have an operation on your burn/wound injury?] : Did you have an operation on your burn/wound injury? No [Did this injury occur on the job?] : Did this injury occur on the job? No [de-identified] : R lower leg wound infection 6 months ago. Admitted for IV ABx at that time. [de-identified] : Cultures sent at last visit, numerous staph --> 1 wk Doxycycline Rx finished yesterday and tolerated well.

## 2022-07-14 NOTE — REASON FOR VISIT
[Revisit] : revisit [Were you seen in the Emergency Room?] : seen in the emergency room [Were you admitted to the burn center at [facility]?] : not admitted to the burn center at [facility]

## 2022-07-15 DIAGNOSIS — B95.8 UNSPECIFIED STAPHYLOCOCCUS AS THE CAUSE OF DISEASES CLASSIFIED ELSEWHERE: ICD-10-CM

## 2022-07-15 DIAGNOSIS — S81.801A UNSPECIFIED OPEN WOUND, RIGHT LOWER LEG, INITIAL ENCOUNTER: ICD-10-CM
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
## 2022-07-20 NOTE — PATIENT PROFILE ADULT - FUNCTIONAL SCREEN CURRENT LEVEL: EATING, MLM
"Chief Complaint   Patient presents with     Consult     /76 (BP Location: Right arm, Patient Position: Sitting, Cuff Size: Adult Regular)   Pulse 83   Ht 5' 6.14\" (168 cm)   Wt 148 lb 13 oz (67.5 kg)   LMP 01/21/2022   HC 54.8 cm (21.58\")   BMI 23.92 kg/m       Ludy Tomlinson LPN  July 20, 2022  "
0 = independent

## 2022-07-21 ENCOUNTER — APPOINTMENT (OUTPATIENT)
Dept: BURN CARE | Facility: CLINIC | Age: 77
End: 2022-07-21

## 2022-08-18 ENCOUNTER — APPOINTMENT (OUTPATIENT)
Dept: HEMATOLOGY ONCOLOGY | Facility: CLINIC | Age: 77
End: 2022-08-18

## 2022-08-18 ENCOUNTER — OUTPATIENT (OUTPATIENT)
Dept: OUTPATIENT SERVICES | Facility: HOSPITAL | Age: 77
LOS: 1 days | Discharge: HOME | End: 2022-08-18

## 2022-08-18 VITALS
RESPIRATION RATE: 16 BRPM | WEIGHT: 234 LBS | BODY MASS INDEX: 38.99 KG/M2 | HEART RATE: 76 BPM | HEIGHT: 65 IN | DIASTOLIC BLOOD PRESSURE: 60 MMHG | SYSTOLIC BLOOD PRESSURE: 140 MMHG | TEMPERATURE: 97.1 F

## 2022-08-18 DIAGNOSIS — Z98.51 TUBAL LIGATION STATUS: Chronic | ICD-10-CM

## 2022-08-18 DIAGNOSIS — Z90.89 ACQUIRED ABSENCE OF OTHER ORGANS: Chronic | ICD-10-CM

## 2022-08-18 DIAGNOSIS — Z90.49 ACQUIRED ABSENCE OF OTHER SPECIFIED PARTS OF DIGESTIVE TRACT: Chronic | ICD-10-CM

## 2022-08-18 DIAGNOSIS — Z90.10 ACQUIRED ABSENCE OF UNSPECIFIED BREAST AND NIPPLE: Chronic | ICD-10-CM

## 2022-08-18 DIAGNOSIS — Z98.890 OTHER SPECIFIED POSTPROCEDURAL STATES: Chronic | ICD-10-CM

## 2022-08-18 PROCEDURE — 99214 OFFICE O/P EST MOD 30 MIN: CPT

## 2022-08-23 NOTE — REVIEW OF SYSTEMS
[Negative] : Allergic/Immunologic [FreeTextEntry7] : + Bloating [de-identified] : right shin wound, approximated, no bleeding or abnormal discharge noted.

## 2022-08-23 NOTE — ASSESSMENT
[FreeTextEntry1] : Patient is a 76 year old female with newly diagnosed with stage IIA, HR positive, HER2 negative breast cancer s/p bilateral mastectomy, presents to discuss treatment with antihormonal therapy.\par \par RECOMMENDATION:\par Previous notes reviewed and all relevant radiology results discussed with Dr Hartley and were communicated to the patient and his son, Prashanth.\par \par #Stage IIA HR positive HER2 negative breast cancer\par -- Breast exam today is unrevealing.\par -- Continue Tamoxifen 20 mg daily - eRx sent\par Side effects of Tamoxifen including hot flashes, menstrual irregularities, weight gain, mood changes, DVT, cataracts and uterine cancer were discussed.\par Compliance assessed on today's visit.\par -- Advised to follow up with gyne - provided with a [place] speaking gyne\par -- Emphasized to follow up with GI for colonoscopy.\par -- Healthy lifestyle discussed.\par -- Continue to follow up with PCP as recommended.\par \par #Right leg wound\par -- Dressing change as recommended. Skin care advised.\par -- Follow up with Dr Mantilla as scheduled.\par \par #Bilateral knee pain\par -- Pain meds as needed.\par -- Follow up with ortho as scheduled.\par \par All her concerns were addressed during the visit\par RTC in 6 months\par \par Case was seen and discussed with Dr. Hartley who agreed with assessment and plan.

## 2022-08-23 NOTE — PHYSICAL EXAM
[Fully active, able to carry on all pre-disease performance without restriction] : Status 0 - Fully active, able to carry on all pre-disease performance without restriction [Obese] : obese [Normal] : affect appropriate [de-identified] : Uses a cane [de-identified] : s/p bilateral mastectomy; no lymphadenopathy noted bilaterally [de-identified] : obese, distended [de-identified] : wound on right shin, approximated; no bleeding/abnormal discharge noted. [de-identified] : Ambulates with cane

## 2022-08-23 NOTE — HISTORY OF PRESENT ILLNESS
[de-identified] : 74 yof, PMH of asthma, HTN, hypothyroidism, severe osteoarthritis (receives cortisone injections) presents for initial evaluation of R-sided breast cancer. \par \par  Patient palpated a mass in her right breast and underwent ultrasound-guided core biopsy, which showed invasive ductal carcinoma well to moderately differentiated ER/MO positive %, HER2 negative, Ki-67 10-15%.  B/L breast MRI revealed 5 cm non-mass enhancement lateral left breast.  No biopsy was done secondary to patient preference to undergo B/L mastectomy and sentinel lymph node biopsy.  Pathology from B/L mastectomy and SLNB revealed left breast fibroadenoma, right breast invasive ductal carcinoma, moderately differentiated, 22 mm, a/w calcifications and DCIS, no LVI, negative margins. \par \par  Pathology pT2p(sn)N0pMx (stage II A).  ER >90%, MO >90%, Ki-67 10-15%.  Oncotype dx score is 6.  Patient now presents with daughter regarding initiation of antihormonal therapy.\par \par Patient experienced menarche at age 11 and menopause at age 49.  She is  and has never .  She took OCPs for about one year in late 20s.  Never took hormone replacement therapy.\par \par  [de-identified] : 2/6/20\par  Patient here for follow up, feeling well.  She denies any new complaints.  Patient denies any new palpable breast lumps or pain, denies skin changes, denies nipple discharge.  Patient denies cough, shortness of breath, denies fever, denies bone pain.\par She started taking Tamoxifen since Nov 2019, tolerating it well.\par Denies hot flashes, sweating.\par Pt is going for b/l axillary fat excision.\par \par 9/1/2021\par Patient is here to follow up for breast cancer, accompanied by son Prashanth who is also interpreting for the patient since patient refused to use the \par She is compliant with Tamoxifen since 11/2019, tolerating well\par She denies any new chest wall mass/pain, no vaginal bleeding, hot flushes or mood swings\par She is concerned that her hair falls in the morning\par She has not seen gyne and GI for colonoscopy\par She f/u with PCP Dr Glenroy Hutchins\par \par 2/17/2022\par Patient is here to follow up for breast cancer, accompanied by her son Prashanth who is also interpreting for the patient since patient refused to use the \par She is compliant with Tamoxifen since 11/2019, tolerating well.\par She denies any new chest wall mass/pain, no vaginal bleeding, hot flushes or mood swings.\par She has not seen gyne and GI for colonoscopy\par She had COVID infection on 12/2021. She had received 2 doses of COVID vaccine.\par \par 8/18/22\par Patient is here to follow up for breast cancer, accompanied by her son Prahsanth who is also interpreting for the patient since patient refused to use the .\par She is compliant with Tamoxifen since 11/2019, tolerating well.\par She denies any new chest wall mass/pain, no vaginal bleeding, hot flushes or mood swings.\par She states that she has been feeling bloated, has not been seeing gyne and not updated with colonoscopy.

## 2022-08-24 DIAGNOSIS — Z79.810 LONG TERM (CURRENT) USE OF SELECTIVE ESTROGEN RECEPTOR MODULATORS (SERMS): ICD-10-CM

## 2022-08-24 DIAGNOSIS — C50.411 MALIGNANT NEOPLASM OF UPPER-OUTER QUADRANT OF RIGHT FEMALE BREAST: ICD-10-CM

## 2022-08-25 ENCOUNTER — APPOINTMENT (OUTPATIENT)
Dept: BURN CARE | Facility: CLINIC | Age: 77
End: 2022-08-25

## 2022-08-30 NOTE — H&P PST ADULT - REASON FOR ADMISSION
PT PRESENTS TO PAST WITH NO SOB, CP, PALPITATIONS, DYSURIA, UTI OR URI AT PRESENT.   PT ABLE TO WALK UP 1--  FLIGHTS OF STEPS WITH NO SOB.  AS PER THE PT, THIS IS HIS/HER COMPLETE MEDICAL AND SURGICAL HX, INCLUDING MEDICATIONS PRESCRIBED AND OVER THE COUNTER  PT PRESENTS TO PAST WITH H/O--B/L BREAST CANCER. Hydroxychloroquine Counseling:  I discussed with the patient that a baseline ophthalmologic exam is needed at the start of therapy and every year thereafter while on therapy. A CBC may also be warranted for monitoring.  The side effects of this medication were discussed with the patient, including but not limited to agranulocytosis, aplastic anemia, seizures, rashes, retinopathy, and liver toxicity. Patient instructed to call the office should any adverse effect occur.  The patient verbalized understanding of the proper use and possible adverse effects of Plaquenil.  All the patient's questions and concerns were addressed.

## 2022-09-01 ENCOUNTER — OUTPATIENT (OUTPATIENT)
Dept: OUTPATIENT SERVICES | Facility: HOSPITAL | Age: 77
LOS: 1 days | Discharge: HOME | End: 2022-09-01

## 2022-09-01 ENCOUNTER — APPOINTMENT (OUTPATIENT)
Dept: BURN CARE | Facility: CLINIC | Age: 77
End: 2022-09-01

## 2022-09-01 DIAGNOSIS — Z90.89 ACQUIRED ABSENCE OF OTHER ORGANS: Chronic | ICD-10-CM

## 2022-09-01 DIAGNOSIS — Z98.51 TUBAL LIGATION STATUS: Chronic | ICD-10-CM

## 2022-09-01 DIAGNOSIS — Z98.890 OTHER SPECIFIED POSTPROCEDURAL STATES: Chronic | ICD-10-CM

## 2022-09-01 DIAGNOSIS — Z90.49 ACQUIRED ABSENCE OF OTHER SPECIFIED PARTS OF DIGESTIVE TRACT: Chronic | ICD-10-CM

## 2022-09-01 DIAGNOSIS — Z90.10 ACQUIRED ABSENCE OF UNSPECIFIED BREAST AND NIPPLE: Chronic | ICD-10-CM

## 2022-09-01 PROCEDURE — 99212 OFFICE O/P EST SF 10 MIN: CPT

## 2022-09-03 LAB — BACTERIA SPEC CULT: ABNORMAL

## 2022-09-04 NOTE — ASU PATIENT PROFILE, ADULT - HARM RISK FACTORS
Patient will reach at lines and tubes with right upper extremity needing to be redirected much of time. Attempting to provide calm environment and reorientation, but patient still assessed to be a risk of harm to self. Family aware for need of restraints. Will continue to monitor patient and assess for earliest removal capability. no

## 2022-09-06 DIAGNOSIS — S81.801A UNSPECIFIED OPEN WOUND, RIGHT LOWER LEG, INITIAL ENCOUNTER: ICD-10-CM
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
## 2022-09-15 ENCOUNTER — APPOINTMENT (OUTPATIENT)
Dept: BURN CARE | Facility: CLINIC | Age: 77
End: 2022-09-15

## 2022-09-15 ENCOUNTER — OUTPATIENT (OUTPATIENT)
Dept: OUTPATIENT SERVICES | Facility: HOSPITAL | Age: 77
LOS: 1 days | Discharge: HOME | End: 2022-09-15

## 2022-09-15 DIAGNOSIS — Z98.890 OTHER SPECIFIED POSTPROCEDURAL STATES: Chronic | ICD-10-CM

## 2022-09-15 DIAGNOSIS — Z90.10 ACQUIRED ABSENCE OF UNSPECIFIED BREAST AND NIPPLE: Chronic | ICD-10-CM

## 2022-09-15 DIAGNOSIS — Z90.49 ACQUIRED ABSENCE OF OTHER SPECIFIED PARTS OF DIGESTIVE TRACT: Chronic | ICD-10-CM

## 2022-09-15 DIAGNOSIS — Z90.89 ACQUIRED ABSENCE OF OTHER ORGANS: Chronic | ICD-10-CM

## 2022-09-15 DIAGNOSIS — Z98.51 TUBAL LIGATION STATUS: Chronic | ICD-10-CM

## 2022-09-15 PROCEDURE — 99212 OFFICE O/P EST SF 10 MIN: CPT
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
## 2022-09-16 DIAGNOSIS — S81.801D UNSPECIFIED OPEN WOUND, RIGHT LOWER LEG, SUBSEQUENT ENCOUNTER: ICD-10-CM

## 2022-09-18 LAB — BACTERIA SPEC CULT: ABNORMAL

## 2022-10-06 ENCOUNTER — APPOINTMENT (OUTPATIENT)
Dept: BURN CARE | Facility: CLINIC | Age: 77
End: 2022-10-06

## 2022-10-06 ENCOUNTER — OUTPATIENT (OUTPATIENT)
Dept: OUTPATIENT SERVICES | Facility: HOSPITAL | Age: 77
LOS: 1 days | Discharge: HOME | End: 2022-10-06

## 2022-10-06 DIAGNOSIS — Z98.51 TUBAL LIGATION STATUS: Chronic | ICD-10-CM

## 2022-10-06 DIAGNOSIS — Z90.10 ACQUIRED ABSENCE OF UNSPECIFIED BREAST AND NIPPLE: Chronic | ICD-10-CM

## 2022-10-06 DIAGNOSIS — Z90.89 ACQUIRED ABSENCE OF OTHER ORGANS: Chronic | ICD-10-CM

## 2022-10-06 DIAGNOSIS — Z98.890 OTHER SPECIFIED POSTPROCEDURAL STATES: Chronic | ICD-10-CM

## 2022-10-06 DIAGNOSIS — Z90.49 ACQUIRED ABSENCE OF OTHER SPECIFIED PARTS OF DIGESTIVE TRACT: Chronic | ICD-10-CM

## 2022-10-06 PROCEDURE — 99212 OFFICE O/P EST SF 10 MIN: CPT

## 2022-10-17 NOTE — PHYSICAL EXAM
[Healing] : healing [Size%: ______] : Size: [unfilled]% [Infected?] : Infected: No [3] : 3 out of 10 [Abnormal] : abnormal [Medium] : medium [] : no [de-identified] : right lower leg --> healing 1x1cm  --.-> local wound care --> culture sent \par \par \par \par if cx negative ok ortho left knee injection\par \par follow up 2 - 4  weeks  [TWNoteComboBox1] : bandaid

## 2022-10-17 NOTE — ASSESSMENT
[FreeTextEntry1] : right lower leg --> healing 1x1cm  --.-> local wound care --> culture sent \par \par \par \par if cx negative ok ortho left knee injection\par \par follow up 2 - 4  weeks  [Wound Care] : wound care

## 2022-10-17 NOTE — REASON FOR VISIT
[Revisit] : revisit [Were you seen in the Emergency Room?] : seen in the emergency room [Were you admitted to the burn center at [facility]?] : not admitted to the burn center at [facility]

## 2022-10-18 DIAGNOSIS — S81.801D UNSPECIFIED OPEN WOUND, RIGHT LOWER LEG, SUBSEQUENT ENCOUNTER: ICD-10-CM
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
## 2022-11-03 ENCOUNTER — APPOINTMENT (OUTPATIENT)
Dept: BURN CARE | Facility: CLINIC | Age: 77
End: 2022-11-03

## 2022-12-15 ENCOUNTER — APPOINTMENT (OUTPATIENT)
Dept: PLASTIC SURGERY | Facility: CLINIC | Age: 77
End: 2022-12-15

## 2022-12-16 ENCOUNTER — APPOINTMENT (OUTPATIENT)
Dept: ORTHOPEDIC SURGERY | Facility: CLINIC | Age: 77
End: 2022-12-16

## 2023-01-04 ENCOUNTER — APPOINTMENT (OUTPATIENT)
Dept: ORTHOPEDIC SURGERY | Facility: CLINIC | Age: 78
End: 2023-01-04
Payer: MEDICAID

## 2023-01-04 PROCEDURE — 20610 DRAIN/INJ JOINT/BURSA W/O US: CPT | Mod: LT

## 2023-01-04 RX ORDER — HYALURONATE SODIUM 20 MG/2 ML
20 SYRINGE (ML) INTRAARTICULAR
Refills: 0 | Status: COMPLETED | OUTPATIENT
Start: 2023-01-04

## 2023-01-04 RX ADMIN — Medication 2 MG/2ML: at 00:00

## 2023-01-04 NOTE — ASSESSMENT
[FreeTextEntry1] : Discussed at length with the patient the planned Euflexxa injection. The risks, benefits, convalescence and alternatives were reviewed. The possible side effects discussed included but were not limited to: pain, swelling, heat and redness. There symptoms are generally mild but if they are extensive then contact the office. Giving pain relievers by mouth such as NSAID’s or Tylenol can generally treat the reactions to steroid and lidocaine. Rare cases of infection have been noted. Rash, hives and itching may occur post injection. If you have muscle pain or cramps, flushing and or swelling of the face, rapid heart beat, nausea, dizziness, fever, chills, headache, difficulty breathing, swelling in the arms or legs, or have a prickly feeling of your skin, contact a health care provider immediately.\par \par Following this discussion, the knee was prepped with betadine and under sterile conditions the Euflexxa injection was performed with a 22 gauge needle. The needle was introduced into the joint, aspiration was performed to ensure intra-articular placement and the medication was injected. Upon withdrawal of the needle the site was cleaned with alcohol and a bandaid applied. The patient tolerated the injection well and there were no adverse effects. Post injection instructions included no strenuous activity for 24 hours, cryotherapy and if there are any adverse effects to contact the office.\par

## 2023-01-09 ENCOUNTER — APPOINTMENT (OUTPATIENT)
Dept: ORTHOPEDIC SURGERY | Facility: CLINIC | Age: 78
End: 2023-01-09
Payer: MEDICAID

## 2023-01-09 PROCEDURE — 20610 DRAIN/INJ JOINT/BURSA W/O US: CPT | Mod: LT

## 2023-01-09 RX ORDER — HYALURONATE SODIUM 20 MG/2 ML
20 SYRINGE (ML) INTRAARTICULAR
Refills: 0 | Status: COMPLETED | OUTPATIENT
Start: 2023-01-09

## 2023-01-09 RX ADMIN — Medication 2 MG/2ML: at 00:00

## 2023-01-09 NOTE — REASON FOR VISIT
[Procedure Visit] : a procedure visit for [Artificial Knee Joint] : an artificial knee joint [Knee Pain] : knee pain

## 2023-01-09 NOTE — HISTORY OF PRESENT ILLNESS
[de-identified] : 77 year old female presents to the office for a Euflexxa injection to the left arthritic and painful knee.

## 2023-01-18 ENCOUNTER — APPOINTMENT (OUTPATIENT)
Dept: ORTHOPEDIC SURGERY | Facility: CLINIC | Age: 78
End: 2023-01-18
Payer: MEDICAID

## 2023-01-18 PROCEDURE — 20610 DRAIN/INJ JOINT/BURSA W/O US: CPT | Mod: LT

## 2023-01-18 RX ORDER — HYALURONATE SODIUM 20 MG/2 ML
20 SYRINGE (ML) INTRAARTICULAR
Refills: 0 | Status: COMPLETED | OUTPATIENT
Start: 2023-01-18

## 2023-01-18 RX ADMIN — Medication 2 MG/2ML: at 00:00

## 2023-01-18 NOTE — HISTORY OF PRESENT ILLNESS
[de-identified] : 77 year old female presents to the office for a Euflexxa injection to the left arthritic and painful knee.

## 2023-01-18 NOTE — REASON FOR VISIT
[Procedure Visit] : a procedure visit for [Knee Pain] : knee pain [Osteoarthritis, Knee] : osteoarthritis of the knee

## 2023-01-27 ENCOUNTER — APPOINTMENT (OUTPATIENT)
Dept: OBGYN | Facility: CLINIC | Age: 78
End: 2023-01-27

## 2023-02-07 NOTE — PATIENT PROFILE ADULT. - BLOOD AVOIDANCE/RESTRICTIONS, PROFILE
Received call from patient  requesting refill(s) for meloxicam 15 mg tablet     Patient last seen on 09/15/22  Next appt scheduled for 02/23/23    E-prescribe to:     [facility] - [place], [place] - [address]     Will facilitate refill.      Jaqui Hernandez MA  [facility]    
Refill request:  meloxicam 15 mg tablet  
none

## 2023-02-16 ENCOUNTER — APPOINTMENT (OUTPATIENT)
Age: 78
End: 2023-02-16

## 2023-02-16 ENCOUNTER — APPOINTMENT (OUTPATIENT)
Dept: HEMATOLOGY ONCOLOGY | Facility: CLINIC | Age: 78
End: 2023-02-16

## 2023-02-20 NOTE — ED PROVIDER NOTE - CLINICAL SUMMARY MEDICAL DECISION MAKING FREE TEXT BOX
No Labs unremarkable. EKG no acute changes. CXR negative. CT head negative. Will D/C to follow up with neurology.

## 2023-02-21 ENCOUNTER — APPOINTMENT (OUTPATIENT)
Dept: BREAST CENTER | Facility: CLINIC | Age: 78
End: 2023-02-21
Payer: MEDICAID

## 2023-02-21 VITALS
HEIGHT: 65 IN | DIASTOLIC BLOOD PRESSURE: 81 MMHG | WEIGHT: 234 LBS | BODY MASS INDEX: 38.99 KG/M2 | SYSTOLIC BLOOD PRESSURE: 122 MMHG

## 2023-02-21 DIAGNOSIS — Z90.13 ACQUIRED ABSENCE OF BILATERAL BREASTS AND NIPPLES: ICD-10-CM

## 2023-02-21 PROCEDURE — 99214 OFFICE O/P EST MOD 30 MIN: CPT

## 2023-02-21 NOTE — ASSESSMENT
[FreeTextEntry1] : Patient is a 77F with history of right IDC (+/+/-) s/p B mastectomies with SLNB in 9/2019. pT2,pN0, pMx.  She is on tamoxifen.  She underwent bilateral chest wall revision in 2/2020.  Presents with some neck swelling and complaining of new axillary pain bilaterally.  She last saw Dr. Hartley in 8/2022 with a 6 month follow up recommended.  Patient recommended to follow up with Dr. Hartley as soon as possible.  We also spoke about obtaining a bilateral breast/axillary us and a neck ultrasound.  All questions and concerns were answered in detail.  Patient is for bilateral breast US now.  She is for neck US now.  She is to follow up with med onc as soon as possible.  She is to follow up with gyn as she in on tamoxifen.  Total time spent on encounter was greater than 30 minutes , which included face to face time with the patient, performing an exam, reviewing previous medical records, reviewing current imaging/ pathology, documenting in patient record and coordinating care/imaging. Greater than 50% of the encounter was spent on counseling and coordination of her breast issue.

## 2023-02-21 NOTE — PHYSICAL EXAM
[Normocephalic] : normocephalic [EOMI] : extra ocular movement intact [Examined in the supine and seated position] : examined in the supine and seated position [No dominant masses] : no dominant masses in right breast  [No dominant masses] : no dominant masses left breast [No Axillary Lymphadenopathy] : no left axillary lymphadenopathy [Soft] : abdomen soft [No Rashes] : no rashes [No Ulceration] : no ulceration [de-identified] : bilateral supraclavicular neck swelling noted [de-identified] : Nl respirations [de-identified] : s/p bilateral mastectomies [de-identified] : s/p bilateral mastectomies

## 2023-02-21 NOTE — REASON FOR VISIT
[Follow-Up: _____] : a [unfilled] follow-up visit [FreeTextEntry1] : Hx of right breast cancer s/p bilateral mastectomies in 2019

## 2023-02-21 NOTE — HISTORY OF PRESENT ILLNESS
[FreeTextEntry1] : Patient is a 77F with history of right breast IDC (+/+/-) s/p bilateral mastectomies with SLNB on 9/18/2019.\par Final path: Left 0/1 (-); Left hyalinized fibroadenoma.  Right 0/1; Right breast two healing prior biopsy sites in the UIQ with invasive moderately differentiated ductal carcinoma, 22 mm associated with calcifications and DCIS,with no definitive foci of LVI.  pT2, p(sn)N0, pMx.\par \par Oncotype DX RS 6\par On Tamoxifen with Dr. Hartley. \par \par Her family history is significant for her sister with breast cancer at 68; her maternal aunt with breast cancer at 65; and her niece (sister's daughter) with breast cancer at 54.  \par \par Her workup was as follows:\par Patient with right breast invasive well to moderately differentiated ductal carcinoma on ultrasound guided core biopsy 6/12/19; 2:00 N8, 19 mm (cork).  (+/+/-)\par \par Right breast invasive well to moderately differentiated ductal carcinoma on ultrasound guided core biopsy 6/12/19; 2:00 N10, 10 mm (tophat).   (+/+/-)\par \par Left breast fibroadenoma on ultrasound guided core biopsy 6/12/19; 1:00 N5, (hourglass).  Findings are benign concordant and short term follow up left breast ultrasound recommended in six months. \par \par Bilateral Breast MRI 7/26/19: 5 cm area of clumped non mass enhancement in the lateral aspect of the left breast; MR guided core biopsy recommended.  Per patient and her son, she prefers bilateral mastectomy and therefore biopsy was not indicated. \par \par 9/18/19: bilateral simple mastectomy and SLNB \par Left 0/1 (-); Left hyalinized fibroadenoma.  Right 0/1; Right breast two healing prior biopsy sites in the UIQ with invasive moderately differentiated ductal carcinoma, 22 mm associated with calcifications and DCIS, cribiform and micropapillary types, intermediate nuclear grade; with no definitive foci of LVI.  AJCC 8th Edition Pathologic Stage: pT2, p(sn)N0, pMx.\par \par Oncotype DX RS 6\par On Tamoxifen with Dr. Odilia.  \par \par Status post bilateral chest wall revision 2/19/20 demonstrating left benign skin and subcutaneous adipose tissue with dermal scar, 143 grams, and right benign skin and subcutaneous adipose tissue with small seborrheic keratosis, dermal scar, 274 grams.  \par \par She was last seen in clinic in 2/2022 and recommended an annual follow up.\par \par Patient states she has noted some neck swelling recently. Denies any other associated symptoms. Also complains of bilateral axillary pain.

## 2023-03-09 ENCOUNTER — APPOINTMENT (OUTPATIENT)
Dept: HEMATOLOGY ONCOLOGY | Facility: CLINIC | Age: 78
End: 2023-03-09

## 2023-03-17 ENCOUNTER — APPOINTMENT (OUTPATIENT)
Dept: OBGYN | Facility: CLINIC | Age: 78
End: 2023-03-17

## 2023-06-08 ENCOUNTER — OUTPATIENT (OUTPATIENT)
Dept: OUTPATIENT SERVICES | Facility: HOSPITAL | Age: 78
LOS: 1 days | End: 2023-06-08
Payer: MEDICAID

## 2023-06-08 ENCOUNTER — APPOINTMENT (OUTPATIENT)
Dept: HEMATOLOGY ONCOLOGY | Facility: CLINIC | Age: 78
End: 2023-06-08
Payer: MEDICAID

## 2023-06-08 VITALS
TEMPERATURE: 98.8 F | SYSTOLIC BLOOD PRESSURE: 186 MMHG | OXYGEN SATURATION: 95 % | DIASTOLIC BLOOD PRESSURE: 82 MMHG | RESPIRATION RATE: 16 BRPM | HEART RATE: 49 BPM

## 2023-06-08 DIAGNOSIS — Z98.890 OTHER SPECIFIED POSTPROCEDURAL STATES: Chronic | ICD-10-CM

## 2023-06-08 DIAGNOSIS — Z90.10 ACQUIRED ABSENCE OF UNSPECIFIED BREAST AND NIPPLE: Chronic | ICD-10-CM

## 2023-06-08 DIAGNOSIS — C50.411 MALIGNANT NEOPLASM OF UPPER-OUTER QUADRANT OF RIGHT FEMALE BREAST: ICD-10-CM

## 2023-06-08 DIAGNOSIS — Z98.51 TUBAL LIGATION STATUS: Chronic | ICD-10-CM

## 2023-06-08 DIAGNOSIS — Z90.89 ACQUIRED ABSENCE OF OTHER ORGANS: Chronic | ICD-10-CM

## 2023-06-08 DIAGNOSIS — Z90.49 ACQUIRED ABSENCE OF OTHER SPECIFIED PARTS OF DIGESTIVE TRACT: Chronic | ICD-10-CM

## 2023-06-08 DIAGNOSIS — Z79.810 LONG TERM (CURRENT) USE OF SELECTIVE ESTROGEN RECEPTOR MODULATORS (SERMS): ICD-10-CM

## 2023-06-08 PROCEDURE — 99214 OFFICE O/P EST MOD 30 MIN: CPT

## 2023-06-08 NOTE — REVIEW OF SYSTEMS
[Negative] : Allergic/Immunologic [FreeTextEntry7] : + Bloating [de-identified] : right shin wound, approximated, no bleeding or abnormal discharge noted.

## 2023-06-08 NOTE — HISTORY OF PRESENT ILLNESS
[de-identified] : 74 yof, PMH of asthma, HTN, hypothyroidism, severe osteoarthritis (receives cortisone injections) presents for initial evaluation of R-sided breast cancer. \par \par  Patient palpated a mass in her right breast and underwent ultrasound-guided core biopsy, which showed invasive ductal carcinoma well to moderately differentiated ER/MO positive %, HER2 negative, Ki-67 10-15%.  B/L breast MRI revealed 5 cm non-mass enhancement lateral left breast.  No biopsy was done secondary to patient preference to undergo B/L mastectomy and sentinel lymph node biopsy.  Pathology from B/L mastectomy and SLNB revealed left breast fibroadenoma, right breast invasive ductal carcinoma, moderately differentiated, 22 mm, a/w calcifications and DCIS, no LVI, negative margins. \par \par  Pathology pT2p(sn)N0pMx (stage II A).  ER >90%, MO >90%, Ki-67 10-15%.  Oncotype dx score is 6.  Patient now presents with daughter regarding initiation of antihormonal therapy.\par \par Patient experienced menarche at age 11 and menopause at age 49.  She is  and has never .  She took OCPs for about one year in late 20s.  Never took hormone replacement therapy.\par \par  [de-identified] : 2/6/20\par  Patient here for follow up, feeling well.  She denies any new complaints.  Patient denies any new palpable breast lumps or pain, denies skin changes, denies nipple discharge.  Patient denies cough, shortness of breath, denies fever, denies bone pain.\par She started taking Tamoxifen since Nov 2019, tolerating it well.\par Denies hot flashes, sweating.\par Pt is going for b/l axillary fat excision.\par \par 9/1/2021\par Patient is here to follow up for breast cancer, accompanied by son Prashanth who is also interpreting for the patient since patient refused to use the \par She is compliant with Tamoxifen since 11/2019, tolerating well\par She denies any new chest wall mass/pain, no vaginal bleeding, hot flushes or mood swings\par She is concerned that her hair falls in the morning\par She has not seen gyne and GI for colonoscopy\par She f/u with PCP Dr Glenroy Hutchins\par \par 2/17/2022\par Patient is here to follow up for breast cancer, accompanied by her son Prashanth who is also interpreting for the patient since patient refused to use the \par She is compliant with Tamoxifen since 11/2019, tolerating well.\par She denies any new chest wall mass/pain, no vaginal bleeding, hot flushes or mood swings.\par She has not seen gyne and GI for colonoscopy\par She had COVID infection on 12/2021. She had received 2 doses of COVID vaccine.\par \par 8/18/22\par Patient is here to follow up for breast cancer, accompanied by her son Prashanth who is also interpreting for the patient since patient refused to use the .\par She is compliant with Tamoxifen since 11/2019, tolerating well.\par She denies any new chest wall mass/pain, no vaginal bleeding, hot flushes or mood swings.\par She states that she has been feeling bloated, has not been seeing gyne and not updated with colonoscopy. \par \par 6/8/23\par Pt is here for follow up.\par C/O symptoms of bluish to whitish discoloration of her hands associated with pain which resolves spontaneously.\par Has been compliant with Tamoxifen\par Has not had GYN or GI follow up

## 2023-06-08 NOTE — PHYSICAL EXAM
[Fully active, able to carry on all pre-disease performance without restriction] : Status 0 - Fully active, able to carry on all pre-disease performance without restriction [Obese] : obese [Normal] : affect appropriate [de-identified] : Uses a cane [de-identified] : s/p bilateral mastectomy; no lymphadenopathy noted bilaterally [de-identified] : obese, distended [de-identified] : wound on right shin, approximated; no bleeding/abnormal discharge noted. [de-identified] : Ambulates with cane

## 2023-06-08 NOTE — ASSESSMENT
[FreeTextEntry1] : Patient is a 77 year old female with  stage IIA, HR positive, HER2 negative breast cancer s/p bilateral mastectomy, on tamoxifen since 11/19\par \par Oncotype DX RS 6\par \par RECOMMENDATION:\par Previous notes reviewed and all relevant radiology results discussed with  and were communicated to the patient and his son, Prashanth.\par \par #Stage IIA HR positive HER2 negative breast cancer\par KADY\par \par -- Continue Tamoxifen 20 mg daily \par Side effects of Tamoxifen including hot flashes, menstrual irregularities, weight gain, mood changes, DVT, cataracts and uterine cancer were discussed.\par Compliance assessed on today's visit.\par -- Advised to follow up with gyne - \par -- Emphasized to follow up with GI for colonoscopy.\par -- Healthy lifestyle discussed.\par -- Continue to follow up with PCP as recommended.\par \par # ? Raynauds syndrome\par Rheum eval\par \par RTC 6 months\par \par All her concerns were addressed during the visit\par

## 2023-06-09 DIAGNOSIS — C50.411 MALIGNANT NEOPLASM OF UPPER-OUTER QUADRANT OF RIGHT FEMALE BREAST: ICD-10-CM

## 2023-06-12 DIAGNOSIS — Z79.810 LONG TERM (CURRENT) USE OF SELECTIVE ESTROGEN RECEPTOR MODULATORS (SERMS): ICD-10-CM

## 2023-07-05 ENCOUNTER — APPOINTMENT (OUTPATIENT)
Dept: PEDIATRIC ALLERGY IMMUNOLOGY | Facility: CLINIC | Age: 78
End: 2023-07-05
Payer: MEDICAID

## 2023-07-05 VITALS
DIASTOLIC BLOOD PRESSURE: 70 MMHG | HEIGHT: 65 IN | SYSTOLIC BLOOD PRESSURE: 120 MMHG | WEIGHT: 234 LBS | BODY MASS INDEX: 38.99 KG/M2

## 2023-07-05 DIAGNOSIS — J45.20 MILD INTERMITTENT ASTHMA, UNCOMPLICATED: ICD-10-CM

## 2023-07-05 DIAGNOSIS — J31.0 CHRONIC RHINITIS: ICD-10-CM

## 2023-07-05 DIAGNOSIS — R05.3 CHRONIC COUGH: ICD-10-CM

## 2023-07-05 PROCEDURE — 99204 OFFICE O/P NEW MOD 45 MIN: CPT

## 2023-07-05 RX ORDER — FLUTICASONE PROPIONATE 50 UG/1
50 SPRAY, METERED NASAL DAILY
Qty: 1 | Refills: 1 | Status: ACTIVE | COMMUNITY
Start: 2023-07-05 | End: 1900-01-01

## 2023-07-05 NOTE — ASSESSMENT
[FreeTextEntry1] : 1. AS - montelukast and albuerol\par \par 2. Chronic cough - likely GERD - She is on omprazole already, recommend ENT eval and pulmonary eval

## 2023-07-05 NOTE — REASON FOR VISIT
[Initial Consultation] : an initial consultation for [Family Member] : family member [FreeTextEntry2] : cough.

## 2023-07-05 NOTE — HISTORY OF PRESENT ILLNESS
[de-identified] : ALDO DYKES is a 77 year yo female who presents here today with her son who is translating for her.  She is here for a cough and states she has asthma for over 40 years.  Pt is being treated by Dr Glenroy Hutchins for asthma sxs.  She states she takes an inhaler every morning but does not know the name of the medication.  Per pt her cough is all the time, even throughout the night. \par \par She complains of cough for 2 years. She haa some stuffiness in the nose.

## 2023-07-05 NOTE — REVIEW OF SYSTEMS
[Cough] : cough [Nl] : Genitourinary [Immunizations are up to date] : Immunizations are up to date [Received Influenza Vaccine this Past Year] : Patient has received the Influenza vaccine this past year [COVID-19 Vaccination Complete] : COVID-19 vaccination complete

## 2023-07-24 ENCOUNTER — APPOINTMENT (OUTPATIENT)
Dept: ORTHOPEDIC SURGERY | Facility: CLINIC | Age: 78
End: 2023-07-24
Payer: MEDICAID

## 2023-07-24 DIAGNOSIS — M25.562 PAIN IN LEFT KNEE: ICD-10-CM

## 2023-07-24 DIAGNOSIS — M25.561 PAIN IN RIGHT KNEE: ICD-10-CM

## 2023-07-24 PROCEDURE — 73564 X-RAY EXAM KNEE 4 OR MORE: CPT | Mod: LT,RT

## 2023-07-24 PROCEDURE — 99214 OFFICE O/P EST MOD 30 MIN: CPT

## 2023-07-24 NOTE — HISTORY OF PRESENT ILLNESS
[de-identified] : 77 year old female presents to the office today for a follow up on her bilateral arthritic knees. Patient was last seen in January and received gel injections which she states gave her great relief. Patient states that her pain is now returning. She reports swelling and buckling. Patient reports difficulty with ambulation and negotiating stairs. Patient is here today to discuss her next options for pain relief.

## 2023-07-24 NOTE — PHYSICAL EXAM
[de-identified] : General appearance: well nourished and hydrated, pleasant, alert and oriented x 3, cooperative.\par Cardiovascular: no apparent abnormalities, no lower leg edema, no varicosities, pedal pulses are palpable.\par Neurologic: sensation is normal, no muscle weakness in upper or lower extremities\par Dermatologic no apparent skin lesions, moist, warm, no rash.\par Gait: nonantalgic.\par \par Left knee\par Inspection: no effusion or erythema.\par Wounds: none.\par Alignment: normal.\par Palpation: lateral joint line tenderness \par ROM:15 degree flexion contracture, flexes  degrees \par Ligamentous laxity: all ligaments appear stable\par Muscle Test: good quad strength.\par \par Right knee\par Inspection: no effusion or erythema.\par Wounds: none.\par Alignment: normal \par Palpation: medial joint line tenderness \par ROM: 0-110 degrees\par Ligamentous laxity: all ligaments appear stable\par Muscle Test: good quad strength. [de-identified] : Radiographs done today AP lateral and skyline of knees shows bone on bone in the PF joints bilaterally , right knee has 50 percent medial joint space narrowing and the left has 50 percent lateral joint space narrowing.

## 2023-07-24 NOTE — ASSESSMENT
[FreeTextEntry1] : 77 year old female presents to the office today for a follow up on her bilateral arthritic knees. Patient enjoyed a great result from Euflexxa injections given to her in January. Unfortunately, her pain is now returning. I believe that the patient would benefit from another series of gel injections. We will order the product and see her back in the office once the product is received.

## 2023-07-27 RX ORDER — HYALURONATE SODIUM 20 MG/2 ML
20 SYRINGE (ML) INTRAARTICULAR
Qty: 2 | Refills: 0 | Status: ACTIVE | COMMUNITY
Start: 2023-07-27 | End: 1900-01-01

## 2023-08-25 ENCOUNTER — APPOINTMENT (OUTPATIENT)
Dept: OBGYN | Facility: CLINIC | Age: 78
End: 2023-08-25
Payer: MEDICAID

## 2023-08-25 ENCOUNTER — OUTPATIENT (OUTPATIENT)
Dept: OUTPATIENT SERVICES | Facility: HOSPITAL | Age: 78
LOS: 1 days | End: 2023-08-25
Payer: MEDICAID

## 2023-08-25 VITALS — HEIGHT: 65 IN | WEIGHT: 234 LBS | BODY MASS INDEX: 38.99 KG/M2 | TEMPERATURE: 97.9 F

## 2023-08-25 DIAGNOSIS — Z01.419 ENCOUNTER FOR GYNECOLOGICAL EXAMINATION (GENERAL) (ROUTINE) WITHOUT ABNORMAL FINDINGS: ICD-10-CM

## 2023-08-25 DIAGNOSIS — N39.41 URGE INCONTINENCE: ICD-10-CM

## 2023-08-25 DIAGNOSIS — Z01.419 ENCOUNTER FOR GYNECOLOGICAL EXAMINATION (GENERAL) (ROUTINE) W/OUT ABNORMAL FINDINGS: ICD-10-CM

## 2023-08-25 DIAGNOSIS — Z90.49 ACQUIRED ABSENCE OF OTHER SPECIFIED PARTS OF DIGESTIVE TRACT: Chronic | ICD-10-CM

## 2023-08-25 DIAGNOSIS — Z90.10 ACQUIRED ABSENCE OF UNSPECIFIED BREAST AND NIPPLE: Chronic | ICD-10-CM

## 2023-08-25 DIAGNOSIS — Z98.51 TUBAL LIGATION STATUS: Chronic | ICD-10-CM

## 2023-08-25 DIAGNOSIS — Z98.890 OTHER SPECIFIED POSTPROCEDURAL STATES: Chronic | ICD-10-CM

## 2023-08-25 DIAGNOSIS — Z90.89 ACQUIRED ABSENCE OF OTHER ORGANS: Chronic | ICD-10-CM

## 2023-08-25 DIAGNOSIS — Z00.00 ENCOUNTER FOR GENERAL ADULT MEDICAL EXAMINATION W/OUT ABNORMAL FINDINGS: ICD-10-CM

## 2023-08-25 PROCEDURE — 99213 OFFICE O/P EST LOW 20 MIN: CPT | Mod: 25,GC

## 2023-08-25 PROCEDURE — 88142 CYTOPATH C/V THIN LAYER: CPT

## 2023-08-25 PROCEDURE — 87086 URINE CULTURE/COLONY COUNT: CPT

## 2023-08-25 PROCEDURE — 99213 OFFICE O/P EST LOW 20 MIN: CPT | Mod: 25

## 2023-08-25 PROCEDURE — 99387 INIT PM E/M NEW PAT 65+ YRS: CPT | Mod: GC

## 2023-08-25 NOTE — PHYSICAL EXAM
[Chaperone Present] : A chaperone was present in the examining room during all aspects of the physical examination [Appropriately responsive] : appropriately responsive [Alert] : alert [No Acute Distress] : no acute distress [No Lymphadenopathy] : no lymphadenopathy [Regular Rate Rhythm] : regular rate rhythm [No Murmurs] : no murmurs [Clear to Auscultation B/L] : clear to auscultation bilaterally [Soft] : soft [Non-tender] : non-tender [Non-distended] : non-distended [No HSM] : No HSM [No Lesions] : no lesions [No Mass] : no mass [Oriented x3] : oriented x3 [Right Breast Absent] : a total mastectomy [Left Breast Absent] : a total mastectomy [Labia Majora] : normal [Labia Minora] : normal [Normal] : normal

## 2023-08-26 PROBLEM — N39.41 URGE INCONTINENCE OF URINE: Status: ACTIVE | Noted: 2023-08-26

## 2023-08-26 PROBLEM — Z01.419 WELL WOMAN EXAM WITH ROUTINE GYNECOLOGICAL EXAM: Status: ACTIVE | Noted: 2023-08-26

## 2023-08-26 LAB
APPEARANCE: CLEAR
BILIRUBIN URINE: NEGATIVE
BLOOD URINE: NEGATIVE
COLOR: YELLOW
GLUCOSE QUALITATIVE U: NEGATIVE MG/DL
KETONES URINE: NEGATIVE MG/DL
LEUKOCYTE ESTERASE URINE: NEGATIVE
NITRITE URINE: NEGATIVE
PH URINE: 6.5
PROTEIN URINE: NEGATIVE MG/DL
SPECIFIC GRAVITY URINE: 1.01
UROBILINOGEN URINE: 0.2 MG/DL

## 2023-08-26 NOTE — HISTORY OF PRESENT ILLNESS
[FreeTextEntry1] :  ID: [id number]  76 y/o P3 postmenopausal female here to establish care. Last seen by gyn over a decade ago. H/o of breast cancer s/p double mastectomy and tamoxifen was told by her breast surgeon to be evaluated by gyn. Denies vaginal bleeding, discharge, malodor or vaginal pain. Not sexually active. Endorses incontinence and worsening nocturia. Feels like she completely voids. Cannot get to the bathroom on time.   PMHx - asthma, HTN, thyroid disorder. All well controlled, followed closely by PCP.  PSHx - cholecystectomy, thyroid sx, hernia repair, tonsillectomy Meds- levothyroxine, losartan, tamoxifen

## 2023-08-26 NOTE — PLAN
[FreeTextEntry1] : 78 y/o P3 postmenopausal sterling. exam with incontinence, urgency - Pap smear done today - Urogyn referral given - RTC in one year for annual or PRN

## 2023-08-28 ENCOUNTER — OUTPATIENT (OUTPATIENT)
Dept: OUTPATIENT SERVICES | Facility: HOSPITAL | Age: 78
LOS: 1 days | End: 2023-08-28

## 2023-08-28 DIAGNOSIS — Z98.890 OTHER SPECIFIED POSTPROCEDURAL STATES: Chronic | ICD-10-CM

## 2023-08-28 DIAGNOSIS — Z00.00 ENCOUNTER FOR GENERAL ADULT MEDICAL EXAMINATION WITHOUT ABNORMAL FINDINGS: ICD-10-CM

## 2023-08-28 DIAGNOSIS — Z90.89 ACQUIRED ABSENCE OF OTHER ORGANS: Chronic | ICD-10-CM

## 2023-08-28 DIAGNOSIS — Z98.51 TUBAL LIGATION STATUS: Chronic | ICD-10-CM

## 2023-08-28 DIAGNOSIS — Z90.49 ACQUIRED ABSENCE OF OTHER SPECIFIED PARTS OF DIGESTIVE TRACT: Chronic | ICD-10-CM

## 2023-08-28 DIAGNOSIS — Z90.10 ACQUIRED ABSENCE OF UNSPECIFIED BREAST AND NIPPLE: Chronic | ICD-10-CM

## 2023-08-28 LAB — BACTERIA UR CULT: NORMAL

## 2023-08-29 DIAGNOSIS — Z00.00 ENCOUNTER FOR GENERAL ADULT MEDICAL EXAMINATION WITHOUT ABNORMAL FINDINGS: ICD-10-CM

## 2023-08-30 LAB — CYTOLOGY CVX/VAG DOC THIN PREP: NORMAL

## 2023-08-31 DIAGNOSIS — N39.41 URGE INCONTINENCE: ICD-10-CM

## 2023-08-31 DIAGNOSIS — Z00.00 ENCOUNTER FOR GENERAL ADULT MEDICAL EXAMINATION WITHOUT ABNORMAL FINDINGS: ICD-10-CM

## 2023-08-31 DIAGNOSIS — Z01.419 ENCOUNTER FOR GYNECOLOGICAL EXAMINATION (GENERAL) (ROUTINE) WITHOUT ABNORMAL FINDINGS: ICD-10-CM

## 2023-09-11 ENCOUNTER — APPOINTMENT (OUTPATIENT)
Dept: PULMONOLOGY | Facility: CLINIC | Age: 78
End: 2023-09-11

## 2023-09-12 ENCOUNTER — APPOINTMENT (OUTPATIENT)
Dept: ORTHOPEDIC SURGERY | Facility: CLINIC | Age: 78
End: 2023-09-12

## 2023-09-18 ENCOUNTER — APPOINTMENT (OUTPATIENT)
Dept: ORTHOPEDIC SURGERY | Facility: CLINIC | Age: 78
End: 2023-09-18
Payer: MEDICAID

## 2023-09-18 VITALS — BODY MASS INDEX: 38.99 KG/M2 | WEIGHT: 234 LBS | HEIGHT: 65 IN

## 2023-09-18 PROCEDURE — 99213 OFFICE O/P EST LOW 20 MIN: CPT | Mod: 25

## 2023-09-18 PROCEDURE — 20611 DRAIN/INJ JOINT/BURSA W/US: CPT | Mod: 50

## 2023-09-29 ENCOUNTER — NON-APPOINTMENT (OUTPATIENT)
Age: 78
End: 2023-09-29

## 2023-10-04 ENCOUNTER — APPOINTMENT (OUTPATIENT)
Dept: OTOLARYNGOLOGY | Facility: CLINIC | Age: 78
End: 2023-10-04

## 2023-10-30 ENCOUNTER — APPOINTMENT (OUTPATIENT)
Dept: ORTHOPEDIC SURGERY | Facility: CLINIC | Age: 78
End: 2023-10-30

## 2023-11-03 ENCOUNTER — RX RENEWAL (OUTPATIENT)
Age: 78
End: 2023-11-03

## 2023-11-06 ENCOUNTER — APPOINTMENT (OUTPATIENT)
Dept: ORTHOPEDIC SURGERY | Facility: CLINIC | Age: 78
End: 2023-11-06

## 2023-11-13 ENCOUNTER — APPOINTMENT (OUTPATIENT)
Dept: ORTHOPEDIC SURGERY | Facility: CLINIC | Age: 78
End: 2023-11-13

## 2023-11-27 ENCOUNTER — APPOINTMENT (OUTPATIENT)
Dept: ORTHOPEDIC SURGERY | Facility: CLINIC | Age: 78
End: 2023-11-27

## 2023-12-04 ENCOUNTER — APPOINTMENT (OUTPATIENT)
Dept: ORTHOPEDIC SURGERY | Facility: CLINIC | Age: 78
End: 2023-12-04
Payer: MEDICAID

## 2023-12-04 PROCEDURE — 99213 OFFICE O/P EST LOW 20 MIN: CPT | Mod: 25

## 2023-12-04 PROCEDURE — 20611 DRAIN/INJ JOINT/BURSA W/US: CPT | Mod: 50

## 2023-12-04 RX ORDER — TAMOXIFEN CITRATE 20 MG/1
20 TABLET, FILM COATED ORAL DAILY
Qty: 90 | Refills: 1 | Status: ACTIVE | COMMUNITY
Start: 2019-11-18 | End: 1900-01-01

## 2023-12-11 ENCOUNTER — APPOINTMENT (OUTPATIENT)
Dept: ORTHOPEDIC SURGERY | Facility: CLINIC | Age: 78
End: 2023-12-11

## 2023-12-14 ENCOUNTER — APPOINTMENT (OUTPATIENT)
Dept: HEMATOLOGY ONCOLOGY | Facility: CLINIC | Age: 78
End: 2023-12-14

## 2023-12-28 NOTE — ED ADULT TRIAGE NOTE - SPO2 (%)
Thank you for allowing us to be a part of your care today. We strive to provide the best care for you today and in the future. Here are a few important reminders:   Our goal is to review and report all test/imaging results within 24-48 hours (unless otherwise specified by the clinician). If you have not received your test results within this time period, please contact the clinic at [phone] and ask to speak to a RN Specialist or Cancer Care Coordinator.   You can also receive your test results on-line quickly and easily by enrolling in [facility] by visiting [email or web address].  Central Scheduling should contact you in 24-48 hours if any imaging is ordered during this visit. Please contact Central Scheduling at [phone] in case you do not receive a call.   Due to the recent change in narcotic laws and our commitment to patient safety and well-being, narcotic refills will be evaluated on a strict case by case basis. We will not address any requests for re-fills after noon on Fridays.   If you have paperwork, complete your portion of the paperwork and either drop off or fax the forms to [phone]. Make sure to leave clear instruction of where you would like the completed paperwork to go and include a valid phone number. It may take staff up to 7 days to complete.   99

## 2024-02-01 ENCOUNTER — APPOINTMENT (OUTPATIENT)
Dept: OBGYN | Facility: CLINIC | Age: 79
End: 2024-02-01

## 2024-02-12 ENCOUNTER — RX RENEWAL (OUTPATIENT)
Age: 79
End: 2024-02-12

## 2024-03-01 ENCOUNTER — OUTPATIENT (OUTPATIENT)
Dept: OUTPATIENT SERVICES | Facility: HOSPITAL | Age: 79
LOS: 1 days | End: 2024-03-01
Payer: MEDICAID

## 2024-03-01 ENCOUNTER — APPOINTMENT (OUTPATIENT)
Dept: OBGYN | Facility: CLINIC | Age: 79
End: 2024-03-01
Payer: MEDICAID

## 2024-03-01 VITALS
WEIGHT: 243 LBS | HEIGHT: 65 IN | BODY MASS INDEX: 40.48 KG/M2 | DIASTOLIC BLOOD PRESSURE: 70 MMHG | SYSTOLIC BLOOD PRESSURE: 120 MMHG

## 2024-03-01 DIAGNOSIS — Z98.51 TUBAL LIGATION STATUS: Chronic | ICD-10-CM

## 2024-03-01 DIAGNOSIS — Z90.49 ACQUIRED ABSENCE OF OTHER SPECIFIED PARTS OF DIGESTIVE TRACT: Chronic | ICD-10-CM

## 2024-03-01 DIAGNOSIS — Z90.89 ACQUIRED ABSENCE OF OTHER ORGANS: Chronic | ICD-10-CM

## 2024-03-01 DIAGNOSIS — Z90.10 ACQUIRED ABSENCE OF UNSPECIFIED BREAST AND NIPPLE: Chronic | ICD-10-CM

## 2024-03-01 DIAGNOSIS — Z98.890 OTHER SPECIFIED POSTPROCEDURAL STATES: Chronic | ICD-10-CM

## 2024-03-01 DIAGNOSIS — Z01.419 ENCOUNTER FOR GYNECOLOGICAL EXAMINATION (GENERAL) (ROUTINE) WITHOUT ABNORMAL FINDINGS: ICD-10-CM

## 2024-03-01 PROCEDURE — 58100 BIOPSY OF UTERUS LINING: CPT

## 2024-03-01 PROCEDURE — 99212 OFFICE O/P EST SF 10 MIN: CPT | Mod: 25

## 2024-03-01 PROCEDURE — T1013: CPT

## 2024-03-01 PROCEDURE — 88305 TISSUE EXAM BY PATHOLOGIST: CPT

## 2024-03-04 ENCOUNTER — APPOINTMENT (OUTPATIENT)
Dept: BREAST CENTER | Facility: CLINIC | Age: 79
End: 2024-03-04

## 2024-03-04 DIAGNOSIS — N95.0 POSTMENOPAUSAL BLEEDING: ICD-10-CM

## 2024-03-04 NOTE — HISTORY OF PRESENT ILLNESS
[FreeTextEntry1] : Patient is a 77F with history of right breast IDC (+/+/-) s/p bilateral mastectomies with SLNB on 9/18/2019. Final path: Left 0/1 (-); Left hyalinized fibroadenoma.  Right 0/1; Right breast two healing prior biopsy sites in the UIQ with invasive moderately differentiated ductal carcinoma, 22 mm associated with calcifications and DCIS,with no definitive foci of LVI.  pT2, p(sn)N0, pMx.  Oncotype DX RS 6 On Tamoxifen with Dr. Hartley.   Her family history is significant for her sister with breast cancer at 68; her maternal aunt with breast cancer at 65; and her niece (sister's daughter) with breast cancer at 54.    Her workup was as follows: Patient with right breast invasive well to moderately differentiated ductal carcinoma on ultrasound guided core biopsy 6/12/19; 2:00 N8, 19 mm (cork).  (+/+/-)  Right breast invasive well to moderately differentiated ductal carcinoma on ultrasound guided core biopsy 6/12/19; 2:00 N10, 10 mm (tophat).   (+/+/-)  Left breast fibroadenoma on ultrasound guided core biopsy 6/12/19; 1:00 N5, (hourglass).  Findings are benign concordant and short term follow up left breast ultrasound recommended in six months.   Bilateral Breast MRI 7/26/19: 5 cm area of clumped non mass enhancement in the lateral aspect of the left breast; MR guided core biopsy recommended.  Per patient and her son, she prefers bilateral mastectomy and therefore biopsy was not indicated.   9/18/19: bilateral simple mastectomy and SLNB  Left 0/1 (-); Left hyalinized fibroadenoma.  Right 0/1; Right breast two healing prior biopsy sites in the UIQ with invasive moderately differentiated ductal carcinoma, 22 mm associated with calcifications and DCIS, cribiform and micropapillary types, intermediate nuclear grade; with no definitive foci of LVI.  AJCC 8th Edition Pathologic Stage: pT2, p(sn)N0, pMx.  Oncotype DX RS 6 On Tamoxifen with Dr. Laws    Status post bilateral chest wall revision 2/19/20 demonstrating left benign skin and subcutaneous adipose tissue with dermal scar, 143 grams, and right benign skin and subcutaneous adipose tissue with small seborrheic keratosis, dermal scar, 274 grams.    She was last seen in clinic in 2/2022 and recommended an annual follow up.  She had b/l US 3/28/23 which was deemed birads1

## 2024-03-06 NOTE — HISTORY OF PRESENT ILLNESS
[Did you have an operation on your burn/wound injury?] : Did you have an operation on your burn/wound injury? No [Did this injury occur on the job?] : Did this injury occur on the job? No [de-identified] : right lower leg wound with infection 5 months ago -.> admitted for iv abx Chart(s)/Patient [de-identified] : healing

## 2024-03-08 LAB — CORE LAB BIOPSY: NORMAL

## 2024-03-22 ENCOUNTER — OUTPATIENT (OUTPATIENT)
Dept: OUTPATIENT SERVICES | Facility: HOSPITAL | Age: 79
LOS: 1 days | End: 2024-03-22
Payer: MEDICAID

## 2024-03-22 ENCOUNTER — APPOINTMENT (OUTPATIENT)
Dept: OBGYN | Facility: CLINIC | Age: 79
End: 2024-03-22

## 2024-03-22 DIAGNOSIS — Z90.49 ACQUIRED ABSENCE OF OTHER SPECIFIED PARTS OF DIGESTIVE TRACT: Chronic | ICD-10-CM

## 2024-03-22 DIAGNOSIS — N95.0 POSTMENOPAUSAL BLEEDING: ICD-10-CM

## 2024-03-22 DIAGNOSIS — Z98.51 TUBAL LIGATION STATUS: Chronic | ICD-10-CM

## 2024-03-22 DIAGNOSIS — Z00.00 ENCOUNTER FOR GENERAL ADULT MEDICAL EXAMINATION WITHOUT ABNORMAL FINDINGS: ICD-10-CM

## 2024-03-22 DIAGNOSIS — Z90.10 ACQUIRED ABSENCE OF UNSPECIFIED BREAST AND NIPPLE: Chronic | ICD-10-CM

## 2024-03-22 DIAGNOSIS — Z98.890 OTHER SPECIFIED POSTPROCEDURAL STATES: Chronic | ICD-10-CM

## 2024-03-22 DIAGNOSIS — Z90.89 ACQUIRED ABSENCE OF OTHER ORGANS: Chronic | ICD-10-CM

## 2024-03-22 PROCEDURE — ZZZZZ: CPT

## 2024-03-22 PROCEDURE — 99213 OFFICE O/P EST LOW 20 MIN: CPT

## 2024-03-25 ENCOUNTER — APPOINTMENT (OUTPATIENT)
Dept: ANTEPARTUM | Facility: CLINIC | Age: 79
End: 2024-03-25

## 2024-03-27 NOTE — DISCHARGE NOTE ADULT - NSFTFHOMEHTHYNRD_GEN_ALL_CORE
COLONOSCOPY Procedure Report    Patient Name:  Viktor Atkins  YOB: 1952    Date of Surgery:  3/27/2024     Pre-Op Diagnosis:  Rectal bleeding [K62.5]       Post Op Diagnosis:  Colon Polyps x 18 and colon mass, diverticulosis, internal hemorrhoids      Procedure/CPT® Codes:      Procedure(s):  COLONOSCOPY with hot and cold snare polypectomy x18 and sigmoid mass biopsies with endscoping spot tattooing    Staff:  Surgeon(s):  Fili Quintero MD         Anesthesia: Monitored Anesthesia Care    Implants:    Nothing was implanted during the procedure    Specimen:        See below    No blood loss    Complications:  None    Description of Procedure:  Informed consent was obtained for the procedure, including sedation.  Risks of perforation, hemorrhage, adverse drug reaction and aspiration were discussed.  The patient was brought into the endoscopy suite. Continuous cardiopulmonary monitoring was performed.  The patient was placed in the left lateral decubitus position. After adequate sedation was attained, the digital rectal exam was performed which was normal.  Subsequently, the Olympus colonoscope was inserted into the patient's rectum and advanced to the level of the cecum and terminal ileum without difficulty.  The bowel prep was good.  Circumferential examination of the patient's colon was performed on scope withdrawal.  A retroflex exam was performed in the rectum which showed small internal hemorrhoids.  The bowel was decompressed, the scope was withdrawn from the patient, and the patient tolerated the procedure well. There were no immediate post-operative complications.     Findings:    Normal mucosa the terminal ileum  Ascending colon polyps x 4, 5 to 10 mm in size, removed in single piece fashion cold snare polypectomy  Transverse colon polyps x 8, 5 to 14 mm in size, removed in single piece fashion with cold snare polypectomy  Sigmoid colon polyps x 5, 10 to 18 mm in size, removed in  single piece fashion with cold and hot snare polypectomy  Rectal polyp x 1, 3 cm in size, removed in piecemeal fashion with hot snare polypectomy  Sigmoid colon mass, ulcerative, with proximal margins 25 cm from the anal verge.  It is approximately 4 cm in size.  Multiple biopsies were obtained.  The scleral needle was utilized to inject spot tattoo at the distal margin.  Mild sigmoid diverticulosis  Small internal hemorrhoids    Impression:  Suspect rectal bleeding secondary to distal sigmoid colon mass.  CT abdomen pelvis on admission with contrast without evidence of metastatic disease.  Colon polyps x 18  Mild sigmoid diverticulosis  Internal hemorrhoids    New onset Afib - appreciate Dr. Greenberg's assistance    Recommendations:  Follow-up histopathology  Okay for clear liquid diet  Will consult general surgery  Will check a CEA        Fili Quintero MD     Date: 3/27/2024  Time: 13:28 EDT           Yes

## 2024-03-28 PROBLEM — N95.0 POSTMENOPAUSAL BLEEDING: Status: ACTIVE | Noted: 2024-01-26

## 2024-03-29 DIAGNOSIS — N95.0 POSTMENOPAUSAL BLEEDING: ICD-10-CM

## 2024-04-16 ENCOUNTER — APPOINTMENT (OUTPATIENT)
Dept: ORTHOPEDIC SURGERY | Facility: CLINIC | Age: 79
End: 2024-04-16
Payer: MEDICAID

## 2024-04-16 PROCEDURE — 99213 OFFICE O/P EST LOW 20 MIN: CPT

## 2024-04-16 NOTE — DISCUSSION/SUMMARY
[de-identified] : Discussed prior bilateral knee x-rays in detail with patient showing advanced testing there is bilateral knees, left knee being worse than the right.  Discussed treatment options including conservative or surgical.  Patient interested in total knee replacement.  Discussed the importance of weight loss prior to surgery.  Patient will follow-up in a few months for reevaluation and surgical consult with Dr. Omega Richardson.  Call with any questions or concerns.

## 2024-04-16 NOTE — IMAGING
[de-identified] : Bilateral knee exam: Mild effusion, no ecchymosis, erythema or tenderness palpation to joint line, range of motion 0 degrees to 100 degrees with discomfort/guarding, neurovascular intact, no gross instability

## 2024-04-16 NOTE — HISTORY OF PRESENT ILLNESS
[de-identified] : Patient is a 78-year-old female here for evaluation bilateral knees.  Patient has history of bilateral knee osteoarthritis.  Patient was seen in December and had a cortisone injection to bilateral knees.  Patient states that injection did not help at all.  Denies recent injury/trauma.

## 2024-05-10 ENCOUNTER — RX RENEWAL (OUTPATIENT)
Age: 79
End: 2024-05-10

## 2024-05-10 RX ORDER — CETIRIZINE HYDROCHLORIDE 10 MG/1
10 TABLET, COATED ORAL DAILY
Qty: 90 | Refills: 0 | Status: ACTIVE | COMMUNITY
Start: 2023-07-05 | End: 1900-01-01

## 2024-05-14 NOTE — ASU PATIENT PROFILE, ADULT - PAIN DESCRIPTION (FREQUENCY/QUALITY), PROFILE
indep eval  hurt foot playing soccer  [employer] 5[employer]  + imaging and avulsion fx   splint and outpt follow up  agree w care
soreness/radiating

## 2024-05-28 ENCOUNTER — APPOINTMENT (OUTPATIENT)
Dept: RHEUMATOLOGY | Facility: CLINIC | Age: 79
End: 2024-05-28
Payer: MEDICAID

## 2024-05-28 VITALS
DIASTOLIC BLOOD PRESSURE: 76 MMHG | WEIGHT: 240.5 LBS | TEMPERATURE: 98.8 F | OXYGEN SATURATION: 93 % | BODY MASS INDEX: 40.07 KG/M2 | HEIGHT: 65 IN | SYSTOLIC BLOOD PRESSURE: 128 MMHG | HEART RATE: 79 BPM

## 2024-05-28 DIAGNOSIS — M25.552 PAIN IN RIGHT HIP: ICD-10-CM

## 2024-05-28 DIAGNOSIS — M54.2 CERVICALGIA: ICD-10-CM

## 2024-05-28 DIAGNOSIS — M79.642 PAIN IN RIGHT HAND: ICD-10-CM

## 2024-05-28 DIAGNOSIS — M54.50 LOW BACK PAIN, UNSPECIFIED: ICD-10-CM

## 2024-05-28 DIAGNOSIS — G89.29 PAIN IN RIGHT SHOULDER: ICD-10-CM

## 2024-05-28 DIAGNOSIS — M79.641 PAIN IN RIGHT HAND: ICD-10-CM

## 2024-05-28 DIAGNOSIS — M25.551 PAIN IN RIGHT HIP: ICD-10-CM

## 2024-05-28 DIAGNOSIS — M25.512 PAIN IN RIGHT SHOULDER: ICD-10-CM

## 2024-05-28 DIAGNOSIS — R20.0 ANESTHESIA OF SKIN: ICD-10-CM

## 2024-05-28 DIAGNOSIS — M25.511 PAIN IN RIGHT SHOULDER: ICD-10-CM

## 2024-05-28 DIAGNOSIS — G89.29 PAIN IN RIGHT HIP: ICD-10-CM

## 2024-05-28 PROCEDURE — 99204 OFFICE O/P NEW MOD 45 MIN: CPT

## 2024-05-28 RX ORDER — ASPIRIN 81 MG/1
81 TABLET ORAL
Refills: 0 | Status: ACTIVE | COMMUNITY

## 2024-05-28 RX ORDER — BENZOCAINE/BENZALKONIUM/ALOE/E 5 %-0.13 %
10 CREAM (GRAM) TOPICAL
Refills: 0 | Status: ACTIVE | COMMUNITY

## 2024-05-28 NOTE — REASON FOR VISIT
[Initial Evaluation] : an initial evaluation [FreeTextEntry1] : Musculoskeletal pain x more than 10 years - Monegasque speaking

## 2024-05-28 NOTE — HISTORY OF PRESENT ILLNESS
[FreeTextEntry1] : Pt has had more than 10 years of worsening pain throughout the body, worse at night after 3 AM, with a pinching sensation, numbness and L>R foot swelling. She can't stand for a long time because of low back pain. Also + pain in the L side of the face and the R TMJ. Her wrists become swollen with pain around the CMCs. She saw her PMD for these symptoms and was started on Lyrica approx 1 month ago, initially 75 mg but it wasn't helping, her dose was then increased to 100 mg BID but it is not helping either and it is causing dizziness and drowsiness. + Difficulty sleeping. Pt also saw orthopedics and had knee x-rays which demonstrated advanced OA, she is now going for evaluation for b/l knee replacements. + RLE ulceration for the past year which has slowly been healing.  Physical exam: GEN: Pleasant, AAO woman sitting on exam table in NAD SKIN: + Superficial ulceration with scab and venous stasis changes noted on R shin MOUTH: Dry mucous membranes, + dentures ENT: no LAD PULM: Clear to auscultation b/l CV: Regular rate and rhythm, no murmurs MSK: Neck: Full ROM, + pain with flexion and extension Shoulders: Full ROM b/l, + pain with ROM Elbows: Full ROM b/l, no effusions Wrists: + Pain with ROM on R Hands: No synovitis Hips; Full ROM b/l Knees: No effusions, slightly limited extension on R to 170 degrees, limited exam on L due to pt pain Ankles: + Trace edema b/l, full ROM b/l Feet: + Trace edema b/l

## 2024-05-29 LAB
BASOPHILS # BLD AUTO: 0.03 K/UL
BASOPHILS NFR BLD AUTO: 0.5 %
CK SERPL-CCNC: 52 U/L
EOSINOPHIL # BLD AUTO: 0.18 K/UL
EOSINOPHIL NFR BLD AUTO: 2.8 %
ERYTHROCYTE [SEDIMENTATION RATE] IN BLOOD BY WESTERGREN METHOD: 5 MM/HR
HCT VFR BLD CALC: 39.5 %
HGB BLD-MCNC: 12.9 G/DL
IMM GRANULOCYTES NFR BLD AUTO: 0.8 %
LYMPHOCYTES # BLD AUTO: 2.19 K/UL
LYMPHOCYTES NFR BLD AUTO: 34 %
MAN DIFF?: NORMAL
MCHC RBC-ENTMCNC: 29.7 PG
MCHC RBC-ENTMCNC: 32.7 G/DL
MCV RBC AUTO: 90.8 FL
MONOCYTES # BLD AUTO: 0.55 K/UL
MONOCYTES NFR BLD AUTO: 8.5 %
NEUTROPHILS # BLD AUTO: 3.44 K/UL
NEUTROPHILS NFR BLD AUTO: 53.4 %
PLATELET # BLD AUTO: 253 K/UL
PMV BLD AUTO: 0 /100 WBCS
RBC # BLD: 4.35 M/UL
RBC # FLD: 13.6 %
T4 FREE SERPL-MCNC: 1.7 NG/DL
TSH SERPL-ACNC: 1.1 UIU/ML
WBC # FLD AUTO: 6.44 K/UL

## 2024-05-30 LAB
ALBUMIN SERPL ELPH-MCNC: 4.3 G/DL
ALP BLD-CCNC: 39 U/L
ALT SERPL-CCNC: 7 U/L
ANION GAP SERPL CALC-SCNC: 9 MMOL/L
AST SERPL-CCNC: 15 U/L
BILIRUB SERPL-MCNC: 0.4 MG/DL
BUN SERPL-MCNC: 11 MG/DL
C3 SERPL-MCNC: 103 MG/DL
C4 SERPL-MCNC: 14 MG/DL
CALCIUM SERPL-MCNC: 9.2 MG/DL
CCP AB SER IA-ACNC: <8 UNITS
CHLORIDE SERPL-SCNC: 103 MMOL/L
CO2 SERPL-SCNC: 26 MMOL/L
CREAT SERPL-MCNC: 0.9 MG/DL
CRP SERPL-MCNC: <3 MG/L
DSDNA AB SER-ACNC: 1 IU/ML
EGFR: 65 ML/MIN/1.73M2
ENA RNP AB SER IA-ACNC: <0.2 AL
ENA SM AB SER IA-ACNC: <0.2 AL
ENA SS-A AB SER IA-ACNC: >8 AL
ENA SS-B AB SER IA-ACNC: <0.2 AL
FOLATE SERPL-MCNC: >20 NG/ML
GLUCOSE SERPL-MCNC: 91 MG/DL
POTASSIUM SERPL-SCNC: 4.5 MMOL/L
PROT SERPL-MCNC: 6.4 G/DL
RF+CCP IGG SER-IMP: NEGATIVE
RHEUMATOID FACT SER QL: 17 IU/ML
SODIUM SERPL-SCNC: 138 MMOL/L
VIT B12 SERPL-MCNC: 465 PG/ML

## 2024-06-01 LAB
ANA PAT FLD IF-IMP: ABNORMAL
ANA PATTERN: ABNORMAL
ANA SER IF-ACNC: ABNORMAL
ANA TITER: ABNORMAL

## 2024-06-21 ENCOUNTER — APPOINTMENT (OUTPATIENT)
Dept: ORTHOPEDIC SURGERY | Facility: CLINIC | Age: 79
End: 2024-06-21

## 2024-06-27 ENCOUNTER — APPOINTMENT (OUTPATIENT)
Dept: BREAST CENTER | Facility: CLINIC | Age: 79
End: 2024-06-27
Payer: MEDICAID

## 2024-06-27 VITALS
WEIGHT: 240 LBS | HEART RATE: 80 BPM | HEIGHT: 65 IN | DIASTOLIC BLOOD PRESSURE: 74 MMHG | BODY MASS INDEX: 39.99 KG/M2 | SYSTOLIC BLOOD PRESSURE: 138 MMHG

## 2024-06-27 DIAGNOSIS — C50.411 MALIGNANT NEOPLASM OF UPPER-OUTER QUADRANT OF RIGHT FEMALE BREAST: ICD-10-CM

## 2024-06-27 PROCEDURE — 99213 OFFICE O/P EST LOW 20 MIN: CPT

## 2024-07-01 ENCOUNTER — APPOINTMENT (OUTPATIENT)
Dept: ORTHOPEDIC SURGERY | Facility: CLINIC | Age: 79
End: 2024-07-01

## 2024-07-01 VITALS — HEIGHT: 65 IN | WEIGHT: 240 LBS | BODY MASS INDEX: 39.99 KG/M2

## 2024-07-01 DIAGNOSIS — M17.12 UNILATERAL PRIMARY OSTEOARTHRITIS, LEFT KNEE: ICD-10-CM

## 2024-07-01 DIAGNOSIS — L08.9 LOCAL INFECTION OF THE SKIN AND SUBCUTANEOUS TISSUE, UNSPECIFIED: ICD-10-CM

## 2024-07-01 DIAGNOSIS — E66.09 OTHER OBESITY DUE TO EXCESS CALORIES: ICD-10-CM

## 2024-07-01 DIAGNOSIS — L97.211 VENOUS INSUFFICIENCY (CHRONIC) (PERIPHERAL): ICD-10-CM

## 2024-07-01 DIAGNOSIS — I87.2 VENOUS INSUFFICIENCY (CHRONIC) (PERIPHERAL): ICD-10-CM

## 2024-07-01 DIAGNOSIS — M17.11 UNILATERAL PRIMARY OSTEOARTHRITIS, RIGHT KNEE: ICD-10-CM

## 2024-07-01 PROCEDURE — 99203 OFFICE O/P NEW LOW 30 MIN: CPT | Mod: 25

## 2024-07-01 PROCEDURE — 73564 X-RAY EXAM KNEE 4 OR MORE: CPT | Mod: LT,RT

## 2024-07-03 NOTE — PATIENT PROFILE ADULT - NSASFALLNEEDSASSISTWITH_GEN_A_NUR
Detail Level: Generalized Sunscreen Recommendation Label Override: Broad Spectrum Sunscreen Sunscreen Recommendations: Suggest regular use of sunscreen SPF 30+ Sunscreen Recommendations: Suggest regular use of sunscreen SPF 30 Detail Level: Zone Sunscreen Recommendation Label Override: Sunscreen walking/standing

## 2024-07-12 ENCOUNTER — APPOINTMENT (OUTPATIENT)
Dept: RHEUMATOLOGY | Facility: CLINIC | Age: 79
End: 2024-07-12

## 2024-07-17 VITALS — WEIGHT: 230 LBS | BODY MASS INDEX: 40.75 KG/M2 | HEIGHT: 63 IN

## 2024-07-24 ENCOUNTER — APPOINTMENT (OUTPATIENT)
Dept: SURGERY | Facility: CLINIC | Age: 79
End: 2024-07-24

## 2024-07-31 ENCOUNTER — APPOINTMENT (OUTPATIENT)
Dept: RHEUMATOLOGY | Facility: CLINIC | Age: 79
End: 2024-07-31
Payer: MEDICAID

## 2024-07-31 VITALS
HEART RATE: 70 BPM | BODY MASS INDEX: 40.57 KG/M2 | SYSTOLIC BLOOD PRESSURE: 172 MMHG | HEIGHT: 63 IN | DIASTOLIC BLOOD PRESSURE: 95 MMHG | TEMPERATURE: 98.2 F | WEIGHT: 229 LBS

## 2024-07-31 DIAGNOSIS — I27.20 PULMONARY HYPERTENSION, UNSPECIFIED: ICD-10-CM

## 2024-07-31 DIAGNOSIS — M35.0C SJOGREN SYNDROME WITH DENTAL INVOLVEMENT: ICD-10-CM

## 2024-07-31 DIAGNOSIS — M79.7 FIBROMYALGIA: ICD-10-CM

## 2024-07-31 PROCEDURE — 99214 OFFICE O/P EST MOD 30 MIN: CPT

## 2024-07-31 RX ORDER — GABAPENTIN 300 MG/1
300 CAPSULE ORAL
Qty: 90 | Refills: 3 | Status: ACTIVE | COMMUNITY
Start: 2024-07-31 | End: 1900-01-01

## 2024-07-31 RX ORDER — PILOCARPINE HYDROCHLORIDE 5 MG/1
5 TABLET, FILM COATED ORAL
Qty: 180 | Refills: 0 | Status: ACTIVE | COMMUNITY
Start: 2024-07-31 | End: 1900-01-01

## 2024-07-31 NOTE — ASSESSMENT
[FreeTextEntry1] : Sjogren's syndrome: Pt had labs with EVENS 1:1280, Ro>8, has sicca symptoms and exam findings of dry mouth. Also with centromere pattern on EVENS, pt does not have exam e/o systemic sclerosis currently. Also with chronic diffuse pain, suspect that she also has fibromyalgia. s/p x-rays of b/l hips demonstrating mild L OA, x-rays of b/l hands demonstrating mild-moderate OA in the hands and wrists, c-spine x-rays with moderate OA, L-spine with severe OA, shoulder x-rays with bilateral mild GH and AC OA, pt also previously had knee x-rays with advanced OA. - Continue gabapentin 300 mg qhs, pt says that she is happy with this regimen for her pain - Start pilocarpine 5 mg BID for sicca symptoms - Start Biotene lozenges for sicca symptoms - Referred for TTE to rule out pulmonary hypertension - Would consider trial of hydroxychloroquine if pt's sicca symptoms do not improve with pilocarpine  f/u in 2 months

## 2024-07-31 NOTE — HISTORY OF PRESENT ILLNESS
[FreeTextEntry1] : Pt reports extremely dry mouth and throat. Also has cramps in the hands and LEs, and pain distal to her knees with prolonged walking. + Some SOB with prolonged walking. Pt saw Dr. Brambila for her knee pain, was advised to lose weight and see vascular prior to consideration for arthroplasty. Pt takes gabapentin 300 mg at bedtime, feels that it is helping her. Unclear what happened with the Lyrica- she doesn't recall taking it?   Previous HPI: Pt has had more than 10 years of worsening pain throughout the body, worse at night after 3 AM, with a pinching sensation, numbness and L>R foot swelling. She can't stand for a long time because of low back pain. Also + pain in the L side of the face and the R TMJ. Her wrists become swollen with pain around the CMCs. She saw her PMD for these symptoms and was started on Lyrica approx 1 month ago, initially 75 mg but it wasn't helping, her dose was then increased to 100 mg BID but it is not helping either and it is causing dizziness and drowsiness. + Difficulty sleeping. Pt also saw orthopedics and had knee x-rays which demonstrated advanced OA, she is now going for evaluation for b/l knee replacements. + RLE ulceration for the past year which has slowly been healing.  Physical exam: GEN: Pleasant, AAO woman sitting on exam table in NAD SKIN: + Superficial ulceration with scab and venous stasis changes noted on R shin MOUTH: Dry mucous membranes, + dentures, normal oral aperture ENT: no LAD PULM: Clear to auscultation b/l CV: Regular rate and rhythm, no murmurs MSK: Neck: Full ROM, + pain with flexion and extension Shoulders: Full ROM b/l, + pain with ROM Elbows: Full ROM b/l, no effusions Wrists: + Pain with ROM on R Hands: No synovitis Hips; Full ROM b/l Knees: No effusions, slightly limited extension on R to 170 degrees, limited exam on L due to pt pain Ankles: + Trace edema b/l, full ROM b/l Feet: + Trace edema b/l EXT: Venous stasis changes, normal nailfold capillaries

## 2024-08-22 ENCOUNTER — APPOINTMENT (OUTPATIENT)
Age: 79
End: 2024-08-22

## 2024-08-22 ENCOUNTER — OUTPATIENT (OUTPATIENT)
Dept: OUTPATIENT SERVICES | Facility: HOSPITAL | Age: 79
LOS: 1 days | End: 2024-08-22
Payer: MEDICAID

## 2024-08-22 VITALS
SYSTOLIC BLOOD PRESSURE: 131 MMHG | BODY MASS INDEX: 40.75 KG/M2 | WEIGHT: 230 LBS | HEIGHT: 63 IN | DIASTOLIC BLOOD PRESSURE: 97 MMHG | HEART RATE: 77 BPM | TEMPERATURE: 98 F

## 2024-08-22 DIAGNOSIS — Z98.51 TUBAL LIGATION STATUS: Chronic | ICD-10-CM

## 2024-08-22 DIAGNOSIS — Z79.810 ENCOUNTER FOR THERAPEUTIC DRUG LVL MONITORING: ICD-10-CM

## 2024-08-22 DIAGNOSIS — Z98.890 OTHER SPECIFIED POSTPROCEDURAL STATES: Chronic | ICD-10-CM

## 2024-08-22 DIAGNOSIS — Z90.89 ACQUIRED ABSENCE OF OTHER ORGANS: Chronic | ICD-10-CM

## 2024-08-22 DIAGNOSIS — Z90.10 ACQUIRED ABSENCE OF UNSPECIFIED BREAST AND NIPPLE: Chronic | ICD-10-CM

## 2024-08-22 DIAGNOSIS — Z90.49 ACQUIRED ABSENCE OF OTHER SPECIFIED PARTS OF DIGESTIVE TRACT: Chronic | ICD-10-CM

## 2024-08-22 DIAGNOSIS — C50.411 MALIGNANT NEOPLASM OF UPPER-OUTER QUADRANT OF RIGHT FEMALE BREAST: ICD-10-CM

## 2024-08-22 DIAGNOSIS — Z51.81 ENCOUNTER FOR THERAPEUTIC DRUG LVL MONITORING: ICD-10-CM

## 2024-08-22 PROCEDURE — 99213 OFFICE O/P EST LOW 20 MIN: CPT

## 2024-08-23 DIAGNOSIS — C50.411 MALIGNANT NEOPLASM OF UPPER-OUTER QUADRANT OF RIGHT FEMALE BREAST: ICD-10-CM

## 2024-08-27 PROBLEM — Z51.81 ENCOUNTER FOR MONITORING TAMOXIFEN THERAPY: Status: ACTIVE | Noted: 2024-08-27

## 2024-08-27 NOTE — HISTORY OF PRESENT ILLNESS
[de-identified] : 74 yof, PMH of asthma, HTN, hypothyroidism, severe osteoarthritis (receives cortisone injections) presents for initial evaluation of R-sided breast cancer. \par  \par   Patient palpated a mass in her right breast and underwent ultrasound-guided core biopsy, which showed invasive ductal carcinoma well to moderately differentiated ER/MD positive %, HER2 negative, Ki-67 10-15%.  B/L breast MRI revealed 5 cm non-mass enhancement lateral left breast.  No biopsy was done secondary to patient preference to undergo B/L mastectomy and sentinel lymph node biopsy.  Pathology from B/L mastectomy and SLNB revealed left breast fibroadenoma, right breast invasive ductal carcinoma, moderately differentiated, 22 mm, a/w calcifications and DCIS, no LVI, negative margins. \par  \par   Pathology pT2p(sn)N0pMx (stage II A).  ER >90%, MD >90%, Ki-67 10-15%.  Oncotype dx score is 6.  Patient now presents with daughter regarding initiation of antihormonal therapy.\par  \par  Patient experienced menarche at age 11 and menopause at age 49.  She is  and has never .  She took OCPs for about one year in late 20s.  Never took hormone replacement therapy.\par  \par   [de-identified] : 2/6/20  Patient here for follow up, feeling well.  She denies any new complaints.  Patient denies any new palpable breast lumps or pain, denies skin changes, denies nipple discharge.  Patient denies cough, shortness of breath, denies fever, denies bone pain. She started taking Tamoxifen since Nov 2019, tolerating it well. Denies hot flashes, sweating. Pt is going for b/l axillary fat excision.  9/1/2021 Patient is here to follow up for breast cancer, accompanied by son Prashanth who is also interpreting for the patient since patient refused to use the  She is compliant with Tamoxifen since 11/2019, tolerating well She denies any new chest wall mass/pain, no vaginal bleeding, hot flushes or mood swings She is concerned that her hair falls in the morning She has not seen gyne and GI for colonoscopy She f/u with PCP Dr Glenroy Hutchins  2/17/2022 Patient is here to follow up for breast cancer, accompanied by her son Prashanth who is also interpreting for the patient since patient refused to use the  She is compliant with Tamoxifen since 11/2019, tolerating well. She denies any new chest wall mass/pain, no vaginal bleeding, hot flushes or mood swings. She has not seen gyne and GI for colonoscopy She had COVID infection on 12/2021. She had received 2 doses of COVID vaccine.  8/18/22 Patient is here to follow up for breast cancer, accompanied by her son Prashanth who is also interpreting for the patient since patient refused to use the . She is compliant with Tamoxifen since 11/2019, tolerating well. She denies any new chest wall mass/pain, no vaginal bleeding, hot flushes or mood swings. She states that she has been feeling bloated, has not been seeing gyne and not updated with colonoscopy.   6/8/23 Pt is here for follow up. C/O symptoms of bluish to whitish discoloration of her hands associated with pain which resolves spontaneously. Has been compliant with Tamoxifen Has not had GYN or GI follow up  8/22/24 Pt is here for follow up accompanied by her son. She is taking Tamoxifen since 11/2019 and tolerating it well. She has not followed up with GYN or GI yet. She has chronic knee pain- appt with Ortho on 9/4/24.

## 2024-08-27 NOTE — HISTORY OF PRESENT ILLNESS
[de-identified] : 74 yof, PMH of asthma, HTN, hypothyroidism, severe osteoarthritis (receives cortisone injections) presents for initial evaluation of R-sided breast cancer. \par  \par   Patient palpated a mass in her right breast and underwent ultrasound-guided core biopsy, which showed invasive ductal carcinoma well to moderately differentiated ER/UT positive %, HER2 negative, Ki-67 10-15%.  B/L breast MRI revealed 5 cm non-mass enhancement lateral left breast.  No biopsy was done secondary to patient preference to undergo B/L mastectomy and sentinel lymph node biopsy.  Pathology from B/L mastectomy and SLNB revealed left breast fibroadenoma, right breast invasive ductal carcinoma, moderately differentiated, 22 mm, a/w calcifications and DCIS, no LVI, negative margins. \par  \par   Pathology pT2p(sn)N0pMx (stage II A).  ER >90%, UT >90%, Ki-67 10-15%.  Oncotype dx score is 6.  Patient now presents with daughter regarding initiation of antihormonal therapy.\par  \par  Patient experienced menarche at age 11 and menopause at age 49.  She is  and has never .  She took OCPs for about one year in late 20s.  Never took hormone replacement therapy.\par  \par   [de-identified] : 2/6/20  Patient here for follow up, feeling well.  She denies any new complaints.  Patient denies any new palpable breast lumps or pain, denies skin changes, denies nipple discharge.  Patient denies cough, shortness of breath, denies fever, denies bone pain. She started taking Tamoxifen since Nov 2019, tolerating it well. Denies hot flashes, sweating. Pt is going for b/l axillary fat excision.  9/1/2021 Patient is here to follow up for breast cancer, accompanied by son Prashanth who is also interpreting for the patient since patient refused to use the  She is compliant with Tamoxifen since 11/2019, tolerating well She denies any new chest wall mass/pain, no vaginal bleeding, hot flushes or mood swings She is concerned that her hair falls in the morning She has not seen gyne and GI for colonoscopy She f/u with PCP Dr Glenroy Hutchins  2/17/2022 Patient is here to follow up for breast cancer, accompanied by her son Prashanth who is also interpreting for the patient since patient refused to use the  She is compliant with Tamoxifen since 11/2019, tolerating well. She denies any new chest wall mass/pain, no vaginal bleeding, hot flushes or mood swings. She has not seen gyne and GI for colonoscopy She had COVID infection on 12/2021. She had received 2 doses of COVID vaccine.  8/18/22 Patient is here to follow up for breast cancer, accompanied by her son Prashanth who is also interpreting for the patient since patient refused to use the . She is compliant with Tamoxifen since 11/2019, tolerating well. She denies any new chest wall mass/pain, no vaginal bleeding, hot flushes or mood swings. She states that she has been feeling bloated, has not been seeing gyne and not updated with colonoscopy.   6/8/23 Pt is here for follow up. C/O symptoms of bluish to whitish discoloration of her hands associated with pain which resolves spontaneously. Has been compliant with Tamoxifen Has not had GYN or GI follow up  8/22/24 Pt is here for follow up accompanied by her son. She is taking Tamoxifen since 11/2019 and tolerating it well. She has not followed up with GYN or GI yet. She has chronic knee pain- appt with Ortho on 9/4/24.

## 2024-08-27 NOTE — REVIEW OF SYSTEMS
[Negative] : Allergic/Immunologic [FreeTextEntry7] : + Bloating [de-identified] : right shin wound, approximated, no bleeding or abnormal discharge noted.

## 2024-08-27 NOTE — PHYSICAL EXAM
[Fully active, able to carry on all pre-disease performance without restriction] : Status 0 - Fully active, able to carry on all pre-disease performance without restriction [Obese] : obese [Normal] : affect appropriate [de-identified] : wound on right shin, approximated; no bleeding/abnormal discharge noted. [de-identified] : Uses a cane [de-identified] : s/p bilateral mastectomy; no lymphadenopathy noted bilaterally [de-identified] : obese, distended [de-identified] : Ambulates with cane

## 2024-08-27 NOTE — REVIEW OF SYSTEMS
[Negative] : Allergic/Immunologic [FreeTextEntry7] : + Bloating [de-identified] : right shin wound, approximated, no bleeding or abnormal discharge noted.

## 2024-08-27 NOTE — PHYSICAL EXAM
[Fully active, able to carry on all pre-disease performance without restriction] : Status 0 - Fully active, able to carry on all pre-disease performance without restriction [Obese] : obese [Normal] : affect appropriate [de-identified] : wound on right shin, approximated; no bleeding/abnormal discharge noted. [de-identified] : Uses a cane [de-identified] : s/p bilateral mastectomy; no lymphadenopathy noted bilaterally [de-identified] : obese, distended [de-identified] : Ambulates with cane

## 2024-08-27 NOTE — ASSESSMENT
[FreeTextEntry1] : Patient is a 77 year old female with  stage IIA, HR positive, HER2 negative breast cancer s/p bilateral mastectomy, on tamoxifen since 11/19  Oncotype DX RS 6  RECOMMENDATION: Previous notes reviewed and all relevant radiology results discussed and were communicated to the patient and his son, Prashanth.  #Stage IIA HR positive HER2 negative breast cancer KADY  -- Continue Tamoxifen 20 mg daily (Started 11/2019) Side effects of Tamoxifen including hot flashes, menstrual irregularities, weight gain, mood changes, DVT, cataracts and uterine cancer were discussed. Compliance assessed on today's visit. -- Advised to follow up with gyne -  -- Emphasized to follow up with GI for colonoscopy. -- Healthy lifestyle discussed. -- Continue to follow up with PCP as recommended. --F/U with Ortho 9/4/24 for possible knee surgery.  # ? Raynauds syndrome Rheum eval  RTC 6 months with Dr. Hartley.  All her concerns were addressed during the visit

## 2024-09-04 ENCOUNTER — APPOINTMENT (OUTPATIENT)
Dept: ORTHOPEDIC SURGERY | Facility: CLINIC | Age: 79
End: 2024-09-04
Payer: MEDICAID

## 2024-09-04 VITALS — HEIGHT: 63 IN | BODY MASS INDEX: 41.29 KG/M2 | WEIGHT: 233 LBS

## 2024-09-04 DIAGNOSIS — L97.211 VENOUS INSUFFICIENCY (CHRONIC) (PERIPHERAL): ICD-10-CM

## 2024-09-04 DIAGNOSIS — M17.12 UNILATERAL PRIMARY OSTEOARTHRITIS, LEFT KNEE: ICD-10-CM

## 2024-09-04 DIAGNOSIS — E66.01 MORBID (SEVERE) OBESITY DUE TO EXCESS CALORIES: ICD-10-CM

## 2024-09-04 DIAGNOSIS — I87.2 VENOUS INSUFFICIENCY (CHRONIC) (PERIPHERAL): ICD-10-CM

## 2024-09-04 DIAGNOSIS — S81.801A UNSPECIFIED OPEN WOUND, RIGHT LOWER LEG, INITIAL ENCOUNTER: ICD-10-CM

## 2024-09-04 DIAGNOSIS — L08.9 LOCAL INFECTION OF THE SKIN AND SUBCUTANEOUS TISSUE, UNSPECIFIED: ICD-10-CM

## 2024-09-04 DIAGNOSIS — M17.11 UNILATERAL PRIMARY OSTEOARTHRITIS, RIGHT KNEE: ICD-10-CM

## 2024-09-04 PROCEDURE — 99213 OFFICE O/P EST LOW 20 MIN: CPT

## 2024-09-04 NOTE — PHYSICAL EXAM
[de-identified] : Patient has significant pain with range of motion of bilateral knees.  Today her weight is 233 pounds. This brings her BMI to over 41. She has significant venous stasis changes in bilateral lower extremities.  There is active cellulitis of the right leg with anterior ulcer.

## 2024-09-04 NOTE — DISCUSSION/SUMMARY
[de-identified] : All these findings were discussed with Ms. Dutton and her son.  We discussed that additional weight loss is needed to bring her BMI under 40.  To undergo total knee arthroplasty, she also needs to improve the condition of her skin.  We discussed that cellulitis, and chronic venous stasis is a significant risk and a relative contraindication for surgery.  It may lead to infection of the knee arthroplasty.  She was again referred to vascular surgeon for optimization and will follow-up with me when her condition improves.  Last time she did not follow-up with the vascular surgeon.

## 2024-09-04 NOTE — PHYSICAL EXAM
[de-identified] : Patient has significant pain with range of motion of bilateral knees.  Today her weight is 233 pounds. This brings her BMI to over 41. She has significant venous stasis changes in bilateral lower extremities.  There is active cellulitis of the right leg with anterior ulcer.

## 2024-09-04 NOTE — HISTORY OF PRESENT ILLNESS
[de-identified] : 78-year-old lady presents a follow-up of pain in her knees.  She was seen last time on 1 July.  She reports that pain is unchanged.  She did not lose significant amount of weight.  The ulcer of her right shin reopened.  Patient's son stated that it is due to patient's "being nervous."

## 2024-09-04 NOTE — DISCUSSION/SUMMARY
[de-identified] : All these findings were discussed with Ms. Dutton and her son.  We discussed that additional weight loss is needed to bring her BMI under 40.  To undergo total knee arthroplasty, she also needs to improve the condition of her skin.  We discussed that cellulitis, and chronic venous stasis is a significant risk and a relative contraindication for surgery.  It may lead to infection of the knee arthroplasty.  She was again referred to vascular surgeon for optimization and will follow-up with me when her condition improves.  Last time she did not follow-up with the vascular surgeon.

## 2024-09-04 NOTE — HISTORY OF PRESENT ILLNESS
[de-identified] : 78-year-old lady presents a follow-up of pain in her knees.  She was seen last time on 1 July.  She reports that pain is unchanged.  She did not lose significant amount of weight.  The ulcer of her right shin reopened.  Patient's son stated that it is due to patient's "being nervous."

## 2024-09-16 ENCOUNTER — APPOINTMENT (OUTPATIENT)
Dept: GASTROENTEROLOGY | Facility: CLINIC | Age: 79
End: 2024-09-16

## 2024-09-26 ENCOUNTER — OUTPATIENT (OUTPATIENT)
Dept: OUTPATIENT SERVICES | Facility: HOSPITAL | Age: 79
LOS: 1 days | End: 2024-09-26
Payer: MEDICAID

## 2024-09-26 ENCOUNTER — APPOINTMENT (OUTPATIENT)
Dept: OBGYN | Facility: CLINIC | Age: 79
End: 2024-09-26
Payer: MEDICAID

## 2024-09-26 VITALS
BODY MASS INDEX: 40.57 KG/M2 | SYSTOLIC BLOOD PRESSURE: 148 MMHG | HEART RATE: 68 BPM | HEIGHT: 63 IN | WEIGHT: 229 LBS | DIASTOLIC BLOOD PRESSURE: 88 MMHG

## 2024-09-26 DIAGNOSIS — Z90.10 ACQUIRED ABSENCE OF UNSPECIFIED BREAST AND NIPPLE: Chronic | ICD-10-CM

## 2024-09-26 DIAGNOSIS — N39.41 URGE INCONTINENCE: ICD-10-CM

## 2024-09-26 DIAGNOSIS — Z98.890 OTHER SPECIFIED POSTPROCEDURAL STATES: Chronic | ICD-10-CM

## 2024-09-26 DIAGNOSIS — Z00.00 ENCOUNTER FOR GENERAL ADULT MEDICAL EXAMINATION WITHOUT ABNORMAL FINDINGS: ICD-10-CM

## 2024-09-26 DIAGNOSIS — Z98.51 TUBAL LIGATION STATUS: Chronic | ICD-10-CM

## 2024-09-26 DIAGNOSIS — Z90.89 ACQUIRED ABSENCE OF OTHER ORGANS: Chronic | ICD-10-CM

## 2024-09-26 DIAGNOSIS — Z90.49 ACQUIRED ABSENCE OF OTHER SPECIFIED PARTS OF DIGESTIVE TRACT: Chronic | ICD-10-CM

## 2024-09-26 PROCEDURE — 99214 OFFICE O/P EST MOD 30 MIN: CPT

## 2024-09-26 PROCEDURE — 99459 PELVIC EXAMINATION: CPT

## 2024-09-26 NOTE — HISTORY OF PRESENT ILLNESS
[FreeTextEntry1] : 78yo c/o urinary frequency never seen by urogynecologist no further postmenopausal bleeding

## 2024-10-02 ENCOUNTER — APPOINTMENT (OUTPATIENT)
Dept: ORTHOPEDIC SURGERY | Facility: CLINIC | Age: 79
End: 2024-10-02

## 2024-10-08 ENCOUNTER — APPOINTMENT (OUTPATIENT)
Dept: RHEUMATOLOGY | Facility: CLINIC | Age: 79
End: 2024-10-08

## 2024-10-28 ENCOUNTER — APPOINTMENT (OUTPATIENT)
Dept: ORTHOPEDIC SURGERY | Facility: CLINIC | Age: 79
End: 2024-10-28
Payer: MEDICAID

## 2024-10-28 VITALS — WEIGHT: 232 LBS | BODY MASS INDEX: 41.11 KG/M2 | HEIGHT: 63 IN

## 2024-10-28 DIAGNOSIS — E66.01 MORBID (SEVERE) OBESITY DUE TO EXCESS CALORIES: ICD-10-CM

## 2024-10-28 DIAGNOSIS — M17.12 UNILATERAL PRIMARY OSTEOARTHRITIS, LEFT KNEE: ICD-10-CM

## 2024-10-28 DIAGNOSIS — E66.09 OTHER OBESITY DUE TO EXCESS CALORIES: ICD-10-CM

## 2024-10-28 DIAGNOSIS — S81.801A UNSPECIFIED OPEN WOUND, RIGHT LOWER LEG, INITIAL ENCOUNTER: ICD-10-CM

## 2024-10-28 PROCEDURE — 99213 OFFICE O/P EST LOW 20 MIN: CPT

## 2024-10-31 ENCOUNTER — RX RENEWAL (OUTPATIENT)
Age: 79
End: 2024-10-31

## 2024-11-21 ENCOUNTER — APPOINTMENT (OUTPATIENT)
Age: 79
End: 2024-11-21

## 2024-12-20 ENCOUNTER — APPOINTMENT (OUTPATIENT)
Dept: ORTHOPEDIC SURGERY | Facility: CLINIC | Age: 79
End: 2024-12-20
Payer: SELF-PAY

## 2024-12-20 PROCEDURE — 20610 DRAIN/INJ JOINT/BURSA W/O US: CPT | Mod: 50

## 2024-12-23 ENCOUNTER — RX RENEWAL (OUTPATIENT)
Age: 79
End: 2024-12-23

## 2024-12-27 ENCOUNTER — APPOINTMENT (OUTPATIENT)
Dept: ORTHOPEDIC SURGERY | Facility: CLINIC | Age: 79
End: 2024-12-27
Payer: SELF-PAY

## 2024-12-27 PROCEDURE — 20610 DRAIN/INJ JOINT/BURSA W/O US: CPT | Mod: 50

## 2025-01-03 ENCOUNTER — APPOINTMENT (OUTPATIENT)
Dept: ORTHOPEDIC SURGERY | Facility: CLINIC | Age: 80
End: 2025-01-03
Payer: SELF-PAY

## 2025-01-03 DIAGNOSIS — M17.12 UNILATERAL PRIMARY OSTEOARTHRITIS, LEFT KNEE: ICD-10-CM

## 2025-01-03 DIAGNOSIS — M17.11 UNILATERAL PRIMARY OSTEOARTHRITIS, RIGHT KNEE: ICD-10-CM

## 2025-01-03 PROCEDURE — 20610 DRAIN/INJ JOINT/BURSA W/O US: CPT | Mod: 50

## 2025-01-30 ENCOUNTER — RX RENEWAL (OUTPATIENT)
Age: 80
End: 2025-01-30

## 2025-02-13 NOTE — ED PROVIDER NOTE - OBJECTIVE STATEMENT
73 year old female, pmhx HTN, COPD, arthritis, hypothyroidism, depression, presenting with a several week history of worsening left second toe infection. Patient is being followed outpatient by Dr. Roy from podiatry and has been on multiple outpatient abx without improvement so she was sent to the ED by podiatry for admission for IV abx and possible surgical debridement. Patient otherwise denies systemic symptoms, including fevers, headache, vision changes, weakness/numbness, confusion, URI symptoms, neck pain, chest pain, back pain, dyspnea, cough, palpitations, nausea, vomiting, abdominal pain, diarrhea, constipation, blood in stool/dark stools, urinary symptoms, vaginal bleeding/discharge, leg swelling, rash, recent travel or sick contacts.
weight loss

## 2025-02-20 NOTE — H&P PST ADULT - TEMPERATURE IN FAHRENHEIT (DEGREES F)
Quality 431: Preventive Care And Screening: Unhealthy Alcohol Use - Screening: Patient not identified as an unhealthy alcohol user when screened for unhealthy alcohol use using a systematic screening method Quality 130: Documentation Of Current Medications In The Medical Record: Current Medications Documented Detail Level: Detailed Quality 226: Preventive Care And Screening: Tobacco Use: Screening And Cessation Intervention: Patient screened for tobacco use and is an ex/non-smoker PAST MEDICAL HISTORY:  No pertinent past medical history 97.9

## 2025-02-27 ENCOUNTER — OUTPATIENT (OUTPATIENT)
Dept: OUTPATIENT SERVICES | Facility: HOSPITAL | Age: 80
LOS: 1 days | End: 2025-02-27
Payer: MEDICAID

## 2025-02-27 ENCOUNTER — APPOINTMENT (OUTPATIENT)
Age: 80
End: 2025-02-27

## 2025-02-27 VITALS
RESPIRATION RATE: 17 BRPM | HEART RATE: 59 BPM | HEIGHT: 63 IN | BODY MASS INDEX: 39.87 KG/M2 | TEMPERATURE: 98 F | OXYGEN SATURATION: 98 % | WEIGHT: 225 LBS | SYSTOLIC BLOOD PRESSURE: 136 MMHG | DIASTOLIC BLOOD PRESSURE: 83 MMHG

## 2025-02-27 DIAGNOSIS — Z98.51 TUBAL LIGATION STATUS: Chronic | ICD-10-CM

## 2025-02-27 DIAGNOSIS — Z90.10 ACQUIRED ABSENCE OF UNSPECIFIED BREAST AND NIPPLE: Chronic | ICD-10-CM

## 2025-02-27 DIAGNOSIS — Z51.81 ENCOUNTER FOR THERAPEUTIC DRUG LVL MONITORING: ICD-10-CM

## 2025-02-27 DIAGNOSIS — Z79.810 ENCOUNTER FOR THERAPEUTIC DRUG LVL MONITORING: ICD-10-CM

## 2025-02-27 DIAGNOSIS — C50.411 MALIGNANT NEOPLASM OF UPPER-OUTER QUADRANT OF RIGHT FEMALE BREAST: ICD-10-CM

## 2025-02-27 DIAGNOSIS — Z90.13 ACQUIRED ABSENCE OF BILATERAL BREASTS AND NIPPLES: ICD-10-CM

## 2025-02-27 DIAGNOSIS — Z98.890 OTHER SPECIFIED POSTPROCEDURAL STATES: Chronic | ICD-10-CM

## 2025-02-27 DIAGNOSIS — Z90.49 ACQUIRED ABSENCE OF OTHER SPECIFIED PARTS OF DIGESTIVE TRACT: Chronic | ICD-10-CM

## 2025-02-27 DIAGNOSIS — Z90.89 ACQUIRED ABSENCE OF OTHER ORGANS: Chronic | ICD-10-CM

## 2025-02-27 PROCEDURE — 99214 OFFICE O/P EST MOD 30 MIN: CPT

## 2025-02-27 RX ORDER — ANASTROZOLE TABLETS 1 MG/1
1 TABLET ORAL DAILY
Qty: 30 | Refills: 3 | Status: ACTIVE | COMMUNITY
Start: 2025-02-27 | End: 1900-01-01

## 2025-03-24 ENCOUNTER — RX RENEWAL (OUTPATIENT)
Age: 80
End: 2025-03-24

## 2025-04-30 NOTE — REASON FOR VISIT
Goal Outcome Evaluation:      Plan of Care Reviewed With: caregiver    Overall Patient Progress: no changeOverall Patient Progress: no change    Outcome Evaluation: Pt can return to group home when medically ready.       [Post Op: _________] : a [unfilled] post op visit [Family Member] : family member

## 2025-05-16 ENCOUNTER — RX RENEWAL (OUTPATIENT)
Age: 80
End: 2025-05-16

## 2025-06-05 NOTE — H&P PST ADULT - TEACHING/LEARNING RELIGIOUS CONSIDERATIONS
Discharge Instructions for Open Heart Surgery    What you will need at home:               * Accurate Scale               *  Digital Thermometer               *  Antibacterial Soap                *  Clean Wash Cloths             *  Someone to be with you for one week              DO NOT LIFT, PUSH, OR PULL ANYTHING OVER 5 POUNDS FOR 8 WEEK from the day of surgery. This could prevent your sternum from healing properly.                    ?When you are given permission from your surgeon to begin lifting again,                     do so gradually. You will need time to build your muscle strength.                  ? A gallon of milk is more than 5 lbs. you should buy ½ gallons.    NEVER SMOKE AGAIN!  Absolutely no tobacco products!  Do not allow others to smoke around you.  Second hand smoke can be just as bad.  The [employer] has a free program available, call 1-388.464.6260.      Activity: See your activity diary in your binder for your walking plan.  Plan to walk indoors on days the temperature is below 40? or over 80? or during smog alerts.  At first limit your stair climbing to once or twice a day.  You may use the handrail for balance only.  Do not pull yourself up with it.  It is not unusual to feel tired for the first few weeks, but walking builds up your strength.    Driving: Do not drive a car or any vehicle for 4 weeks from the day of surgery.  You can not drive a truck, tractor or  for 8 weeks from the day of surgery.  After 4 weeks, you can drive vehicles with power steering only.  Do not drive while on pain medication.    Incentive Spirometer:  Continue to use your lung exerciser for the next 4 weeks.  Support your chest and cough each time you complete the 10 exercises.    Weight:  Weigh yourself every morning.  Call the surgeon if you gain 3 pounds or more overnight or 5 pounds in a week.  This may be a sign of fluid retention.    Medication:    Pain pills are ordered to keep you  with the incision.  Keep your legs elevated     above the level of your heart when lying or sitting.  ? Wear loose clothing.  For support women should wear a bra.    RENATO Hose (white stockings):  Should be worn during the day and off at night.  Someone other than the patient will need to put on and take off the hose.  It is too much pulling and tugging for the patient.  Expect them to be difficult to put on and they should feel snug.  These are usually worn for 2-4 weeks.  Wash them by hand to keep their elasticity.    Diet:  Eat a low fat, low salt diet.  Eat a high fiber diet to avoid constipation and straining during bowel movements (whole grains, raw veggies, fruits)    Diabetics:  Check blood sugars twice a day.  Contact your primary care physician if your blood sugar is consistently above 130.  Good tissue healing occurs when blood sugars are less than 130.      Housework: Do not cut the grass, vacuum, sweep or do any heavy housework.    Riding: You may ride in the car for local trips, up to 45 minutes. If it you have to travel further stop every 45 min.  Wear your lap and shoulder belts in the car.  Place your heart pillow between your chest and the shoulder belt.     Sexual Activity: When you can climb 2 flights of stairs without chest pain, shortness of breath or fatigue, it is generally OK to resume sexual activity.      Depression:  It is not unusual to have feelings of anxiety, fear or depression after surgery.  If you need help with these feelings, call your primary care physician.  There are medications to help and healing usually occurs sooner if you’re not depressed.    Heart Valve Replacement:  If you had your heart valve replaced you should receive a card for your wallet.  Show ALL your doctors and dentist the card before treatment.  You will need to take antibiotics before and after surgery, teeth cleaning etc. for the rest of your life.    ? If you get a sore throat or fever over 101? that lasts  none

## 2025-06-08 NOTE — ED PROVIDER NOTE - SECONDARY DIAGNOSIS.
History: Rekha Fever Urinary retention

## 2025-07-17 ENCOUNTER — APPOINTMENT (OUTPATIENT)
Age: 80
End: 2025-07-17

## 2025-07-31 ENCOUNTER — APPOINTMENT (OUTPATIENT)
Dept: BREAST CENTER | Facility: CLINIC | Age: 80
End: 2025-07-31

## 2025-09-02 ENCOUNTER — RX RENEWAL (OUTPATIENT)
Age: 80
End: 2025-09-02